# Patient Record
Sex: MALE | Race: WHITE | NOT HISPANIC OR LATINO | Employment: OTHER | URBAN - METROPOLITAN AREA
[De-identification: names, ages, dates, MRNs, and addresses within clinical notes are randomized per-mention and may not be internally consistent; named-entity substitution may affect disease eponyms.]

---

## 2019-05-03 ENCOUNTER — APPOINTMENT (OUTPATIENT)
Dept: GENERAL RADIOLOGY | Facility: HOSPITAL | Age: 74
End: 2019-05-03

## 2019-05-03 ENCOUNTER — HOSPITAL ENCOUNTER (EMERGENCY)
Facility: HOSPITAL | Age: 74
Discharge: HOME | End: 2019-05-03
Attending: EMERGENCY MEDICINE | Admitting: EMERGENCY MEDICINE

## 2019-05-03 VITALS
HEART RATE: 85 BPM | OXYGEN SATURATION: 98 % | DIASTOLIC BLOOD PRESSURE: 80 MMHG | RESPIRATION RATE: 20 BRPM | BODY MASS INDEX: 43.44 KG/M2 | SYSTOLIC BLOOD PRESSURE: 158 MMHG | TEMPERATURE: 98.5 F | HEIGHT: 74 IN

## 2019-05-03 DIAGNOSIS — B97.89 VIRAL RESPIRATORY ILLNESS: Primary | ICD-10-CM

## 2019-05-03 DIAGNOSIS — J98.8 VIRAL RESPIRATORY ILLNESS: Primary | ICD-10-CM

## 2019-05-03 LAB
ALBUMIN SERPL-MCNC: 3.4 G/DL (ref 3.5–5.2)
ALBUMIN/GLOB SERPL: 0.8 G/DL
ALP SERPL-CCNC: 129 U/L (ref 39–117)
ALT SERPL W P-5'-P-CCNC: 29 U/L (ref 1–41)
ANION GAP SERPL CALCULATED.3IONS-SCNC: 10.3 MMOL/L
AST SERPL-CCNC: 35 U/L (ref 1–40)
B PARAPERT DNA SPEC QL NAA+PROBE: NOT DETECTED
B PERT DNA SPEC QL NAA+PROBE: NOT DETECTED
BASOPHILS # BLD AUTO: 0.02 10*3/MM3 (ref 0–0.2)
BASOPHILS NFR BLD AUTO: 0.3 % (ref 0–1.5)
BILIRUB SERPL-MCNC: 0.5 MG/DL (ref 0.2–1.2)
BUN BLD-MCNC: 16 MG/DL (ref 8–23)
BUN/CREAT SERPL: 19 (ref 7–25)
C PNEUM DNA NPH QL NAA+NON-PROBE: NOT DETECTED
CALCIUM SPEC-SCNC: 8.7 MG/DL (ref 8.6–10.5)
CHLORIDE SERPL-SCNC: 97 MMOL/L (ref 98–107)
CO2 SERPL-SCNC: 27.7 MMOL/L (ref 22–29)
CREAT BLD-MCNC: 0.84 MG/DL (ref 0.76–1.27)
D-LACTATE SERPL-SCNC: 1.6 MMOL/L (ref 0.5–2)
DEPRECATED RDW RBC AUTO: 46 FL (ref 37–54)
EOSINOPHIL # BLD AUTO: 0.11 10*3/MM3 (ref 0–0.4)
EOSINOPHIL NFR BLD AUTO: 1.9 % (ref 0.3–6.2)
ERYTHROCYTE [DISTWIDTH] IN BLOOD BY AUTOMATED COUNT: 14.1 % (ref 12.3–15.4)
FLUAV H1 2009 PAND RNA NPH QL NAA+PROBE: NOT DETECTED
FLUAV H1 HA GENE NPH QL NAA+PROBE: NOT DETECTED
FLUAV H3 RNA NPH QL NAA+PROBE: NOT DETECTED
FLUAV SUBTYP SPEC NAA+PROBE: NOT DETECTED
FLUBV RNA ISLT QL NAA+PROBE: NOT DETECTED
GFR SERPL CREATININE-BSD FRML MDRD: 90 ML/MIN/1.73
GLOBULIN UR ELPH-MCNC: 4.3 GM/DL
GLUCOSE BLD-MCNC: 268 MG/DL (ref 65–99)
HADV DNA SPEC NAA+PROBE: NOT DETECTED
HCOV 229E RNA SPEC QL NAA+PROBE: NOT DETECTED
HCOV HKU1 RNA SPEC QL NAA+PROBE: NOT DETECTED
HCOV NL63 RNA SPEC QL NAA+PROBE: NOT DETECTED
HCOV OC43 RNA SPEC QL NAA+PROBE: NOT DETECTED
HCT VFR BLD AUTO: 46.3 % (ref 37.5–51)
HGB BLD-MCNC: 14.9 G/DL (ref 13–17.7)
HMPV RNA NPH QL NAA+NON-PROBE: NOT DETECTED
HPIV1 RNA SPEC QL NAA+PROBE: NOT DETECTED
HPIV2 RNA SPEC QL NAA+PROBE: NOT DETECTED
HPIV3 RNA NPH QL NAA+PROBE: DETECTED
HPIV4 P GENE NPH QL NAA+PROBE: NOT DETECTED
IMM GRANULOCYTES # BLD AUTO: 0.02 10*3/MM3 (ref 0–0.05)
IMM GRANULOCYTES NFR BLD AUTO: 0.3 % (ref 0–0.5)
LYMPHOCYTES # BLD AUTO: 1.21 10*3/MM3 (ref 0.7–3.1)
LYMPHOCYTES NFR BLD AUTO: 21.1 % (ref 19.6–45.3)
M PNEUMO IGG SER IA-ACNC: NOT DETECTED
MCH RBC QN AUTO: 28.7 PG (ref 26.6–33)
MCHC RBC AUTO-ENTMCNC: 32.2 G/DL (ref 31.5–35.7)
MCV RBC AUTO: 89 FL (ref 79–97)
MONOCYTES # BLD AUTO: 0.51 10*3/MM3 (ref 0.1–0.9)
MONOCYTES NFR BLD AUTO: 8.9 % (ref 5–12)
NEUTROPHILS # BLD AUTO: 3.87 10*3/MM3 (ref 1.7–7)
NEUTROPHILS NFR BLD AUTO: 67.5 % (ref 42.7–76)
NRBC BLD AUTO-RTO: 0 /100 WBC (ref 0–0.2)
NT-PROBNP SERPL-MCNC: 43.7 PG/ML (ref 5–900)
PLATELET # BLD AUTO: 131 10*3/MM3 (ref 140–450)
PMV BLD AUTO: 10.8 FL (ref 6–12)
POTASSIUM BLD-SCNC: 4.6 MMOL/L (ref 3.5–5.2)
PROT SERPL-MCNC: 7.7 G/DL (ref 6–8.5)
RBC # BLD AUTO: 5.2 10*6/MM3 (ref 4.14–5.8)
RHINOVIRUS RNA SPEC NAA+PROBE: DETECTED
RSV RNA NPH QL NAA+NON-PROBE: NOT DETECTED
SODIUM BLD-SCNC: 135 MMOL/L (ref 136–145)
TROPONIN T SERPL-MCNC: <0.01 NG/ML (ref 0–0.03)
WBC NRBC COR # BLD: 5.74 10*3/MM3 (ref 3.4–10.8)

## 2019-05-03 PROCEDURE — 99284 EMERGENCY DEPT VISIT MOD MDM: CPT

## 2019-05-03 PROCEDURE — 93005 ELECTROCARDIOGRAM TRACING: CPT | Performed by: NURSE PRACTITIONER

## 2019-05-03 PROCEDURE — 87798 DETECT AGENT NOS DNA AMP: CPT | Performed by: NURSE PRACTITIONER

## 2019-05-03 PROCEDURE — 87581 M.PNEUMON DNA AMP PROBE: CPT | Performed by: NURSE PRACTITIONER

## 2019-05-03 PROCEDURE — 85025 COMPLETE CBC W/AUTO DIFF WBC: CPT | Performed by: NURSE PRACTITIONER

## 2019-05-03 PROCEDURE — 80053 COMPREHEN METABOLIC PANEL: CPT | Performed by: NURSE PRACTITIONER

## 2019-05-03 PROCEDURE — 25010000002 METHYLPREDNISOLONE PER 125 MG: Performed by: NURSE PRACTITIONER

## 2019-05-03 PROCEDURE — 84484 ASSAY OF TROPONIN QUANT: CPT | Performed by: NURSE PRACTITIONER

## 2019-05-03 PROCEDURE — 87486 CHLMYD PNEUM DNA AMP PROBE: CPT | Performed by: NURSE PRACTITIONER

## 2019-05-03 PROCEDURE — 87633 RESP VIRUS 12-25 TARGETS: CPT | Performed by: NURSE PRACTITIONER

## 2019-05-03 PROCEDURE — 93010 ELECTROCARDIOGRAM REPORT: CPT | Performed by: INTERNAL MEDICINE

## 2019-05-03 PROCEDURE — 71046 X-RAY EXAM CHEST 2 VIEWS: CPT

## 2019-05-03 PROCEDURE — 94640 AIRWAY INHALATION TREATMENT: CPT

## 2019-05-03 PROCEDURE — 83605 ASSAY OF LACTIC ACID: CPT | Performed by: NURSE PRACTITIONER

## 2019-05-03 PROCEDURE — 94799 UNLISTED PULMONARY SVC/PX: CPT

## 2019-05-03 PROCEDURE — 87040 BLOOD CULTURE FOR BACTERIA: CPT | Performed by: NURSE PRACTITIONER

## 2019-05-03 PROCEDURE — 83880 ASSAY OF NATRIURETIC PEPTIDE: CPT | Performed by: NURSE PRACTITIONER

## 2019-05-03 PROCEDURE — 96374 THER/PROPH/DIAG INJ IV PUSH: CPT

## 2019-05-03 RX ORDER — METHYLPREDNISOLONE 4 MG/1
TABLET ORAL
Qty: 21 EACH | Refills: 0 | Status: SHIPPED | OUTPATIENT
Start: 2019-05-03

## 2019-05-03 RX ORDER — METHYLPREDNISOLONE SODIUM SUCCINATE 125 MG/2ML
125 INJECTION, POWDER, LYOPHILIZED, FOR SOLUTION INTRAMUSCULAR; INTRAVENOUS ONCE
Status: COMPLETED | OUTPATIENT
Start: 2019-05-03 | End: 2019-05-03

## 2019-05-03 RX ORDER — ALBUTEROL SULFATE 2.5 MG/3ML
2.5 SOLUTION RESPIRATORY (INHALATION)
Status: COMPLETED | OUTPATIENT
Start: 2019-05-03 | End: 2019-05-03

## 2019-05-03 RX ORDER — SODIUM CHLORIDE 0.9 % (FLUSH) 0.9 %
10 SYRINGE (ML) INJECTION AS NEEDED
Status: DISCONTINUED | OUTPATIENT
Start: 2019-05-03 | End: 2019-05-03 | Stop reason: HOSPADM

## 2019-05-03 RX ORDER — ALBUTEROL SULFATE 90 UG/1
2 AEROSOL, METERED RESPIRATORY (INHALATION) EVERY 4 HOURS PRN
Qty: 1 INHALER | Refills: 0 | Status: SHIPPED | OUTPATIENT
Start: 2019-05-03

## 2019-05-03 RX ADMIN — ALBUTEROL SULFATE 2.5 MG: 2.5 SOLUTION RESPIRATORY (INHALATION) at 14:55

## 2019-05-03 RX ADMIN — ALBUTEROL SULFATE 2.5 MG: 2.5 SOLUTION RESPIRATORY (INHALATION) at 14:23

## 2019-05-03 RX ADMIN — ALBUTEROL SULFATE 2.5 MG: 2.5 SOLUTION RESPIRATORY (INHALATION) at 14:18

## 2019-05-03 RX ADMIN — METHYLPREDNISOLONE SODIUM SUCCINATE 125 MG: 125 INJECTION, POWDER, FOR SOLUTION INTRAMUSCULAR; INTRAVENOUS at 14:06

## 2019-05-03 NOTE — DISCHARGE INSTRUCTIONS
Medications as ordered  Tylenol as needed for fever  Rest, increase fluids  Activity as tolerated  Follow up with pmd in 3-5 days if symptoms not improving  Return to er for fever, chills, chest pain, shortness of air, or any new or worsening symptoms
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
## 2019-05-08 LAB
BACTERIA SPEC AEROBE CULT: NORMAL
BACTERIA SPEC AEROBE CULT: NORMAL

## 2021-04-08 NOTE — ED PROVIDER NOTES
Jeffy, Discussed the importance of blood pressure control in the prevention of ocular complications. MD ATTESTATION NOTE    Pt presents with SOA starting one week ago. Pt confirms dizziness, congestion, cough, light-headedness, and CP secondary to cough.     Limited physical exam:  Patient is nontoxic appearing, awake, alert, morbidly obese.  Lungs/cardiovascular: faint scattered rhonchi, no rales.  Abdomen: soft, non-tender.    Pt notes improvement after breathing treatments. I agree with plan of discharge.     The [facility] and I have discussed this patient's history, physical exam, and treatment plan.  I have reviewed the documentation and personally had a face to face interaction with the patient. I affirm the documentation and agree with the treatment and plan.  The attached note describes my personal findings.    Documentation assistance provided by geovanna Tabares for Dr. Stoll.  Information recorded by the scribe was done at my direction and has been verified and validated by me.       Megan Tabares  05/03/19 [id number]       Felix Stoll MD  05/03/19 8612

## 2023-06-09 ENCOUNTER — APPOINTMENT (OUTPATIENT)
Dept: GENERAL RADIOLOGY | Facility: HOSPITAL | Age: 78
End: 2023-06-09
Payer: MEDICARE

## 2023-06-09 ENCOUNTER — HOSPITAL ENCOUNTER (INPATIENT)
Facility: HOSPITAL | Age: 78
LOS: 4 days | Discharge: HOME | End: 2023-06-14
Attending: EMERGENCY MEDICINE | Admitting: INTERNAL MEDICINE
Payer: MEDICARE

## 2023-06-09 DIAGNOSIS — N17.9 AKI (ACUTE KIDNEY INJURY): ICD-10-CM

## 2023-06-09 DIAGNOSIS — R77.8 ELEVATED TROPONIN: ICD-10-CM

## 2023-06-09 DIAGNOSIS — D64.9 ANEMIA, UNSPECIFIED TYPE: ICD-10-CM

## 2023-06-09 DIAGNOSIS — I50.9 ACUTE ON CHRONIC CONGESTIVE HEART FAILURE, UNSPECIFIED HEART FAILURE TYPE: Primary | ICD-10-CM

## 2023-06-09 DIAGNOSIS — R09.02 HYPOXIA: ICD-10-CM

## 2023-06-09 DIAGNOSIS — J44.1 ACUTE EXACERBATION OF CHRONIC OBSTRUCTIVE PULMONARY DISEASE (COPD): ICD-10-CM

## 2023-06-09 LAB
ALBUMIN SERPL-MCNC: 2.9 G/DL (ref 3.5–5.2)
ALBUMIN/GLOB SERPL: 0.7 G/DL
ALP SERPL-CCNC: 74 U/L (ref 39–117)
ALT SERPL W P-5'-P-CCNC: 22 U/L (ref 1–41)
ANION GAP SERPL CALCULATED.3IONS-SCNC: 7.8 MMOL/L (ref 5–15)
AST SERPL-CCNC: 33 U/L (ref 1–40)
B PARAPERT DNA SPEC QL NAA+PROBE: NOT DETECTED
B PERT DNA SPEC QL NAA+PROBE: NOT DETECTED
BASOPHILS # BLD AUTO: 0.01 10*3/MM3 (ref 0–0.2)
BASOPHILS NFR BLD AUTO: 0.3 % (ref 0–1.5)
BILIRUB SERPL-MCNC: 0.5 MG/DL (ref 0–1.2)
BUN SERPL-MCNC: 33 MG/DL (ref 8–23)
BUN/CREAT SERPL: 23.7 (ref 7–25)
C PNEUM DNA NPH QL NAA+NON-PROBE: NOT DETECTED
CALCIUM SPEC-SCNC: 8.4 MG/DL (ref 8.6–10.5)
CHLORIDE SERPL-SCNC: 104 MMOL/L (ref 98–107)
CO2 SERPL-SCNC: 26.2 MMOL/L (ref 22–29)
CREAT SERPL-MCNC: 1.39 MG/DL (ref 0.76–1.27)
D DIMER PPP FEU-MCNC: 1.94 MCGFEU/ML (ref 0–0.77)
DEPRECATED RDW RBC AUTO: 45 FL (ref 37–54)
EGFRCR SERPLBLD CKD-EPI 2021: 52.2 ML/MIN/1.73
EOSINOPHIL # BLD AUTO: 0.09 10*3/MM3 (ref 0–0.4)
EOSINOPHIL NFR BLD AUTO: 2.6 % (ref 0.3–6.2)
ERYTHROCYTE [DISTWIDTH] IN BLOOD BY AUTOMATED COUNT: 13.6 % (ref 12.3–15.4)
FLUAV SUBTYP SPEC NAA+PROBE: NOT DETECTED
FLUBV RNA ISLT QL NAA+PROBE: NOT DETECTED
GLOBULIN UR ELPH-MCNC: 4.2 GM/DL
GLUCOSE BLDC GLUCOMTR-MCNC: 68 MG/DL (ref 70–130)
GLUCOSE SERPL-MCNC: 96 MG/DL (ref 65–99)
HADV DNA SPEC NAA+PROBE: NOT DETECTED
HCOV 229E RNA SPEC QL NAA+PROBE: NOT DETECTED
HCOV HKU1 RNA SPEC QL NAA+PROBE: NOT DETECTED
HCOV NL63 RNA SPEC QL NAA+PROBE: NOT DETECTED
HCOV OC43 RNA SPEC QL NAA+PROBE: NOT DETECTED
HCT VFR BLD AUTO: 36.1 % (ref 37.5–51)
HGB BLD-MCNC: 12 G/DL (ref 13–17.7)
HMPV RNA NPH QL NAA+NON-PROBE: NOT DETECTED
HOLD SPECIMEN: NORMAL
HOLD SPECIMEN: NORMAL
HPIV1 RNA ISLT QL NAA+PROBE: NOT DETECTED
HPIV2 RNA SPEC QL NAA+PROBE: NOT DETECTED
HPIV3 RNA NPH QL NAA+PROBE: NOT DETECTED
HPIV4 P GENE NPH QL NAA+PROBE: NOT DETECTED
IMM GRANULOCYTES # BLD AUTO: 0 10*3/MM3 (ref 0–0.05)
IMM GRANULOCYTES NFR BLD AUTO: 0 % (ref 0–0.5)
LYMPHOCYTES # BLD AUTO: 0.74 10*3/MM3 (ref 0.7–3.1)
LYMPHOCYTES NFR BLD AUTO: 21.4 % (ref 19.6–45.3)
M PNEUMO IGG SER IA-ACNC: NOT DETECTED
MCH RBC QN AUTO: 30 PG (ref 26.6–33)
MCHC RBC AUTO-ENTMCNC: 33.2 G/DL (ref 31.5–35.7)
MCV RBC AUTO: 90.3 FL (ref 79–97)
MONOCYTES # BLD AUTO: 0.32 10*3/MM3 (ref 0.1–0.9)
MONOCYTES NFR BLD AUTO: 9.2 % (ref 5–12)
NEUTROPHILS NFR BLD AUTO: 2.3 10*3/MM3 (ref 1.7–7)
NEUTROPHILS NFR BLD AUTO: 66.5 % (ref 42.7–76)
NRBC BLD AUTO-RTO: 0 /100 WBC (ref 0–0.2)
NT-PROBNP SERPL-MCNC: 643 PG/ML (ref 0–1800)
PLATELET # BLD AUTO: 85 10*3/MM3 (ref 140–450)
PMV BLD AUTO: 11.3 FL (ref 6–12)
POTASSIUM SERPL-SCNC: 4.9 MMOL/L (ref 3.5–5.2)
PROT SERPL-MCNC: 7.1 G/DL (ref 6–8.5)
QT INTERVAL: 482 MS
RBC # BLD AUTO: 4 10*6/MM3 (ref 4.14–5.8)
RHINOVIRUS RNA SPEC NAA+PROBE: NOT DETECTED
RSV RNA NPH QL NAA+NON-PROBE: NOT DETECTED
SARS-COV-2 RNA NPH QL NAA+NON-PROBE: NOT DETECTED
SODIUM SERPL-SCNC: 138 MMOL/L (ref 136–145)
TROPONIN T SERPL HS-MCNC: 21 NG/L
TROPONIN T SERPL HS-MCNC: 21 NG/L
WBC NRBC COR # BLD: 3.46 10*3/MM3 (ref 3.4–10.8)
WHOLE BLOOD HOLD COAG: NORMAL
WHOLE BLOOD HOLD SPECIMEN: NORMAL

## 2023-06-09 PROCEDURE — 63710000001 PREDNISONE PER 1 MG: Performed by: EMERGENCY MEDICINE

## 2023-06-09 PROCEDURE — 94640 AIRWAY INHALATION TREATMENT: CPT

## 2023-06-09 PROCEDURE — 93010 ELECTROCARDIOGRAM REPORT: CPT | Performed by: STUDENT IN AN ORGANIZED HEALTH CARE EDUCATION/TRAINING PROGRAM

## 2023-06-09 PROCEDURE — 71046 X-RAY EXAM CHEST 2 VIEWS: CPT

## 2023-06-09 PROCEDURE — 84484 ASSAY OF TROPONIN QUANT: CPT | Performed by: EMERGENCY MEDICINE

## 2023-06-09 PROCEDURE — G0378 HOSPITAL OBSERVATION PER HR: HCPCS

## 2023-06-09 PROCEDURE — 93005 ELECTROCARDIOGRAM TRACING: CPT | Performed by: EMERGENCY MEDICINE

## 2023-06-09 PROCEDURE — 25010000002 MAGNESIUM SULFATE 2 GM/50ML SOLUTION: Performed by: EMERGENCY MEDICINE

## 2023-06-09 PROCEDURE — 80053 COMPREHEN METABOLIC PANEL: CPT | Performed by: EMERGENCY MEDICINE

## 2023-06-09 PROCEDURE — 25010000002 FUROSEMIDE PER 20 MG: Performed by: EMERGENCY MEDICINE

## 2023-06-09 PROCEDURE — 94799 UNLISTED PULMONARY SVC/PX: CPT

## 2023-06-09 PROCEDURE — 82948 REAGENT STRIP/BLOOD GLUCOSE: CPT

## 2023-06-09 PROCEDURE — 94761 N-INVAS EAR/PLS OXIMETRY MLT: CPT

## 2023-06-09 PROCEDURE — 85379 FIBRIN DEGRADATION QUANT: CPT | Performed by: EMERGENCY MEDICINE

## 2023-06-09 PROCEDURE — 84484 ASSAY OF TROPONIN QUANT: CPT | Performed by: INTERNAL MEDICINE

## 2023-06-09 PROCEDURE — 94664 DEMO&/EVAL PT USE INHALER: CPT

## 2023-06-09 PROCEDURE — 25010000002 FUROSEMIDE PER 20 MG: Performed by: INTERNAL MEDICINE

## 2023-06-09 PROCEDURE — 25010000002 METHYLPREDNISOLONE PER 40 MG: Performed by: INTERNAL MEDICINE

## 2023-06-09 PROCEDURE — 0202U NFCT DS 22 TRGT SARS-COV-2: CPT | Performed by: EMERGENCY MEDICINE

## 2023-06-09 PROCEDURE — 83880 ASSAY OF NATRIURETIC PEPTIDE: CPT | Performed by: EMERGENCY MEDICINE

## 2023-06-09 PROCEDURE — 85025 COMPLETE CBC W/AUTO DIFF WBC: CPT | Performed by: EMERGENCY MEDICINE

## 2023-06-09 PROCEDURE — 99285 EMERGENCY DEPT VISIT HI MDM: CPT

## 2023-06-09 RX ORDER — LEVOTHYROXINE SODIUM 0.03 MG/1
25 TABLET ORAL
Status: DISCONTINUED | OUTPATIENT
Start: 2023-06-10 | End: 2023-06-14 | Stop reason: HOSPADM

## 2023-06-09 RX ORDER — ACETAMINOPHEN 325 MG/1
650 TABLET ORAL EVERY 4 HOURS PRN
Status: DISCONTINUED | OUTPATIENT
Start: 2023-06-09 | End: 2023-06-14 | Stop reason: HOSPADM

## 2023-06-09 RX ORDER — FUROSEMIDE 10 MG/ML
80 INJECTION INTRAMUSCULAR; INTRAVENOUS ONCE
Status: COMPLETED | OUTPATIENT
Start: 2023-06-09 | End: 2023-06-09

## 2023-06-09 RX ORDER — ALBUTEROL SULFATE 2.5 MG/3ML
2.5 SOLUTION RESPIRATORY (INHALATION) ONCE
Status: COMPLETED | OUTPATIENT
Start: 2023-06-09 | End: 2023-06-09

## 2023-06-09 RX ORDER — AMOXICILLIN 250 MG
2 CAPSULE ORAL 2 TIMES DAILY
Status: DISCONTINUED | OUTPATIENT
Start: 2023-06-09 | End: 2023-06-14 | Stop reason: HOSPADM

## 2023-06-09 RX ORDER — GABAPENTIN 300 MG/1
300 CAPSULE ORAL 3 TIMES DAILY
Status: DISCONTINUED | OUTPATIENT
Start: 2023-06-09 | End: 2023-06-11

## 2023-06-09 RX ORDER — GABAPENTIN 300 MG/1
300 CAPSULE ORAL 3 TIMES DAILY
COMMUNITY
Start: 2023-03-13 | End: 2023-06-14 | Stop reason: HOSPADM

## 2023-06-09 RX ORDER — AMLODIPINE BESYLATE 10 MG/1
10 TABLET ORAL
Status: DISCONTINUED | OUTPATIENT
Start: 2023-06-10 | End: 2023-06-14 | Stop reason: HOSPADM

## 2023-06-09 RX ORDER — ATORVASTATIN CALCIUM 20 MG/1
40 TABLET, FILM COATED ORAL NIGHTLY
Status: DISCONTINUED | OUTPATIENT
Start: 2023-06-09 | End: 2023-06-14 | Stop reason: HOSPADM

## 2023-06-09 RX ORDER — HYDROCODONE BITARTRATE AND ACETAMINOPHEN 10; 325 MG/1; MG/1
1 TABLET ORAL EVERY 6 HOURS PRN
Status: DISCONTINUED | OUTPATIENT
Start: 2023-06-09 | End: 2023-06-14 | Stop reason: HOSPADM

## 2023-06-09 RX ORDER — LEVOTHYROXINE SODIUM 0.03 MG/1
1 TABLET ORAL DAILY
COMMUNITY
Start: 2023-04-11

## 2023-06-09 RX ORDER — POLYETHYLENE GLYCOL 3350 17 G/17G
17 POWDER, FOR SOLUTION ORAL DAILY PRN
Status: DISCONTINUED | OUTPATIENT
Start: 2023-06-09 | End: 2023-06-14 | Stop reason: HOSPADM

## 2023-06-09 RX ORDER — IPRATROPIUM BROMIDE AND ALBUTEROL SULFATE 2.5; .5 MG/3ML; MG/3ML
3 SOLUTION RESPIRATORY (INHALATION) ONCE
Status: COMPLETED | OUTPATIENT
Start: 2023-06-09 | End: 2023-06-09

## 2023-06-09 RX ORDER — BISACODYL 5 MG/1
5 TABLET, DELAYED RELEASE ORAL DAILY PRN
Status: DISCONTINUED | OUTPATIENT
Start: 2023-06-09 | End: 2023-06-14 | Stop reason: HOSPADM

## 2023-06-09 RX ORDER — PREDNISONE 20 MG/1
60 TABLET ORAL ONCE
Status: COMPLETED | OUTPATIENT
Start: 2023-06-09 | End: 2023-06-09

## 2023-06-09 RX ORDER — ONDANSETRON 2 MG/ML
4 INJECTION INTRAMUSCULAR; INTRAVENOUS EVERY 6 HOURS PRN
Status: DISCONTINUED | OUTPATIENT
Start: 2023-06-09 | End: 2023-06-14 | Stop reason: HOSPADM

## 2023-06-09 RX ORDER — ALBUTEROL SULFATE 2.5 MG/3ML
2.5 SOLUTION RESPIRATORY (INHALATION) EVERY 6 HOURS PRN
Status: DISCONTINUED | OUTPATIENT
Start: 2023-06-09 | End: 2023-06-14 | Stop reason: HOSPADM

## 2023-06-09 RX ORDER — MAGNESIUM SULFATE HEPTAHYDRATE 40 MG/ML
2 INJECTION, SOLUTION INTRAVENOUS ONCE
Status: COMPLETED | OUTPATIENT
Start: 2023-06-09 | End: 2023-06-09

## 2023-06-09 RX ORDER — FUROSEMIDE 10 MG/ML
40 INJECTION INTRAMUSCULAR; INTRAVENOUS EVERY 12 HOURS
Status: DISCONTINUED | OUTPATIENT
Start: 2023-06-09 | End: 2023-06-11

## 2023-06-09 RX ORDER — BISACODYL 10 MG
10 SUPPOSITORY, RECTAL RECTAL DAILY PRN
Status: DISCONTINUED | OUTPATIENT
Start: 2023-06-09 | End: 2023-06-14 | Stop reason: HOSPADM

## 2023-06-09 RX ORDER — METHYLPREDNISOLONE SODIUM SUCCINATE 40 MG/ML
40 INJECTION, POWDER, LYOPHILIZED, FOR SOLUTION INTRAMUSCULAR; INTRAVENOUS EVERY 8 HOURS
Status: DISCONTINUED | OUTPATIENT
Start: 2023-06-09 | End: 2023-06-13

## 2023-06-09 RX ORDER — ONDANSETRON 4 MG/1
4 TABLET, FILM COATED ORAL EVERY 6 HOURS PRN
Status: DISCONTINUED | OUTPATIENT
Start: 2023-06-09 | End: 2023-06-14 | Stop reason: HOSPADM

## 2023-06-09 RX ORDER — UREA 10 %
3 LOTION (ML) TOPICAL NIGHTLY PRN
Status: DISCONTINUED | OUTPATIENT
Start: 2023-06-09 | End: 2023-06-14 | Stop reason: HOSPADM

## 2023-06-09 RX ORDER — SODIUM CHLORIDE 0.9 % (FLUSH) 0.9 %
10 SYRINGE (ML) INJECTION AS NEEDED
Status: DISCONTINUED | OUTPATIENT
Start: 2023-06-09 | End: 2023-06-14 | Stop reason: HOSPADM

## 2023-06-09 RX ORDER — BUPROPION HYDROCHLORIDE 150 MG/1
150 TABLET, EXTENDED RELEASE ORAL DAILY
Status: DISCONTINUED | OUTPATIENT
Start: 2023-06-10 | End: 2023-06-14 | Stop reason: HOSPADM

## 2023-06-09 RX ORDER — IPRATROPIUM BROMIDE AND ALBUTEROL SULFATE 2.5; .5 MG/3ML; MG/3ML
3 SOLUTION RESPIRATORY (INHALATION)
Status: DISCONTINUED | OUTPATIENT
Start: 2023-06-09 | End: 2023-06-14 | Stop reason: HOSPADM

## 2023-06-09 RX ORDER — HYDROCODONE BITARTRATE AND ACETAMINOPHEN 10; 325 MG/1; MG/1
1 TABLET ORAL EVERY 6 HOURS PRN
COMMUNITY
Start: 2023-05-18

## 2023-06-09 RX ADMIN — METHYLPREDNISOLONE SODIUM SUCCINATE 40 MG: 40 INJECTION, POWDER, FOR SOLUTION INTRAMUSCULAR; INTRAVENOUS at 21:33

## 2023-06-09 RX ADMIN — ALBUTEROL SULFATE 2.5 MG: 2.5 SOLUTION RESPIRATORY (INHALATION) at 15:40

## 2023-06-09 RX ADMIN — PREDNISONE 60 MG: 20 TABLET ORAL at 15:22

## 2023-06-09 RX ADMIN — IPRATROPIUM BROMIDE AND ALBUTEROL SULFATE 3 ML: .5; 3 SOLUTION RESPIRATORY (INHALATION) at 15:42

## 2023-06-09 RX ADMIN — FUROSEMIDE 40 MG: 10 INJECTION, SOLUTION INTRAMUSCULAR; INTRAVENOUS at 21:33

## 2023-06-09 RX ADMIN — MAGNESIUM SULFATE HEPTAHYDRATE 2 G: 40 INJECTION, SOLUTION INTRAVENOUS at 15:22

## 2023-06-09 RX ADMIN — FUROSEMIDE 80 MG: 10 INJECTION, SOLUTION INTRAMUSCULAR; INTRAVENOUS at 15:23

## 2023-06-09 NOTE — ED NOTES
..Nursing report ED to floor  Brandon Alan  78 y.o.  male    HPI :   Chief Complaint   Patient presents with    Shortness of Breath       Admitting doctor:   Anna Nolasco MD    Admitting diagnosis:   The primary encounter diagnosis was Acute on chronic congestive heart failure, unspecified heart failure type. Diagnoses of Hypoxia, ELBERT (acute kidney injury), Acute exacerbation of chronic obstructive pulmonary disease (COPD), Elevated troponin, and Anemia, unspecified type were also pertinent to this visit.    Code status:   Current Code Status       Date Active Code Status Order ID Comments User Context       Prior            Allergies:   Penicillins    Isolation:   No active isolations    Intake and Output  No intake or output data in the 24 hours ending 06/09/23 1628    Weight:   There were no vitals filed for this visit.    Most recent vitals:   Vitals:    06/09/23 1331 06/09/23 1334 06/09/23 1348 06/09/23 1540   BP:   100/62    Pulse: 58      Resp: 16   22   Temp: 97 °F (36.1 °C)      SpO2:  92%         Active LDAs/IV Access:   Lines, Drains & Airways       Active LDAs       Name Placement date Placement time Site Days    Peripheral IV 06/09/23 1432 Right Antecubital 06/09/23  1432  Antecubital  less than 1                    Labs (abnormal labs have a star):   Labs Reviewed   COMPREHENSIVE METABOLIC PANEL - Abnormal; Notable for the following components:       Result Value    BUN 33 (*)     Creatinine 1.39 (*)     Calcium 8.4 (*)     Albumin 2.9 (*)     eGFR 52.2 (*)     All other components within normal limits    Narrative:     GFR Normal >60  Chronic Kidney Disease <60  Kidney Failure <15    The GFR formula is only valid for adults with stable renal function between ages 18 and 70.   SINGLE HSTROPONIN T - Abnormal; Notable for the following components:    HS Troponin T 21 (*)     All other components within normal limits    Narrative:     High Sensitive Troponin T Reference Range:  <10.0 ng/L-  "Negative Female for AMI  <15.0 ng/L- Negative Male for AMI  >=10 - Abnormal Female indicating possible myocardial injury.  >=15 - Abnormal Male indicating possible myocardial injury.   Clinicians would have to utilize clinical acumen, EKG, Troponin, and serial changes to determine if it is an Acute Myocardial Infarction or myocardial injury due to an underlying chronic condition.        CBC WITH AUTO DIFFERENTIAL - Abnormal; Notable for the following components:    RBC 4.00 (*)     Hemoglobin 12.0 (*)     Hematocrit 36.1 (*)     Platelets 85 (*)     All other components within normal limits   D-DIMER, QUANTITATIVE - Abnormal; Notable for the following components:    D-Dimer, Quantitative 1.94 (*)     All other components within normal limits    Narrative:     According to the assay 's published package insert, a normal (<0.50 MCGFEU/mL) D-dimer result in conjunction with a non-high clinical probability assessment, excludes deep vein thrombosis (DVT) and pulmonary embolism (PE) with high sensitivity.    D-dimer values increase with age and this can make VTE exclusion of an older population difficult. To address this, the [employer], based on best available evidence and recent guidelines, recommends that clinicians use age-adjusted D-dimer thresholds in patients greater than 50 years of age with: a) a low probability of PE who do not meet all Pulmonary Embolism Rule Out Criteria, or b) in those with intermediate probability of PE.   The formula for an age-adjusted D-dimer cut-off is \"age/100\".  For example, a 60 year old patient would have an age-adjusted cut-off of 0.60 MCGFEU/mL and an 80 year old 0.80 MCGFEU/mL.   BNP (IN-HOUSE) - Normal    Narrative:     Among patients with dyspnea, NT-proBNP is highly sensitive for the detection of acute congestive heart failure. In addition NT-proBNP of <300 pg/ml effectively rules out acute congestive heart failure with 99% negative predictive " value.    Results may be falsely decreased if patient taking Biotin.     RESPIRATORY PANEL PCR W/ COVID-19 (SARS-COV-2) SON/MARY JO/ANGELA/PAD/COR/MAD/JOAQUIN IN-HOUSE, NP SWAB IN [facility], 3-4 HR TAT   RAINBOW DRAW    Narrative:     The following orders were created for panel order [facility] Draw.  Procedure                               Abnormality         Status                     ---------                               -----------         ------                     Green Top (Gel)[[id number]]                                  Final result               Lavender Top[[id number]]                                     Final result               Gold Top - SST[[id number]]                                   Final result               Light Blue Top[[id number]]                                   Final result                 Please view results for these tests on the individual orders.   CBC AND DIFFERENTIAL    Narrative:     The following orders were created for panel order CBC & Differential.  Procedure                               Abnormality         Status                     ---------                               -----------         ------                     CBC Auto Differential[[id number]]        Abnormal            Final result                 Please view results for these tests on the individual orders.   GREEN TOP   LAVENDER TOP   GOLD TOP - SST   LIGHT BLUE TOP       EKG:   ECG 12 Lead ED Triage Standing Order; SOA   Final Result   HEART RATE= 54  bpm   RR Interval= 1111  ms   DC Interval=   ms   P Horizontal Axis=   deg   P Front Axis=   deg   QRSD Interval= 129  ms   QT Interval= 482  ms   QRS Axis= -38  deg   T Wave Axis= 34  deg   - ABNORMAL ECG -   Likely sinus bradycardia   Left axis deviation   Nonspecific intraventricular conduction delay   When compared with ECG of 03-May-2019 14:16:55,   Significant artifact, please repeat   Electronically Signed By: Serafin Swartz ([facility]) 09-Jun-2023 14:55:27   Date and Time of Study:  2023-06-09 14:28:35          Meds given in ED:   Medications   sodium chloride 0.9 % flush 10 mL (has no administration in time range)   ipratropium-albuterol (DUO-NEB) nebulizer solution 3 mL (3 mL Nebulization Given 6/9/23 1542)   albuterol (PROVENTIL) nebulizer solution 0.083% 2.5 mg/3mL (2.5 mg Nebulization Given 6/9/23 1540)   magnesium sulfate 2g/50 mL (PREMIX) infusion (2 g Intravenous New Bag 6/9/23 1522)   predniSONE (DELTASONE) tablet 60 mg (60 mg Oral Given 6/9/23 1522)   furosemide (LASIX) injection 80 mg (80 mg Intravenous Given 6/9/23 1523)       Imaging results:  XR Chest 2 View    Result Date: 6/9/2023  No focal pulmonary consolidation. Cardiomegaly with pulmonary vascular congestion with minimal pleural effusions.  This report was finalized on 6/9/2023 4:05 PM by Dr. Sudeep Boothe M.D.       Ambulatory status:   - x1 assist    Social issues:   Social History     Socioeconomic History    Marital status: Single   Tobacco Use    Smoking status: Never   Substance and Sexual Activity    Alcohol use: No    Drug use: No             Jen Griffin RN  06/09/23 16:28 EDT

## 2023-06-09 NOTE — ED PROVIDER NOTES
EMERGENCY DEPARTMENT ENCOUNTER  I wore full protective equipment throughout this patient encounter including a N95 mask, eye shield, gown and gloves. Hand hygiene was performed before donning protective equipment and after removal when leaving the room.    Room Number:  39/39  Date of encounter:  6/9/2023  PCP: Wong Cuellar MD  Patient Care Team:  Wong Cuellar MD as PCP - General (Family Medicine)     HPI:  Context: Brandon Alan is a 78 y.o. male who presents to the ED c/o chief complaint of shortness of breath.  Patient reports history of COPD, on 2 L oxygen, reports he is on diuretic, Lasix 40 mg twice daily.  Patient reports that he is chronically short of breath, has been feeling more short of breath since he last saw his pulmonologist in April.  Patient denies dyspnea at rest, reports dyspnea with exertion which is typical for him but lately he has been having orthopnea, paroxysmal nocturnal dyspnea.  Patient does report some swelling in his lower extremities, has been compliant with his diuretic.  Patient does not take his weight daily, unaware of any unexplained weight gain.  Patient denies any chest pain, does endorse cough, cough is nonproductive in nature.  No vomiting or diarrhea, no loss of sense of smell or taste, no fever shakes chills or night sweats.  Patient has been vaccinated against COVID-19.    MEDICAL HISTORY REVIEW  Reviewed in [facility]    PAST MEDICAL HISTORY  Active Ambulatory Problems     Diagnosis Date Noted    Weakness 03/27/2016    Bronchitis 03/29/2016    DM type 2 (diabetes mellitus, type 2) 03/29/2016    Hypertension 03/29/2016    Hyponatremia 03/29/2016    Obesity 03/29/2016     Resolved Ambulatory Problems     Diagnosis Date Noted    No Resolved Ambulatory Problems     Past Medical History:   Diagnosis Date    Depression     Diabetes mellitus        PAST SURGICAL HISTORY  No past surgical history on file.    FAMILY HISTORY  No family history on file.    SOCIAL  HISTORY  Social History     Socioeconomic History    Marital status: Single   Tobacco Use    Smoking status: Never   Substance and Sexual Activity    Alcohol use: No    Drug use: No       ALLERGIES  Penicillins    The patient's allergies have been reviewed    REVIEW OF SYSTEMS  All systems reviewed and negative except for those discussed in HPI.     PHYSICAL EXAM  I have reviewed the triage vital signs and nursing notes.  ED Triage Vitals   Temp Heart Rate Resp BP SpO2   06/09/23 1331 06/09/23 1331 06/09/23 1331 06/09/23 1348 06/09/23 1334   97 °F (36.1 °C) 58 16 100/62 92 %      Temp src Heart Rate Source Patient Position BP Location FiO2 (%)   -- -- -- -- --              General: No acute distress.  HENT: NCAT, PERRL, Nares patent.  Eyes: no scleral icterus.  Neck: trachea midline, no ROM limitations.  To assess for JVD secondary to habitus  CV: regular rhythm, regular rate.  Respiratory: normal effort, diminished with expiratory wheezing.  Abdomen: soft, nondistended, NTTP, no rebound tenderness, no guarding or rigidity.  Musculoskeletal: no deformity.  Neuro: alert, moves all extremities, follows commands.  Skin: warm, dry.  1+ pitting edema bilateral lower extremities    LAB RESULTS  Recent Results (from the past 24 hour(s))   D-dimer, Quantitative    Collection Time: 06/09/23  2:04 PM    Specimen: Blood   Result Value Ref Range    D-Dimer, Quantitative 1.94 (H) 0.00 - 0.77 MCGFEU/mL   ECG 12 Lead ED Triage Standing Order; SOA    Collection Time: 06/09/23  2:28 PM   Result Value Ref Range    QT Interval 482 ms   Comprehensive Metabolic Panel    Collection Time: 06/09/23  2:31 PM    Specimen: Blood   Result Value Ref Range    Glucose 96 65 - 99 mg/dL    BUN 33 (H) 8 - 23 mg/dL    Creatinine 1.39 (H) 0.76 - 1.27 mg/dL    Sodium 138 136 - 145 mmol/L    Potassium 4.9 3.5 - 5.2 mmol/L    Chloride 104 98 - 107 mmol/L    CO2 26.2 22.0 - 29.0 mmol/L    Calcium 8.4 (L) 8.6 - 10.5 mg/dL    Total Protein 7.1 6.0 - 8.5  g/dL    Albumin 2.9 (L) 3.5 - 5.2 g/dL    ALT (SGPT) 22 1 - 41 U/L    AST (SGOT) 33 1 - 40 U/L    Alkaline Phosphatase 74 39 - 117 U/L    Total Bilirubin 0.5 0.0 - 1.2 mg/dL    Globulin 4.2 gm/dL    A/G Ratio 0.7 g/dL    BUN/Creatinine Ratio 23.7 7.0 - 25.0    Anion Gap 7.8 5.0 - 15.0 mmol/L    eGFR 52.2 (L) >60.0 mL/min/1.73   BNP    Collection Time: 06/09/23  2:31 PM    Specimen: Blood   Result Value Ref Range    proBNP 643.0 0.0 - 1,800.0 pg/mL   Single High Sensitivity Troponin T    Collection Time: 06/09/23  2:31 PM    Specimen: Blood   Result Value Ref Range    HS Troponin T 21 (H) <15 ng/L   Green Top (Gel)    Collection Time: 06/09/23  2:31 PM   Result Value Ref Range    Extra Tube Hold for add-ons.    Lavender Top    Collection Time: 06/09/23  2:31 PM   Result Value Ref Range    Extra Tube hold for add-on    Gold Top - SST    Collection Time: 06/09/23  2:31 PM   Result Value Ref Range    Extra Tube Hold for add-ons.    Light Blue Top    Collection Time: 06/09/23  2:31 PM   Result Value Ref Range    Extra Tube Hold for add-ons.    CBC Auto Differential    Collection Time: 06/09/23  2:31 PM    Specimen: Blood   Result Value Ref Range    WBC 3.46 3.40 - 10.80 10*3/mm3    RBC 4.00 (L) 4.14 - 5.80 10*6/mm3    Hemoglobin 12.0 (L) 13.0 - 17.7 g/dL    Hematocrit 36.1 (L) 37.5 - 51.0 %    MCV 90.3 79.0 - 97.0 fL    MCH 30.0 26.6 - 33.0 pg    MCHC 33.2 31.5 - 35.7 g/dL    RDW 13.6 12.3 - 15.4 %    RDW-SD 45.0 37.0 - 54.0 fl    MPV 11.3 6.0 - 12.0 fL    Platelets 85 (L) 140 - 450 10*3/mm3    Neutrophil % 66.5 42.7 - 76.0 %    Lymphocyte % 21.4 19.6 - 45.3 %    Monocyte % 9.2 5.0 - 12.0 %    Eosinophil % 2.6 0.3 - 6.2 %    Basophil % 0.3 0.0 - 1.5 %    Immature Grans % 0.0 0.0 - 0.5 %    Neutrophils, Absolute 2.30 1.70 - 7.00 10*3/mm3    Lymphocytes, Absolute 0.74 0.70 - 3.10 10*3/mm3    Monocytes, Absolute 0.32 0.10 - 0.90 10*3/mm3    Eosinophils, Absolute 0.09 0.00 - 0.40 10*3/mm3    Basophils, Absolute 0.01 0.00 -  0.20 10*3/mm3    Immature Grans, Absolute 0.00 0.00 - 0.05 10*3/mm3    nRBC 0.0 0.0 - 0.2 /100 WBC       I ordered the above labs and reviewed the results.    RADIOLOGY  XR Chest 2 View    Result Date: 6/9/2023  XR CHEST 2 VW-  HISTORY: Male who is 78 years-old,  short of breath  TECHNIQUE: Frontal and lateral views of the chest  COMPARISON: 05/02/2019  FINDINGS:  The heart is enlarged. Pulmonary vasculature is congested. No focal pulmonary consolidation. Minimal pleural effusions are suggested. No pneumothorax. Otherwise stable.      No focal pulmonary consolidation. Cardiomegaly with pulmonary vascular congestion with minimal pleural effusions.  This report was finalized on 6/9/2023 4:05 PM by Dr. Sudeep Boothe M.D.       I ordered the above noted radiological studies. I reviewed the images and results. I agree with the radiologist interpretation.    PROCEDURES  Procedures    MEDICATIONS GIVEN IN ER  Medications   sodium chloride 0.9 % flush 10 mL (has no administration in time range)   ipratropium-albuterol (DUO-NEB) nebulizer solution 3 mL (3 mL Nebulization Given 6/9/23 1542)   albuterol (PROVENTIL) nebulizer solution 0.083% 2.5 mg/3mL (2.5 mg Nebulization Given 6/9/23 1540)   magnesium sulfate 2g/50 mL (PREMIX) infusion (2 g Intravenous New Bag 6/9/23 1522)   predniSONE (DELTASONE) tablet 60 mg (60 mg Oral Given 6/9/23 1522)   furosemide (LASIX) injection 80 mg (80 mg Intravenous Given 6/9/23 1523)       PROGRESS, DATA ANALYSIS, CONSULTS, AND MEDICAL DECISION MAKING  A complete history and physical exam have been performed.  All available laboratory and imaging results have been reviewed by myself prior to disposition.    MDM    After the initial H&P, I discussed pertinent information from history and physical exam with patient/family.  Discussed differential diagnosis.  Discussed plan for ED evaluation/workup/treatment.  All questions answered.  Patient/family is agreeable with plan.  ED Course as of  06/09/23 1623   Fri Jun 09, 2023   1400 My differential diagnosis for dyspnea includes but is not limited to:  Asthma, COPD, pneumonia, pulmonary embolus, acute respiratory distress syndrome, pneumothorax, pleural effusion, pulmonary fibrosis, congestive heart failure, myocardial infarction, DKA, uremia, acidosis, sepsis, anemia, drug related, hyperventilation, CNS disease     [JG]   1402 Medical history reviewed and significant for: Patient followed by [facility] pulmonology, last seen in their office for COPD on 14 April of this year.  Per notes, patient mainly compliant with oxygen although at that time did walk into the clinic without wearing oxygen, was satting 89%.  Patient last had CT imaging of his chest performed and May of this year, patient had small bilateral pleural effusions with associated compressive atelectasis, cardiomegaly with dilation of pulmonary vasculature, mediastinal lymphadenopathy, right pulmonary nodule, hepatic cirrhosis. [JG]   1440 EKG independently viewed and contemporaneously interpreted by ED physician. Time: 1428.  Rate 54.  Interpretation: Normal sinus rhythm, borderline left axis deviation, nonspecific intraventricular conduction delay, no acute ST changes. [JG]   1441 When compared with prior EKG on 5/3/2019, left axis deviation is unchanged but nonspecific intraventricular conduction delay is new when compared with prior. [JG]   1500 Patient currently requiring 3 L to maintain oxygen saturation of 92%.  Patient's baseline oxygen is 2 L. [JG]   1602 I viewed chest x-ray in PACS, patient has cardiomegaly with pulmonary edema per my read. [JG]   1606 Patient with CHF exacerbation, hypoxic, mild ELBERT.  Patient also diminished, history of COPD, treating for COPD exacerbation as well.  Patient will require admission for diuresis and breathing treatments.  Consulting hospitalist for admission. [JG]   1611 78-year-old male with history of COPD on 2 L oxygen all time, CHF presents with  shortness of breath.  Patient found to have acute CHF exacerbation, hypoxic, treated with Lasix.  Patient also diminished, wheezing, treating with steroids, breathing treatments, magnesium.  Patient noted to have mild ELBERT.  Troponin minimally elevated 21, EKG showed no acute findings.  Patient currently pending hospitalist callback for admission. [JG]   1622 Phone call with Dr. Nolasco [facility].  Discussed the patient, relevant history, exam, diagnostics, ED findings/progress, and concerns. They agree to admit the patient to telemetry observation. Care assumed by the admitting physician at this time. [JG]      ED Course User Index  [JG] Medardo Puente MD       AS OF 16:23 EDT VITALS:    BP - 100/62  HR - 58  TEMP - 97 °F (36.1 °C)  O2 SATS - 92%    DIAGNOSIS  Final diagnoses:   Acute on chronic congestive heart failure, unspecified heart failure type   Hypoxia   ELBERT (acute kidney injury)   Acute exacerbation of chronic obstructive pulmonary disease (COPD)   Elevated troponin   Anemia, unspecified type         DISPOSITION  ADMISSION    Discussed treatment plan and reason for admission with pt/family and admitting physician.  Pt/family voiced understanding of the plan for admission for further testing/treatment as needed.        Medardo Puente MD  06/09/23 [id number]

## 2023-06-09 NOTE — ED TRIAGE NOTES
Patient reports being more short of breath, he states that he has felt bad for around 2 months- they have put him on oxygen and gave him inhalers but they are not working.

## 2023-06-10 PROBLEM — N18.30 CKD (CHRONIC KIDNEY DISEASE) STAGE 3, GFR 30-59 ML/MIN: Status: ACTIVE | Noted: 2023-06-10

## 2023-06-10 PROBLEM — E87.5 HYPERKALEMIA: Status: ACTIVE | Noted: 2023-06-10

## 2023-06-10 PROBLEM — G47.33 OSA AND COPD OVERLAP SYNDROME: Status: ACTIVE | Noted: 2023-06-10

## 2023-06-10 PROBLEM — J44.9 OSA AND COPD OVERLAP SYNDROME: Status: ACTIVE | Noted: 2023-06-10

## 2023-06-10 LAB
ANION GAP SERPL CALCULATED.3IONS-SCNC: 8.9 MMOL/L (ref 5–15)
BUN SERPL-MCNC: 45 MG/DL (ref 8–23)
BUN/CREAT SERPL: 23.9 (ref 7–25)
CALCIUM SPEC-SCNC: 8 MG/DL (ref 8.6–10.5)
CHLORIDE SERPL-SCNC: 98 MMOL/L (ref 98–107)
CO2 SERPL-SCNC: 24.1 MMOL/L (ref 22–29)
CREAT SERPL-MCNC: 1.88 MG/DL (ref 0.76–1.27)
DEPRECATED RDW RBC AUTO: 45.3 FL (ref 37–54)
EGFRCR SERPLBLD CKD-EPI 2021: 36.3 ML/MIN/1.73
ERYTHROCYTE [DISTWIDTH] IN BLOOD BY AUTOMATED COUNT: 13.5 % (ref 12.3–15.4)
GEN 5 2HR TROPONIN T REFLEX: 20 NG/L
GLUCOSE BLDC GLUCOMTR-MCNC: 114 MG/DL (ref 70–130)
GLUCOSE BLDC GLUCOMTR-MCNC: 146 MG/DL (ref 70–130)
GLUCOSE BLDC GLUCOMTR-MCNC: 226 MG/DL (ref 70–130)
GLUCOSE BLDC GLUCOMTR-MCNC: 240 MG/DL (ref 70–130)
GLUCOSE SERPL-MCNC: 241 MG/DL (ref 65–99)
HCT VFR BLD AUTO: 37 % (ref 37.5–51)
HGB BLD-MCNC: 12 G/DL (ref 13–17.7)
MCH RBC QN AUTO: 29.9 PG (ref 26.6–33)
MCHC RBC AUTO-ENTMCNC: 32.4 G/DL (ref 31.5–35.7)
MCV RBC AUTO: 92 FL (ref 79–97)
PLATELET # BLD AUTO: 98 10*3/MM3 (ref 140–450)
PMV BLD AUTO: 11.4 FL (ref 6–12)
POTASSIUM SERPL-SCNC: 6.2 MMOL/L (ref 3.5–5.2)
POTASSIUM SERPL-SCNC: 6.4 MMOL/L (ref 3.5–5.2)
QT INTERVAL: 453 MS
RBC # BLD AUTO: 4.02 10*6/MM3 (ref 4.14–5.8)
SODIUM SERPL-SCNC: 131 MMOL/L (ref 136–145)
TROPONIN T DELTA: -1 NG/L
TSH SERPL DL<=0.05 MIU/L-ACNC: 1.4 UIU/ML (ref 0.27–4.2)
WBC NRBC COR # BLD: 2.89 10*3/MM3 (ref 3.4–10.8)

## 2023-06-10 PROCEDURE — 25010000002 METHYLPREDNISOLONE PER 40 MG: Performed by: INTERNAL MEDICINE

## 2023-06-10 PROCEDURE — 63710000001 INSULIN LISPRO (HUMAN) PER 5 UNITS: Performed by: NURSE PRACTITIONER

## 2023-06-10 PROCEDURE — 85027 COMPLETE CBC AUTOMATED: CPT | Performed by: INTERNAL MEDICINE

## 2023-06-10 PROCEDURE — 94799 UNLISTED PULMONARY SVC/PX: CPT

## 2023-06-10 PROCEDURE — 94761 N-INVAS EAR/PLS OXIMETRY MLT: CPT

## 2023-06-10 PROCEDURE — 82948 REAGENT STRIP/BLOOD GLUCOSE: CPT

## 2023-06-10 PROCEDURE — 84484 ASSAY OF TROPONIN QUANT: CPT | Performed by: INTERNAL MEDICINE

## 2023-06-10 PROCEDURE — 93010 ELECTROCARDIOGRAM REPORT: CPT | Performed by: INTERNAL MEDICINE

## 2023-06-10 PROCEDURE — 25010000002 FUROSEMIDE PER 20 MG: Performed by: INTERNAL MEDICINE

## 2023-06-10 PROCEDURE — 84443 ASSAY THYROID STIM HORMONE: CPT | Performed by: NURSE PRACTITIONER

## 2023-06-10 PROCEDURE — 94664 DEMO&/EVAL PT USE INHALER: CPT

## 2023-06-10 PROCEDURE — 80048 BASIC METABOLIC PNL TOTAL CA: CPT | Performed by: INTERNAL MEDICINE

## 2023-06-10 PROCEDURE — 93005 ELECTROCARDIOGRAM TRACING: CPT | Performed by: NURSE PRACTITIONER

## 2023-06-10 PROCEDURE — 84132 ASSAY OF SERUM POTASSIUM: CPT | Performed by: NURSE PRACTITIONER

## 2023-06-10 RX ORDER — IBUPROFEN 600 MG/1
1 TABLET ORAL
Status: DISCONTINUED | OUTPATIENT
Start: 2023-06-10 | End: 2023-06-14 | Stop reason: HOSPADM

## 2023-06-10 RX ORDER — DEXTROSE MONOHYDRATE 25 G/50ML
25 INJECTION, SOLUTION INTRAVENOUS
Status: DISCONTINUED | OUTPATIENT
Start: 2023-06-10 | End: 2023-06-14 | Stop reason: HOSPADM

## 2023-06-10 RX ORDER — NICOTINE POLACRILEX 4 MG
15 LOZENGE BUCCAL
Status: DISCONTINUED | OUTPATIENT
Start: 2023-06-10 | End: 2023-06-14 | Stop reason: HOSPADM

## 2023-06-10 RX ORDER — INSULIN LISPRO 100 [IU]/ML
2-7 INJECTION, SOLUTION INTRAVENOUS; SUBCUTANEOUS
Status: DISCONTINUED | OUTPATIENT
Start: 2023-06-10 | End: 2023-06-13

## 2023-06-10 RX ORDER — BUDESONIDE AND FORMOTEROL FUMARATE DIHYDRATE 160; 4.5 UG/1; UG/1
2 AEROSOL RESPIRATORY (INHALATION)
Status: DISCONTINUED | OUTPATIENT
Start: 2023-06-10 | End: 2023-06-14 | Stop reason: HOSPADM

## 2023-06-10 RX ORDER — NITROGLYCERIN 0.4 MG/1
0.4 TABLET SUBLINGUAL
Status: DISCONTINUED | OUTPATIENT
Start: 2023-06-10 | End: 2023-06-14 | Stop reason: HOSPADM

## 2023-06-10 RX ADMIN — HYDROCODONE BITARTRATE AND ACETAMINOPHEN 1 TABLET: 10; 325 TABLET ORAL at 00:53

## 2023-06-10 RX ADMIN — FUROSEMIDE 40 MG: 10 INJECTION, SOLUTION INTRAMUSCULAR; INTRAVENOUS at 10:00

## 2023-06-10 RX ADMIN — ACETAMINOPHEN 650 MG: 325 TABLET ORAL at 20:39

## 2023-06-10 RX ADMIN — GABAPENTIN 300 MG: 300 CAPSULE ORAL at 18:02

## 2023-06-10 RX ADMIN — IPRATROPIUM BROMIDE AND ALBUTEROL SULFATE 3 ML: .5; 3 SOLUTION RESPIRATORY (INHALATION) at 20:58

## 2023-06-10 RX ADMIN — HYDROCODONE BITARTRATE AND ACETAMINOPHEN 1 TABLET: 10; 325 TABLET ORAL at 20:18

## 2023-06-10 RX ADMIN — INSULIN LISPRO 3 UNITS: 100 INJECTION, SOLUTION INTRAVENOUS; SUBCUTANEOUS at 11:53

## 2023-06-10 RX ADMIN — IPRATROPIUM BROMIDE AND ALBUTEROL SULFATE 3 ML: .5; 3 SOLUTION RESPIRATORY (INHALATION) at 07:17

## 2023-06-10 RX ADMIN — FUROSEMIDE 40 MG: 10 INJECTION, SOLUTION INTRAMUSCULAR; INTRAVENOUS at 22:10

## 2023-06-10 RX ADMIN — METHYLPREDNISOLONE SODIUM SUCCINATE 40 MG: 40 INJECTION, POWDER, FOR SOLUTION INTRAMUSCULAR; INTRAVENOUS at 13:00

## 2023-06-10 RX ADMIN — SODIUM ZIRCONIUM CYCLOSILICATE 10 G: 10 POWDER, FOR SUSPENSION ORAL at 13:00

## 2023-06-10 RX ADMIN — GABAPENTIN 300 MG: 300 CAPSULE ORAL at 20:15

## 2023-06-10 RX ADMIN — DOCUSATE SODIUM 50MG AND SENNOSIDES 8.6MG 2 TABLET: 8.6; 5 TABLET, FILM COATED ORAL at 10:00

## 2023-06-10 RX ADMIN — LEVOTHYROXINE SODIUM 25 MCG: 0.03 TABLET ORAL at 06:08

## 2023-06-10 RX ADMIN — IPRATROPIUM BROMIDE AND ALBUTEROL SULFATE 3 ML: .5; 3 SOLUTION RESPIRATORY (INHALATION) at 00:57

## 2023-06-10 RX ADMIN — METHYLPREDNISOLONE SODIUM SUCCINATE 40 MG: 40 INJECTION, POWDER, FOR SOLUTION INTRAMUSCULAR; INTRAVENOUS at 22:10

## 2023-06-10 RX ADMIN — GABAPENTIN 300 MG: 300 CAPSULE ORAL at 10:00

## 2023-06-10 RX ADMIN — BUPROPION HYDROCHLORIDE 150 MG: 150 TABLET, EXTENDED RELEASE ORAL at 10:00

## 2023-06-10 RX ADMIN — AMLODIPINE BESYLATE 10 MG: 10 TABLET ORAL at 10:00

## 2023-06-10 RX ADMIN — METHYLPREDNISOLONE SODIUM SUCCINATE 40 MG: 40 INJECTION, POWDER, FOR SOLUTION INTRAMUSCULAR; INTRAVENOUS at 06:08

## 2023-06-10 RX ADMIN — ATORVASTATIN CALCIUM 40 MG: 20 TABLET, FILM COATED ORAL at 20:15

## 2023-06-10 RX ADMIN — BUDESONIDE AND FORMOTEROL FUMARATE DIHYDRATE 2 PUFF: 160; 4.5 AEROSOL RESPIRATORY (INHALATION) at 21:04

## 2023-06-10 NOTE — PROGRESS NOTES
Name: Brandon Alan ADMIT: 2023   : 1945  PCP: Wong Cuellar MD    MRN: [id number] LOS: 0 days   AGE/SEX: 78 y.o. male  ROOM: [facility]     Subjective   Subjective   He reports increased shortness of breath over the last few weeks.  He has had ongoing issues with off-and-on shortness of breath at home since last fall.  Has been to multiple providers including pulmonologist for evaluation.  He states he was initiated on some inhalers but is unsure of the names of them.  One of the inhalers he was unable to tolerate due to increased urinary incontinence and urgency with it.  He states over the last few weeks he has had increased swelling to lower extremities and then the last few days noted significant orthopnea.  He has a history of obstructive sleep apnea but is currently not on PAP therapy due to financial issues.  He was started on nasal cannula O2 at night at 2 L to assist with his nocturnal hypoxemia about 6 weeks ago.  He states he drinks large amount of water at home, Over several liters a day.  Does not have a scale at home.    Review of systems:  General: Denies fever or chills.  Appetite good  Cardiovascular: Denies chest pain.  Positive orthopnea  Respiratory: Denies cough, + HAILE  GI: Denies nausea or vomiting or abdominal pain     Objective   Objective   Vital Signs  Temp:  [96.9 °F (36.1 °C)-97.6 °F (36.4 °C)] 96.9 °F (36.1 °C)  Heart Rate:  [51-77] 64  Resp:  [16-22] 20  BP: ()/(44-83) 93/44  SpO2:  [90 %-97 %] 93 %  on  Flow (L/min):  [2-3] 3;   Device (Oxygen Therapy): nasal cannula  Body mass index is 48.8 kg/m².    Physical Exam  Vitals and nursing note reviewed.   Constitutional:       General: He is not in acute distress.     Appearance: He is obese. He is ill-appearing (Chronically ill-appearing).   Eyes:      General: No scleral icterus.     Conjunctiva/sclera: Conjunctivae normal.   Cardiovascular:      Rate and Rhythm: Normal rate and regular rhythm.      Pulses:  Normal pulses.      Heart sounds: Normal heart sounds. No murmur heard.  Pulmonary:      Effort: No respiratory distress.      Breath sounds: Normal breath sounds.      Comments: Mild conversational dyspnea noted  Abdominal:      General: Bowel sounds are normal. There is distension.      Tenderness: There is no abdominal tenderness. There is no guarding.      Comments: Obese   Musculoskeletal:      Right lower leg: Edema present.      Left lower leg: Edema present.      Comments: 2+ edema up just past his knees   Skin:     General: Skin is warm and dry.      Capillary Refill: Capillary refill takes less than 2 seconds.   Neurological:      General: No focal deficit present.      Mental Status: He is alert and oriented to person, place, and time. Mental status is at baseline.   Psychiatric:         Mood and Affect: Mood normal.         Behavior: Behavior normal.         Thought Content: Thought content normal.         Judgment: Judgment normal.         Results Review  I reviewed the patient's new clinical results.  Results from last 7 days   Lab Units 06/10/23  0439 06/09/23  1431   WBC 10*3/mm3 2.89* 3.46   HEMOGLOBIN g/dL 12.0* 12.0*   PLATELETS 10*3/mm3 98* 85*     Results from last 7 days   Lab Units 06/10/23  0631 06/10/23  0439 06/09/23  1431   SODIUM mmol/L  --  131* 138   POTASSIUM mmol/L 6.2* 6.4* 4.9   CHLORIDE mmol/L  --  98 104   CO2 mmol/L  --  24.1 26.2   BUN mg/dL  --  45* 33*   CREATININE mg/dL  --  1.88* 1.39*   GLUCOSE mg/dL  --  241* 96     Lab Results   Component Value Date    ANIONGAP 8.9 06/10/2023     Estimated Creatinine Clearance: 54.9 mL/min (A) (by C-G formula based on SCr of 1.88 mg/dL (H)).    Results from last 7 days   Lab Units 06/09/23  1431   ALBUMIN g/dL 2.9*   BILIRUBIN mg/dL 0.5   ALK PHOS U/L 74   AST (SGOT) U/L 33   ALT (SGPT) U/L 22     Results from last 7 days   Lab Units 06/10/23  0439 06/09/23  1431   CALCIUM mg/dL 8.0* 8.4*   ALBUMIN g/dL  --  2.9*       Glucose   Date/Time  Value Ref Range Status   06/09/2023 1728 68 (L) 70 - 130 mg/dL Final     Comment:     Meter: [id number] : [id number] Fiorella King KELLY       XR Chest 2 View    Result Date: 6/9/2023  No focal pulmonary consolidation. Cardiomegaly with pulmonary vascular congestion with minimal pleural effusions.  This report was finalized on 6/9/2023 4:05 PM by Dr. Sudeep Boothe M.D.         Current Facility-Administered Medications:     acetaminophen (TYLENOL) tablet 650 mg, 650 mg, Oral, Q4H PRN, Anna Nolasco MD    albuterol (PROVENTIL) nebulizer solution 0.083% 2.5 mg/3mL, 2.5 mg, Nebulization, Q6H PRN, Anna Nolasco MD    amLODIPine (NORVASC) tablet 10 mg, 10 mg, Oral, Q24H, Anna Nolasco MD, 10 mg at 06/10/23 1000    atorvastatin (LIPITOR) tablet 40 mg, 40 mg, Oral, Nightly, Anna Nolasco MD    sennosides-docusate (PERICOLACE) 8.6-50 MG per tablet 2 tablet, 2 tablet, Oral, BID, 2 tablet at 06/10/23 1000 **AND** polyethylene glycol (MIRALAX) packet 17 g, 17 g, Oral, Daily PRN **AND** bisacodyl (DULCOLAX) EC tablet 5 mg, 5 mg, Oral, Daily PRN **AND** bisacodyl (DULCOLAX) suppository 10 mg, 10 mg, Rectal, Daily PRN, Anna Nolasco MD    buPROPion SR (WELLBUTRIN SR) 12 hr tablet 150 mg, 150 mg, Oral, Daily, Anna Nolasco MD, 150 mg at 06/10/23 1000    dextrose (D50W) (25 g/50 mL) IV injection 25 g, 25 g, Intravenous, Q15 Min PRN, Soo Jaramillo APRAURORA    dextrose (GLUTOSE) oral gel 15 g, 15 g, Oral, Q15 Min PRN, Soo Jaramillo APRAURORA    furosemide (LASIX) injection 40 mg, 40 mg, Intravenous, Q12H, Anna Nolasco MD, 40 mg at 06/10/23 1000    gabapentin (NEURONTIN) capsule 300 mg, 300 mg, Oral, TID, Anna Nolasco MD, 300 mg at 06/10/23 1000    glucagon (GLUCAGEN) injection 1 mg, 1 mg, Intramuscular, Q15 Min PRN, Soo Jaramillo APRN    HYDROcodone-acetaminophen (NORCO)  MG per tablet 1 tablet, 1 tablet, Oral, Q6H PRN, Anna Nolasco,  MD, 1 tablet at 06/10/23 0053    insulin lispro (HUMALOG/ADMELOG) injection 2-7 Units, 2-7 Units, Subcutaneous, 4x Daily AC & at Bedtime, Soo Jaramillo APRN    ipratropium-albuterol (DUO-NEB) nebulizer solution 3 mL, 3 mL, Nebulization, Q6H While Awake - RT, Anna Nolasco MD, 3 mL at 06/10/23 0717    levothyroxine (SYNTHROID, LEVOTHROID) tablet 25 mcg, 25 mcg, Oral, Q AM, Anna Nolasco MD, 25 mcg at 06/10/23 0608    melatonin tablet 3 mg, 3 mg, Oral, Nightly PRN, Anna Nolasco MD    methylPREDNISolone sodium succinate (SOLU-Medrol) injection 40 mg, 40 mg, Intravenous, Q8H, Anna Nolasco MD, 40 mg at 06/10/23 0608    nitroglycerin (NITROSTAT) SL tablet 0.4 mg, 0.4 mg, Sublingual, Q5 Min PRN, Soo Jaramillo APRN    ondansetron (ZOFRAN) tablet 4 mg, 4 mg, Oral, Q6H PRN **OR** ondansetron (ZOFRAN) injection 4 mg, 4 mg, Intravenous, Q6H PRN, Anna Nolasco MD    sodium chloride 0.9 % flush 10 mL, 10 mL, Intravenous, PRN, Medardo Puente MD    sodium zirconium cyclosilicate (LOKELMA) pack 10 g, 10 g, Oral, Once, Soo Jaramillo APRN       Diet  Diet: Cardiac Diets; Healthy Heart (2-3 Na+); Texture: Regular Texture (IDDSI 7); Fluid Consistency: Thin (IDDSI 0)    Estimated Creatinine Clearance: 54.9 mL/min (A) (by C-G formula based on SCr of 1.88 mg/dL (H)).      Radiology Reports  [facility]         Results -  Exam Date Time Procedure Performing Provider Status   5/2/23 1:02 PM CT Chest WO RAY, JEY CT Tech; Auth (Verified)   Notes:   (CT Chest WO) Reason For Exam: Other disorder of lung;Other disorder of lung   REPORT  EXAM: CT Chest WO    NUMBER OF IMAGES: 688    Examination: CT of the chest without intravenous contrast    Clinical statement: 77 years old Male with chronic shortness of breath 4 2-3 years    Technique: CT of the chest was performed from the apices of the lungs through the upper abdomen using contiguous 5 mm transaxial sections, following standard  protocol. Reformations were constructed in the coronal and sagittal planes (5 mm). Images were reviewed in soft tissue, bone and lung windows.    Images were reconstructed, using maximum intensity projection (MIP) and volume rendered (3D) datasets.    Low-dose CT acquisition technique included one of following options:  1. Automated exposure control  2. Adjustment of MA and or KV according to patient's size or  3. Use of iterative reconstruction.    Comparison: No prior studies available for comparison.    Findings:  Lung parenchyma and pleura: The tracheobronchial tree is patent. There is a 2 mm soft tissue nodule in the right lung, along the fissure margin (series 2, image 40). There are small bilateral pleural effusions with associated compressive atelectasis. There are multiple radiopaque foreign bodies of metallic density embedded in the left lung, consistent with a history of penetrating trauma. There is no pneumothorax. There is mild pleural-parenchymal thickening/scarring throughout the lungs bilaterally, right more so than left.    Thyroid: Grossly unremarkable.    Mediastinum: There are scattered prominent/enlarged mediastinal lymph nodes, with index lymph node located in the right paratracheal region, measuring 2.1 cm x 1.1 cm in size (series 2, image 21).    Chayo: There are subcentimeter calcified right hilar lymph nodes, consistent with a history of granulomatous disease. However, the chayo are suboptimally evaluated due to lack of intravenous contrast.    Heart and pericardium: The heart is enlarged. There is no pericardial effusion. There are mild coronary artery calcifications. There is dilatation of the right pulmonary artery, measuring 3.3 cm in width (series 2, image 28). There is dilatation of the left pulmonary artery, measuring 3.1 cm in width (series 2, image 26). There is dilatation of the main pulmonary artery, measuring 3.5 cm in maximum diameter (series 2, image 27).    Chest wall: There  are multiple radiopaque foreign bodies metallic density embedded in the left chest wall, consistent with a history of penetrating trauma.    Axilla: No enlarged lymph nodes.    Visualized upper abdomen: The liver has a cirrhotic morphology. The remaining visualized upper abdominal organs are grossly unremarkable.    Bones: There are degenerative changes of the spine. There are chronic fracture deformities involving multiple left ribs.       Estimated Creatinine Clearance: 54.9 mL/min (A) (by C-G formula based on SCr of 1.88 mg/dL (H)).    Assessment/Plan     Active Hospital Problems    Diagnosis  POA    **Acute on chronic congestive heart failure, unspecified heart failure type [I50.9]  Yes    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     78 y.o. male     Acute on chronic HF  - proBNP 643  - xray chest showing Cardiomegaly with pulmonary vascular congestion and pleural effusions.  [facility] CT imaging done back in May did show pleural effusion with compression atelectasis at that time  - continue diuresing with Lasix 40 mg BID.  Will defer further diuretic adjustments to nephrology given his current renal function.   -Unable to access previous echocardiograms performed at [facility].  Noted most recent 1 / 2023.  From what I gathered per his take on results he was told that he had enlarged blood vessels noted on echo.  - Check transthoracic Echo given this latest decompensation feel its warranted.  -Check TSH    Hyperkalemia/ CKD3  - K+ 4.9 on admit up to 6.4 with recheck 6.2 this am.   -Give Lokelma one time dose.      - nephrology consult  - lasix 40 mg BID IV.   -He is on p.o. potassium as an outpatient he states that he does not always take it as prescribed.   Does consume multiple high potassium foods at home such as avocados,  bananas, apricots  - EKG showing tall peaked T wave likely from elevated K+.   HS Troponin T elevated at 20 with delta - 1 in setting of CKD.  No angina.   -Check postvoid residual.  He states he is not putting out much urine since his last Lasix dose.    DM type 2 with peripheral neuropathy  - hold metformin   - Check A1c  -Noted hypoglycemia last night 68 with repeat glucose this am 241  - FSBS ordered.  SSI low dose coverage.    - hypoglycemia treatment per hospital protocol.   -He is on high-dose gabapentin 900 mg 3 times daily.  He does see a pain management doctor Dr Glenroy Nichols who manages this and his Norco.    HTN  -Is on amlodipine and atorvastatin combo as outpatient  -His amlodipine is likely contributing some to his peripheral edema but he does report he does wear support hose as an outpatient.  Continue for now as this is kidney friendly.     Chronic obstructive pulmonary disease / dyspnea on exertion/ right lung pulmonary nodule/ pulmonary HTN:  -Based on his reports it appears that he has undergone significant pulmonary work-up to include pulmonary function testing as well as CT imaging.  Based on care everywhere records it appears that he is followed by [facility] pulmonologist Dr. Jeronimo.   -Aced on this office visit on 4/14/2023 he did not tolerate a in OR oh which caused issues with blurred vision, dry mouth.  And issues with renal incontinence.  He was then started on ProAir Respiclick and was changed to Symbicort 160/4.5  [facility] did show small bilateral pleural effusions with associated compression atelectasis, cardiomegaly with dilation of the pulmonary vasculature consistent with pulmonary artery hypertension, mediastinal lymphadenopathy, right pulmonary nodule ([facility] radiology recommended f/u with Fleishner criteria) and hepatic cirrhosis.    Hypothyroidism:   -Check TSH and reflex  free T4  -Continue current home dose of levothyroxine    SCD's for DVT prophylaxis    Discussed with patient and nursing staff    Discharge: To be determined based on clinical course.  We will need further diuresis before we initiate any PT or OT.    MARGUERITE Barba  [facility]

## 2023-06-10 NOTE — NURSING NOTE
Called in potassium. Stat redraw ordered waiting for result. Up to 3L nc. Iv lasix 40 q12h ordered. Solumedrol ordered.

## 2023-06-10 NOTE — H&P
HISTORY AND PHYSICAL   [facility]        Date of Admission: 2023  Patient Identification:  Name: Brandon Alan  Age: 78 y.o.  Sex: male  :  1945  MRN: [id number]                     Primary Care Physician: Wong Cuellar MD    Chief Complaint:  78 year old gentleman who presented to the emergency room with shortness of breath; he has some chronic dyspnea but has felt worse for the last few weeks; he denies chest pain; he has a history of copd and is on home oxygen; he has noted leg swelling and feels worse with lying down; shortness of breath is worse with exertion; no fever or chills     History of Present Illness:   As above    Past Medical History:  Past Medical History:   Diagnosis Date   • Depression    • Diabetes mellitus    • Hypertension      Past Surgical History:  History reviewed. No pertinent surgical history.   Home Meds:  Medications Prior to Admission   Medication Sig Dispense Refill Last Dose   • albuterol sulfate  (90 Base) MCG/ACT inhaler Inhale 2 puffs Every 4 (Four) Hours As Needed for Wheezing or Shortness of Air. 1 inhaler 0 2023   • amLODIPine-atorvastatin (CADUET) 10-40 MG per tablet Take 1 tablet by mouth Daily.   2023   • buPROPion SR (WELLBUTRIN SR) 150 MG 12 hr tablet Take 1 tablet by mouth Daily.   2023   • gabapentin (NEURONTIN) 300 MG capsule Take 1 capsule by mouth 3 (Three) Times a Day.   2023 at 0600   • HYDROcodone-acetaminophen (NORCO)  MG per tablet Take 1 tablet by mouth Every 6 (Six) Hours As Needed for Moderate Pain.   2023   • levothyroxine (SYNTHROID, LEVOTHROID) 25 MCG tablet Take 1 tablet by mouth Daily.   2023   • meloxicam (MOBIC) 15 MG tablet Take 1 tablet by mouth Daily.   2023   • metFORMIN (GLUCOPHAGE) 850 MG tablet Take 1 tablet by mouth 2 (Two) Times a Day With Meals.   2023 at 0700       Allergies:  Allergies   Allergen Reactions   • Penicillins      Immunizations:    There is no  "immunization history on file for this patient.  Social History:   Social History     Social History Narrative   • Not on file     Social History     Socioeconomic History   • Marital status: Single   Tobacco Use   • Smoking status: Never   Vaping Use   • Vaping Use: Never used   Substance and Sexual Activity   • Alcohol use: No   • Drug use: No   • Sexual activity: Defer       Family History:  History reviewed. No pertinent family history.     Review of Systems  See history of present illness and past medical history.  Patient denies headache, dizziness, syncope, falls, trauma, change in vision, change in hearing, change in taste, changes in weight, changes in appetite, focal weakness, numbness, or paresthesia.  Patient denies chest pain, palpitations sinus pressure, rhinorrhea, epistaxis, hemoptysis, nausea, vomiting,hematemesis, diarrhea, constipation or hematochezia.  Denies cold or heat intolerance, polydipsia, polyuria, polyphagia. Denies hematuria, pyuria, dysuria, hesitancy, frequency or urgency. Denies consumption of raw and under cooked meats foods or change in water source.  Denies fever, chills, sweats, night sweats.  Denies missing any routine medications. Remainder of ROS is negative.    Objective:  T Max 24 hrs: Temp (24hrs), Av.3 °F (36.3 °C), Min:97 °F (36.1 °C), Max:97.6 °F (36.4 °C)    Vitals Ranges:   Temp:  [97 °F (36.1 °C)-97.6 °F (36.4 °C)] 97.6 °F (36.4 °C)  Heart Rate:  [51-58] 52  Resp:  [16-22] 20  BP: ()/(53-64) 96/53      Exam:  BP 96/53 (BP Location: Left arm, Patient Position: Sitting)   Pulse 52   Temp 97.6 °F (36.4 °C) (Oral)   Resp 20   Ht 188 cm (74\")   SpO2 90%   BMI 43.44 kg/m²     General Appearance:    Alert, cooperative, no distress, appears stated age   Head:    Normocephalic, without obvious abnormality, atraumatic   Eyes:    PERRL, conjunctivae/corneas clear, EOM's intact, both eyes   Ears:    Normal external ear canals, both ears   Nose:   Nares normal, " septum midline, mucosa normal, no drainage    or sinus tenderness   Throat:   Lips, mucosa, and tongue normal   Neck:   Supple, symmetrical, trachea midline, no adenopathy;     thyroid:  no enlargement/tenderness/nodules; no carotid    bruit or JVD   Back:     Symmetric, no curvature, ROM normal, no CVA tenderness   Lungs:     Decreased breath sounds bilaterally, respirations unlabored   Chest Wall:    No tenderness or deformity    Heart:    Regular rate and rhythm, S1 and S2 normal, no murmur, rub   or gallop   Abdomen:     Soft, nontender, bowel sounds active all four quadrants,     no masses, no hepatomegaly, no splenomegaly   Extremities:   Extremities normal, atraumatic, no cyanosis or edema   Pulses:   2+ and symmetric all extremities   Skin:   Skin color, texture, turgor normal, no rashes or lesions               .    Data Review:  Labs in chart were reviewed.  WBC   Date Value Ref Range Status   06/09/2023 3.46 3.40 - 10.80 10*3/mm3 Final     Hemoglobin   Date Value Ref Range Status   06/09/2023 12.0 (L) 13.0 - 17.7 g/dL Final     Hematocrit   Date Value Ref Range Status   06/09/2023 36.1 (L) 37.5 - 51.0 % Final     Platelets   Date Value Ref Range Status   06/09/2023 85 (L) 140 - 450 10*3/mm3 Final     Sodium   Date Value Ref Range Status   06/09/2023 138 136 - 145 mmol/L Final     Potassium   Date Value Ref Range Status   06/09/2023 4.9 3.5 - 5.2 mmol/L Final     Chloride   Date Value Ref Range Status   06/09/2023 104 98 - 107 mmol/L Final     CO2   Date Value Ref Range Status   06/09/2023 26.2 22.0 - 29.0 mmol/L Final     BUN   Date Value Ref Range Status   06/09/2023 33 (H) 8 - 23 mg/dL Final     Creatinine   Date Value Ref Range Status   06/09/2023 1.39 (H) 0.76 - 1.27 mg/dL Final     Glucose   Date Value Ref Range Status   06/09/2023 96 65 - 99 mg/dL Final     Calcium   Date Value Ref Range Status   06/09/2023 8.4 (L) 8.6 - 10.5 mg/dL Final                Imaging Results (All)     Procedure Component  Value Units Date/Time    XR Chest 2 View [[id number]] Collected: 06/09/23 1602     Updated: 06/09/23 1608    Narrative:      XR CHEST 2 VW-     HISTORY: Male who is 78 years-old,  short of breath     TECHNIQUE: Frontal and lateral views of the chest     COMPARISON: 05/02/2019     FINDINGS:     The heart is enlarged. Pulmonary vasculature is congested. No focal  pulmonary consolidation. Minimal pleural effusions are suggested. No  pneumothorax. Otherwise stable.       Impression:      No focal pulmonary consolidation. Cardiomegaly with  pulmonary vascular congestion with minimal pleural effusions.     This report was finalized on 6/9/2023 4:05 PM by Dr. Sudeep Boothe M.D.               Assessment:  Active Hospital Problems    Diagnosis  POA   • **Acute on chronic congestive heart failure, unspecified heart failure type [I50.9]  Yes      Resolved Hospital Problems   No resolved problems to display.   chronic hypoxic respiratory failure  Obesity  Diabetes  Hypertension  ckd3    Plan:  Continue diuresis  Trend troponin  accu checks, sliding scale insulin  Monitor bp  Monitor on telemetry  dw patient and ed provider    Anna Nolasco MD  6/9/2023  20:45 EDT

## 2023-06-11 ENCOUNTER — APPOINTMENT (OUTPATIENT)
Dept: ULTRASOUND IMAGING | Facility: HOSPITAL | Age: 78
End: 2023-06-11
Payer: MEDICARE

## 2023-06-11 ENCOUNTER — APPOINTMENT (OUTPATIENT)
Dept: CARDIOLOGY | Facility: HOSPITAL | Age: 78
End: 2023-06-11
Payer: MEDICARE

## 2023-06-11 LAB
ALBUMIN SERPL-MCNC: 3.5 G/DL (ref 3.5–5.2)
ALBUMIN/GLOB SERPL: 0.9 G/DL
ALP SERPL-CCNC: 101 U/L (ref 39–117)
ALT SERPL W P-5'-P-CCNC: 19 U/L (ref 1–41)
ANION GAP SERPL CALCULATED.3IONS-SCNC: 7 MMOL/L (ref 5–15)
ANION GAP SERPL CALCULATED.3IONS-SCNC: 9.7 MMOL/L (ref 5–15)
AORTIC DIMENSIONLESS INDEX: 0.7 (DI)
AST SERPL-CCNC: 24 U/L (ref 1–40)
BH CV ECHO MEAS - AO MAX PG: 5.6 MMHG
BH CV ECHO MEAS - AO MEAN PG: 3 MMHG
BH CV ECHO MEAS - AO ROOT DIAM: 3.5 CM
BH CV ECHO MEAS - AO V2 MAX: 118.1 CM/SEC
BH CV ECHO MEAS - AO V2 VTI: 28 CM
BH CV ECHO MEAS - AVA(I,D): 2.8 CM2
BH CV ECHO MEAS - EDV(CUBED): 57.3 ML
BH CV ECHO MEAS - EDV(MOD-SP2): 136 ML
BH CV ECHO MEAS - EDV(MOD-SP4): 129 ML
BH CV ECHO MEAS - EF(MOD-BP): 51.7 %
BH CV ECHO MEAS - EF(MOD-SP2): 50.7 %
BH CV ECHO MEAS - EF(MOD-SP4): 51.2 %
BH CV ECHO MEAS - ESV(CUBED): 24.8 ML
BH CV ECHO MEAS - ESV(MOD-SP2): 67 ML
BH CV ECHO MEAS - ESV(MOD-SP4): 63 ML
BH CV ECHO MEAS - FS: 24.4 %
BH CV ECHO MEAS - IVS/LVPW: 1.06 CM
BH CV ECHO MEAS - IVSD: 1.24 CM
BH CV ECHO MEAS - LAT PEAK E' VEL: 10.1 CM/SEC
BH CV ECHO MEAS - LV MASS(C)D: 157.8 GRAMS
BH CV ECHO MEAS - LV MAX PG: 4.2 MMHG
BH CV ECHO MEAS - LV MEAN PG: 2.01 MMHG
BH CV ECHO MEAS - LV V1 MAX: 102.8 CM/SEC
BH CV ECHO MEAS - LV V1 VTI: 19.3 CM
BH CV ECHO MEAS - LVIDD: 3.9 CM
BH CV ECHO MEAS - LVIDS: 2.9 CM
BH CV ECHO MEAS - LVOT AREA: 4 CM2
BH CV ECHO MEAS - LVOT DIAM: 2.26 CM
BH CV ECHO MEAS - LVPWD: 1.17 CM
BH CV ECHO MEAS - MED PEAK E' VEL: 9.7 CM/SEC
BH CV ECHO MEAS - MV A DUR: 0.17 SEC
BH CV ECHO MEAS - MV A MAX VEL: 84.7 CM/SEC
BH CV ECHO MEAS - MV DEC SLOPE: 536.5 CM/SEC2
BH CV ECHO MEAS - MV DEC TIME: 0.17 MSEC
BH CV ECHO MEAS - MV E MAX VEL: 65.7 CM/SEC
BH CV ECHO MEAS - MV E/A: 0.78
BH CV ECHO MEAS - MV MAX PG: 10.4 MMHG
BH CV ECHO MEAS - MV MEAN PG: 4.6 MMHG
BH CV ECHO MEAS - MV P1/2T: 88.9 MSEC
BH CV ECHO MEAS - MV V2 VTI: 46.5 CM
BH CV ECHO MEAS - MVA(P1/2T): 2.47 CM2
BH CV ECHO MEAS - MVA(VTI): 1.67 CM2
BH CV ECHO MEAS - PA ACC TIME: 0.08 SEC
BH CV ECHO MEAS - PA V2 MAX: 108.6 CM/SEC
BH CV ECHO MEAS - PULM A REVS DUR: 0.15 SEC
BH CV ECHO MEAS - PULM A REVS VEL: 23.7 CM/SEC
BH CV ECHO MEAS - PULM DIAS VEL: 19.3 CM/SEC
BH CV ECHO MEAS - PULM S/D: 1.3
BH CV ECHO MEAS - PULM SYS VEL: 25 CM/SEC
BH CV ECHO MEAS - QP/QS: 1.29
BH CV ECHO MEAS - RV MAX PG: 2.05 MMHG
BH CV ECHO MEAS - RV V1 MAX: 71.6 CM/SEC
BH CV ECHO MEAS - RV V1 VTI: 17.4 CM
BH CV ECHO MEAS - RVOT DIAM: 2.7 CM
BH CV ECHO MEAS - SV(LVOT): 77.7 ML
BH CV ECHO MEAS - SV(MOD-SP2): 69 ML
BH CV ECHO MEAS - SV(MOD-SP4): 66 ML
BH CV ECHO MEAS - SV(RVOT): 100.4 ML
BH CV ECHO MEAS - TAPSE (>1.6): 2.7 CM
BH CV ECHO MEASUREMENTS AVERAGE E/E' RATIO: 6.64
BH CV XLRA - RV BASE: 3.5 CM
BH CV XLRA - RV LENGTH: 9.1 CM
BH CV XLRA - RV MID: 2.7 CM
BH CV XLRA - TDI S': 15.9 CM/SEC
BILIRUB SERPL-MCNC: 0.3 MG/DL (ref 0–1.2)
BILIRUB UR QL STRIP: NEGATIVE
BUN SERPL-MCNC: 55 MG/DL (ref 8–23)
BUN SERPL-MCNC: 60 MG/DL (ref 8–23)
BUN/CREAT SERPL: 29.3 (ref 7–25)
BUN/CREAT SERPL: 30.2 (ref 7–25)
CALCIUM SPEC-SCNC: 7.8 MG/DL (ref 8.6–10.5)
CALCIUM SPEC-SCNC: 8.8 MG/DL (ref 8.6–10.5)
CHLORIDE SERPL-SCNC: 101 MMOL/L (ref 98–107)
CHLORIDE SERPL-SCNC: 102 MMOL/L (ref 98–107)
CHLORIDE UR-SCNC: 33 MMOL/L
CLARITY UR: CLEAR
CO2 SERPL-SCNC: 26 MMOL/L (ref 22–29)
CO2 SERPL-SCNC: 26.3 MMOL/L (ref 22–29)
COLOR UR: YELLOW
CREAT SERPL-MCNC: 1.82 MG/DL (ref 0.76–1.27)
CREAT SERPL-MCNC: 2.05 MG/DL (ref 0.76–1.27)
CREAT UR-MCNC: 97.7 MG/DL
EGFRCR SERPLBLD CKD-EPI 2021: 32.8 ML/MIN/1.73
EGFRCR SERPLBLD CKD-EPI 2021: 37.8 ML/MIN/1.73
GLOBULIN UR ELPH-MCNC: 3.9 GM/DL
GLUCOSE BLDC GLUCOMTR-MCNC: 166 MG/DL (ref 70–130)
GLUCOSE BLDC GLUCOMTR-MCNC: 184 MG/DL (ref 70–130)
GLUCOSE BLDC GLUCOMTR-MCNC: 196 MG/DL (ref 70–130)
GLUCOSE BLDC GLUCOMTR-MCNC: 256 MG/DL (ref 70–130)
GLUCOSE SERPL-MCNC: 270 MG/DL (ref 65–99)
GLUCOSE SERPL-MCNC: 277 MG/DL (ref 65–99)
GLUCOSE UR STRIP-MCNC: NEGATIVE MG/DL
HBA1C MFR BLD: 5.5 % (ref 4.8–5.6)
HGB UR QL STRIP.AUTO: NEGATIVE
KETONES UR QL STRIP: NEGATIVE
LEFT ATRIUM VOLUME INDEX: 31.4 ML/M2
LEUKOCYTE ESTERASE UR QL STRIP.AUTO: NEGATIVE
NITRITE UR QL STRIP: NEGATIVE
PH UR STRIP.AUTO: 5.5 [PH] (ref 5–8)
POTASSIUM SERPL-SCNC: 5.7 MMOL/L (ref 3.5–5.2)
POTASSIUM SERPL-SCNC: 6.2 MMOL/L (ref 3.5–5.2)
PROT ?TM UR-MCNC: 7.8 MG/DL
PROT SERPL-MCNC: 7.4 G/DL (ref 6–8.5)
PROT UR QL STRIP: NEGATIVE
PROT/CREAT UR: 79.8 MG/G CREA (ref 0–200)
SINUS: 3.2 CM
SODIUM SERPL-SCNC: 134 MMOL/L (ref 136–145)
SODIUM SERPL-SCNC: 138 MMOL/L (ref 136–145)
SODIUM UR-SCNC: 35 MMOL/L
SP GR UR STRIP: 1.02 (ref 1–1.03)
UROBILINOGEN UR QL STRIP: NORMAL

## 2023-06-11 PROCEDURE — 83036 HEMOGLOBIN GLYCOSYLATED A1C: CPT | Performed by: NURSE PRACTITIONER

## 2023-06-11 PROCEDURE — 94664 DEMO&/EVAL PT USE INHALER: CPT

## 2023-06-11 PROCEDURE — 84156 ASSAY OF PROTEIN URINE: CPT | Performed by: INTERNAL MEDICINE

## 2023-06-11 PROCEDURE — 63710000001 INSULIN LISPRO (HUMAN) PER 5 UNITS: Performed by: NURSE PRACTITIONER

## 2023-06-11 PROCEDURE — 94799 UNLISTED PULMONARY SVC/PX: CPT

## 2023-06-11 PROCEDURE — 80053 COMPREHEN METABOLIC PANEL: CPT | Performed by: INTERNAL MEDICINE

## 2023-06-11 PROCEDURE — 94761 N-INVAS EAR/PLS OXIMETRY MLT: CPT

## 2023-06-11 PROCEDURE — 63710000001 INSULIN LISPRO (HUMAN) PER 5 UNITS: Performed by: INTERNAL MEDICINE

## 2023-06-11 PROCEDURE — 84300 ASSAY OF URINE SODIUM: CPT | Performed by: INTERNAL MEDICINE

## 2023-06-11 PROCEDURE — 82948 REAGENT STRIP/BLOOD GLUCOSE: CPT

## 2023-06-11 PROCEDURE — 25010000002 METHYLPREDNISOLONE PER 40 MG: Performed by: INTERNAL MEDICINE

## 2023-06-11 PROCEDURE — 82570 ASSAY OF URINE CREATININE: CPT | Performed by: INTERNAL MEDICINE

## 2023-06-11 PROCEDURE — 82436 ASSAY OF URINE CHLORIDE: CPT | Performed by: INTERNAL MEDICINE

## 2023-06-11 PROCEDURE — 76775 US EXAM ABDO BACK WALL LIM: CPT

## 2023-06-11 PROCEDURE — 81003 URINALYSIS AUTO W/O SCOPE: CPT | Performed by: INTERNAL MEDICINE

## 2023-06-11 PROCEDURE — 93306 TTE W/DOPPLER COMPLETE: CPT

## 2023-06-11 RX ORDER — LACTULOSE 10 G/15ML
10 SOLUTION ORAL 3 TIMES DAILY
Status: DISCONTINUED | OUTPATIENT
Start: 2023-06-11 | End: 2023-06-14 | Stop reason: HOSPADM

## 2023-06-11 RX ORDER — BUMETANIDE 0.25 MG/ML
4 INJECTION INTRAMUSCULAR; INTRAVENOUS EVERY 12 HOURS
Status: DISCONTINUED | OUTPATIENT
Start: 2023-06-11 | End: 2023-06-12

## 2023-06-11 RX ORDER — INSULIN LISPRO 100 [IU]/ML
10 INJECTION, SOLUTION INTRAVENOUS; SUBCUTANEOUS ONCE
Status: COMPLETED | OUTPATIENT
Start: 2023-06-11 | End: 2023-06-11

## 2023-06-11 RX ADMIN — DOCUSATE SODIUM 50MG AND SENNOSIDES 8.6MG 2 TABLET: 8.6; 5 TABLET, FILM COATED ORAL at 08:39

## 2023-06-11 RX ADMIN — BUDESONIDE AND FORMOTEROL FUMARATE DIHYDRATE 2 PUFF: 160; 4.5 AEROSOL RESPIRATORY (INHALATION) at 07:55

## 2023-06-11 RX ADMIN — SODIUM ZIRCONIUM CYCLOSILICATE 10 G: 10 POWDER, FOR SUSPENSION ORAL at 08:39

## 2023-06-11 RX ADMIN — BUDESONIDE AND FORMOTEROL FUMARATE DIHYDRATE 2 PUFF: 160; 4.5 AEROSOL RESPIRATORY (INHALATION) at 20:54

## 2023-06-11 RX ADMIN — METHYLPREDNISOLONE SODIUM SUCCINATE 40 MG: 40 INJECTION, POWDER, FOR SOLUTION INTRAMUSCULAR; INTRAVENOUS at 05:56

## 2023-06-11 RX ADMIN — ATORVASTATIN CALCIUM 40 MG: 20 TABLET, FILM COATED ORAL at 21:38

## 2023-06-11 RX ADMIN — METHYLPREDNISOLONE SODIUM SUCCINATE 40 MG: 40 INJECTION, POWDER, FOR SOLUTION INTRAMUSCULAR; INTRAVENOUS at 21:38

## 2023-06-11 RX ADMIN — BUPROPION HYDROCHLORIDE 150 MG: 150 TABLET, EXTENDED RELEASE ORAL at 08:38

## 2023-06-11 RX ADMIN — IPRATROPIUM BROMIDE AND ALBUTEROL SULFATE 3 ML: .5; 3 SOLUTION RESPIRATORY (INHALATION) at 07:53

## 2023-06-11 RX ADMIN — AMLODIPINE BESYLATE 10 MG: 10 TABLET ORAL at 08:38

## 2023-06-11 RX ADMIN — LACTULOSE 10 G: 10 SOLUTION ORAL at 21:38

## 2023-06-11 RX ADMIN — INSULIN LISPRO 2 UNITS: 100 INJECTION, SOLUTION INTRAVENOUS; SUBCUTANEOUS at 18:10

## 2023-06-11 RX ADMIN — IPRATROPIUM BROMIDE AND ALBUTEROL SULFATE 3 ML: .5; 3 SOLUTION RESPIRATORY (INHALATION) at 20:48

## 2023-06-11 RX ADMIN — INSULIN LISPRO 2 UNITS: 100 INJECTION, SOLUTION INTRAVENOUS; SUBCUTANEOUS at 08:39

## 2023-06-11 RX ADMIN — HYDROCODONE BITARTRATE AND ACETAMINOPHEN 1 TABLET: 10; 325 TABLET ORAL at 05:56

## 2023-06-11 RX ADMIN — INSULIN LISPRO 2 UNITS: 100 INJECTION, SOLUTION INTRAVENOUS; SUBCUTANEOUS at 21:37

## 2023-06-11 RX ADMIN — INSULIN LISPRO 4 UNITS: 100 INJECTION, SOLUTION INTRAVENOUS; SUBCUTANEOUS at 13:11

## 2023-06-11 RX ADMIN — GABAPENTIN 300 MG: 300 CAPSULE ORAL at 08:38

## 2023-06-11 RX ADMIN — METHYLPREDNISOLONE SODIUM SUCCINATE 40 MG: 40 INJECTION, POWDER, FOR SOLUTION INTRAMUSCULAR; INTRAVENOUS at 13:10

## 2023-06-11 RX ADMIN — HYDROCODONE BITARTRATE AND ACETAMINOPHEN 1 TABLET: 10; 325 TABLET ORAL at 21:38

## 2023-06-11 RX ADMIN — BUMETANIDE 4 MG: 0.25 INJECTION INTRAMUSCULAR; INTRAVENOUS at 15:51

## 2023-06-11 RX ADMIN — LEVOTHYROXINE SODIUM 25 MCG: 0.03 TABLET ORAL at 05:56

## 2023-06-11 RX ADMIN — DOCUSATE SODIUM 50MG AND SENNOSIDES 8.6MG 2 TABLET: 8.6; 5 TABLET, FILM COATED ORAL at 21:38

## 2023-06-11 RX ADMIN — DEXTROSE MONOHYDRATE 25 G: 25 INJECTION, SOLUTION INTRAVENOUS at 10:54

## 2023-06-11 RX ADMIN — INSULIN LISPRO 10 UNITS: 100 INJECTION, SOLUTION INTRAVENOUS; SUBCUTANEOUS at 10:55

## 2023-06-11 NOTE — PLAN OF CARE
Goal Outcome Evaluation:      Patient A/O x4. Pleasant and cooperative. Up with assist x1, and walker.Family at bedside attentive to patient. Requiring 3L NC to maintain O2 sats> 90%. Post void residual measured x2 with 0 ml noted. VSS. Safety maintained.   Daily Care Plan Summary: Heart Failure    Diuretic in use (IV or PO):   IV Bumex        Daily weight (up or down):    JOCELYN      Output > Intake (yes/no): Yes      O2 Requirements (current, any change?): 3L NC      Symptoms noted with Activity (Respiratory Tolerance, functional state): SOA reported    Anticipated Discharge Plans:  Home

## 2023-06-11 NOTE — DISCHARGE PLACEMENT REQUEST
"Heidy Cee (78 y.o. Male)       Date of Birth   1945    Social Security Number       Address   [address] [address]    Home Phone   [phone]    MRN   [id number]       Yazdanism   None    Marital Status   Single                            Admission Date   6/9/23    Admission Type   Emergency    Admitting Provider   Anna Nolasco MD    Attending Provider   Antony Pastor MD    Department, Room/Bed   [address], [address]       Discharge Date       Discharge Disposition       Discharge Destination                                 Attending Provider: Antony Pastor MD    Allergies: Penicillins    Isolation: None   Infection: COVID (rule out) (06/09/23)   Code Status: CPR    Ht: 188 cm (74.02\")   Wt: 174 kg (383 lb 9.6 oz)    Admission Cmt: None   Principal Problem: Acute on chronic congestive heart failure, unspecified heart failure type [I50.9]                   Active Insurance as of 6/9/2023       Primary Coverage       Payor Plan Insurance Group Employer/Plan Group    [facility] MEDICARE REPLACEMENT [facility] DUAL COMPLETE MEDICARE REPLACEMENT [id number]       Payor Plan Address Payor Plan Phone Number Payor Plan Fax Number Effective Dates    [address] [phone]  1/1/2023 - None Entered    [facility] [address]         Subscriber Name Subscriber Birth Date Member ID       HEIDY CEE 1945 [id number]                     Emergency Contacts        (Rel.) Home Phone Work Phone Mobile Phone    Emily Barrett (Relative) -- -- [phone]    CeeVeronica ball (Daughter) -- -- [phone]                "

## 2023-06-11 NOTE — PROGRESS NOTES
Name: Brandon Alan ADMIT: 2023   : 1945  PCP: Wong Cuellar MD    MRN: [id number] LOS: 1 days   AGE/SEX: 78 y.o. male  ROOM: [facility]/     Subjective   Subjective     He reports breathing is about the same as yesterday.  Still feels orthopneic.  States HAILE about the same.   He has a history of obstructive sleep apnea but is currently not on PAP therapy due to financial issues.  He was started on nasal cannula O2 at night at 2 L to assist with his nocturnal hypoxemia about 6 weeks ago.  He states he drinks large amount of water at home, Over several liters a day.  Does not have a scale at home. Reports constipation.  Last BM he thinks Thursday.      Review of systems:  General: Denies fever or chills.  Appetite good  Cardiovascular: Denies chest pain.  Positive orthopnea  Respiratory: Denies cough, + HAILE  GI: Denies nausea or vomiting or abdominal pain     Objective   Objective   Vital Signs  Temp:  [97.4 °F (36.3 °C)-98.6 °F (37 °C)] 98.4 °F (36.9 °C)  Heart Rate:  [72-82] 82  Resp:  [18-20] 18  BP: ()/(57-70) 117/70  SpO2:  [90 %-95 %] 95 %  on  Flow (L/min):  [2-3] 3;   Device (Oxygen Therapy): nasal cannula  Body mass index is 49.23 kg/m².    Physical Exam  Vitals and nursing note reviewed.   Constitutional:       General: He is not in acute distress.     Appearance: He is obese. He is ill-appearing (Chronically ill-appearing).   Eyes:      General: No scleral icterus.     Conjunctiva/sclera: Conjunctivae normal.   Cardiovascular:      Rate and Rhythm: Normal rate and regular rhythm.      Pulses: Normal pulses.      Heart sounds: Normal heart sounds. No murmur heard.  Pulmonary:      Effort: No respiratory distress.      Breath sounds: Normal breath sounds. No wheezing.      Comments: Less conversational  dyspnea.   Abdominal:      General: Bowel sounds are normal. There is distension.      Tenderness: There is no abdominal tenderness. There is no guarding.      Comments: Obese    Musculoskeletal:      Right lower leg: Edema present.      Left lower leg: Edema present.      Comments: 2+ edema up just past his knees   Skin:     General: Skin is warm and dry.      Capillary Refill: Capillary refill takes less than 2 seconds.   Neurological:      General: No focal deficit present.      Mental Status: He is alert and oriented to person, place, and time. Mental status is at baseline.   Psychiatric:         Mood and Affect: Mood normal.         Behavior: Behavior normal.         Thought Content: Thought content normal.         Judgment: Judgment normal.       Intake/Output Summary (Last 24 hours) at 6/11/2023 1517  Last data filed at 6/11/2023 1030  Gross per 24 hour   Intake 570 ml   Output 1750 ml   Net -1180 ml     Echo obtained.  Poor quality windows.  Unable to determine EF.  Pt declined definity    nterpretation Summary echo 6/1123         The study is technically suboptimal for diagnosis.    Left ventricle not well visualized. Segmental wall motion cannot be commented on as a left ventricle is not well visualized. Ejection fraction cannot be determined. Patient did not want Definity   .         Results Review  I reviewed the patient's new clinical results.  Results from last 7 days   Lab Units 06/10/23  0439 06/09/23  1431   WBC 10*3/mm3 2.89* 3.46   HEMOGLOBIN g/dL 12.0* 12.0*   PLATELETS 10*3/mm3 98* 85*       Results from last 7 days   Lab Units 06/11/23  1306 06/11/23  0432 06/10/23  0631 06/10/23  0439 06/09/23  1431   SODIUM mmol/L 138 134*  --  131* 138   POTASSIUM mmol/L 5.7* 6.2* 6.2* 6.4* 4.9   CHLORIDE mmol/L 102 101  --  98 104   CO2 mmol/L 26.3 26.0  --  24.1 26.2   BUN mg/dL 55* 60*  --  45* 33*   CREATININE mg/dL 1.82* 2.05*  --  1.88* 1.39*   GLUCOSE mg/dL 277* 270*  --  241* 96       Lab Results   Component Value Date    ANIONGAP 9.7 06/11/2023     Estimated Creatinine Clearance: 57.2 mL/min (A) (by C-G formula based on SCr of 1.82 mg/dL (H)).    Results from last 7 days    Lab Units 06/11/23  0432 06/09/23  1431   ALBUMIN g/dL 3.5 2.9*   BILIRUBIN mg/dL 0.3 0.5   ALK PHOS U/L 101 74   AST (SGOT) U/L 24 33   ALT (SGPT) U/L 19 22       Results from last 7 days   Lab Units 06/11/23  1306 06/11/23  0432 06/10/23  0439 06/09/23  1431   CALCIUM mg/dL 8.8 7.8* 8.0* 8.4*   ALBUMIN g/dL  --  3.5  --  2.9*         Hemoglobin A1C   Date/Time Value Ref Range Status   06/11/2023 0432 5.50 4.80 - 5.60 % Final     Glucose   Date/Time Value Ref Range Status   06/11/2023 1246 256 (H) 70 - 130 mg/dL Final     Comment:     Meter: [id number] : [id number] Kaushik Staley NA   06/11/2023 0731 196 (H) 70 - 130 mg/dL Final     Comment:     Meter: [id number] : [id number] Kaushik Rebeka NA   06/10/2023 2029 146 (H) 70 - 130 mg/dL Final     Comment:     Meter: [id number] : [id number] SandyvilleShruthi Garcia NA   06/10/2023 1716 114 70 - 130 mg/dL Final     Comment:     Meter: [id number] : [id number] Kaushik Rebeka NA   06/10/2023 1243 226 (H) 70 - 130 mg/dL Final     Comment:     Meter: [id number] : [id number] Adriana Fernandeze NA   06/10/2023 1136 240 (H) 70 - 130 mg/dL Final     Comment:     Meter: [id number] : [id number] Kaushik Rebeka NA   06/09/2023 1728 68 (L) 70 - 130 mg/dL Final     Comment:     Meter: [id number] : [id number] Fiorella MENDOZA       No radiology results for the last day      Current Facility-Administered Medications:     acetaminophen (TYLENOL) tablet 650 mg, 650 mg, Oral, Q4H PRN, Stingl, Anna Kaminski MD, 650 mg at 06/10/23 2039    albuterol (PROVENTIL) nebulizer solution 0.083% 2.5 mg/3mL, 2.5 mg, Nebulization, Q6H PRN, Anna Nolasco MD    amLODIPine (NORVASC) tablet 10 mg, 10 mg, Oral, Q24H, Anna Nolasco MD, 10 mg at 06/11/23 0838    atorvastatin (LIPITOR) tablet 40 mg, 40 mg, Oral, Nightly, Anna Nolasco MD, 40 mg at 06/10/23 2015    sennosides-docusate (PERICOLACE) 8.6-50 MG per tablet 2 tablet, 2 tablet, Oral, BID, 2  tablet at 06/11/23 0839 **AND** polyethylene glycol (MIRALAX) packet 17 g, 17 g, Oral, Daily PRN **AND** bisacodyl (DULCOLAX) EC tablet 5 mg, 5 mg, Oral, Daily PRN **AND** bisacodyl (DULCOLAX) suppository 10 mg, 10 mg, Rectal, Daily PRN, Anna Nolasco MD    budesonide-formoterol (SYMBICORT) 160-4.5 MCG/ACT inhaler 2 puff, 2 puff, Inhalation, BID - RT, Soo Jaramillo APRN, 2 puff at 06/11/23 0755    bumetanide (BUMEX) injection 4 mg, 4 mg, Intravenous, Q12H, Nathaly Ayala MD    buPROPion SR (WELLBUTRIN SR) 12 hr tablet 150 mg, 150 mg, Oral, Daily, Anna Nolasco MD, 150 mg at 06/11/23 0838    dextrose (D50W) (25 g/50 mL) IV injection 25 g, 25 g, Intravenous, Q15 Min PRN, Soo Jaramillo APRN, 25 g at 06/11/23 1054    dextrose (GLUTOSE) oral gel 15 g, 15 g, Oral, Q15 Min PRN, Soo Jaramillo APRN    glucagon (GLUCAGEN) injection 1 mg, 1 mg, Intramuscular, Q15 Min PRN, Soo Jaramillo APRN    HYDROcodone-acetaminophen (NORCO)  MG per tablet 1 tablet, 1 tablet, Oral, Q6H PRN, Anna Nolasco MD, 1 tablet at 06/11/23 0556    insulin lispro (HUMALOG/ADMELOG) injection 2-7 Units, 2-7 Units, Subcutaneous, 4x Daily AC & at Bedtime, Soo Jaramillo APRN, 4 Units at 06/11/23 1311    ipratropium-albuterol (DUO-NEB) nebulizer solution 3 mL, 3 mL, Nebulization, Q6H While Awake - RT, Anna Nolasco MD, 3 mL at 06/11/23 0753    levothyroxine (SYNTHROID, LEVOTHROID) tablet 25 mcg, 25 mcg, Oral, Q AM, Anna Nolasco MD, 25 mcg at 06/11/23 0556    melatonin tablet 3 mg, 3 mg, Oral, Nightly PRN, Anna Nolasco MD    methylPREDNISolone sodium succinate (SOLU-Medrol) injection 40 mg, 40 mg, Intravenous, Q8H, Anna Nolasco MD, 40 mg at 06/11/23 1310    nitroglycerin (NITROSTAT) SL tablet 0.4 mg, 0.4 mg, Sublingual, Q5 Min PRN, Soo Jaramillo APRN    ondansetron (ZOFRAN) tablet 4 mg, 4 mg, Oral, Q6H PRN **OR** ondansetron (ZOFRAN) injection 4 mg, 4 mg,  Intravenous, Q6H PRN, Anna Nolasco MD    sodium chloride 0.9 % flush 10 mL, 10 mL, Intravenous, PRN, Medardo Puente MD       Diet  Diet: Cardiac Diets, Renal Diets; Healthy Heart (2-3 Na+); Low Potassium; Texture: Regular Texture (IDDSI 7); Fluid Consistency: Thin (IDDSI 0)    Estimated Creatinine Clearance: 57.2 mL/min (A) (by C-G formula based on SCr of 1.82 mg/dL (H)).      Radiology Reports  [facility]         Results -  Exam Date Time Procedure Performing Provider Status   5/2/23 1:02 PM CT Chest WO RAY, JEY CT Tech; Auth (Verified)   Notes:   (CT Chest WO) Reason For Exam: Other disorder of lung;Other disorder of lung   REPORT  EXAM: CT Chest WO    NUMBER OF IMAGES: [id number]    Examination: CT of the chest without intravenous contrast    Clinical statement: 77 years old Male with chronic shortness of breath 4 2-3 years    Technique: CT of the chest was performed from the apices of the lungs through the upper abdomen using contiguous 5 mm transaxial sections, following standard protocol. Reformations were constructed in the coronal and sagittal planes (5 mm). Images were reviewed in soft tissue, bone and lung windows.    Images were reconstructed, using maximum intensity projection (MIP) and volume rendered (3D) datasets.    Low-dose CT acquisition technique included one of following options:  1. Automated exposure control  2. Adjustment of MA and or KV according to patient's size or  3. Use of iterative reconstruction.    Comparison: No prior studies available for comparison.    Findings:  Lung parenchyma and pleura: The tracheobronchial tree is patent. There is a 2 mm soft tissue nodule in the right lung, along the fissure margin (series 2, image 40). There are small bilateral pleural effusions with associated compressive atelectasis. There are multiple radiopaque foreign bodies of metallic density embedded in the left lung, consistent with a history of penetrating trauma. There is no pneumothorax.  There is mild pleural-parenchymal thickening/scarring throughout the lungs bilaterally, right more so than left.    Thyroid: Grossly unremarkable.    Mediastinum: There are scattered prominent/enlarged mediastinal lymph nodes, with index lymph node located in the right paratracheal region, measuring 2.1 cm x 1.1 cm in size (series 2, image 21).    Chayo: There are subcentimeter calcified right hilar lymph nodes, consistent with a history of granulomatous disease. However, the chayo are suboptimally evaluated due to lack of intravenous contrast.    Heart and pericardium: The heart is enlarged. There is no pericardial effusion. There are mild coronary artery calcifications. There is dilatation of the right pulmonary artery, measuring 3.3 cm in width (series 2, image 28). There is dilatation of the left pulmonary artery, measuring 3.1 cm in width (series 2, image 26). There is dilatation of the main pulmonary artery, measuring 3.5 cm in maximum diameter (series 2, image 27).    Chest wall: There are multiple radiopaque foreign bodies metallic density embedded in the left chest wall, consistent with a history of penetrating trauma.    Axilla: No enlarged lymph nodes.    Visualized upper abdomen: The liver has a cirrhotic morphology. The remaining visualized upper abdominal organs are grossly unremarkable.    Bones: There are degenerative changes of the spine. There are chronic fracture deformities involving multiple left ribs.       Estimated Creatinine Clearance: 57.2 mL/min (A) (by C-G formula based on SCr of 1.82 mg/dL (H)).    Assessment/Plan     Active Hospital Problems    Diagnosis  POA    **Acute on chronic congestive heart failure, unspecified heart failure type [I50.9]  Yes    YOAN and COPD overlap syndrome [G47.33, J44.9]  Unknown    CKD (chronic kidney disease) stage 3, GFR 30-59 ml/min [N18.30]  Unknown    Hyperkalemia [E87.5]  Unknown    Obesity [E66.9]  Yes    DM type 2 (diabetes mellitus, type 2) [E11.9]   Yes      Resolved Hospital Problems   No resolved problems to display.     78 y.o. male     Acute on chronic HF  - proBNP 643  - xray chest showing Cardiomegaly with pulmonary vascular congestion and pleural effusions.  [facility] CT imaging done back in May did show pleural effusion with compression atelectasis at that time  - appreciate nephrology seeing.  They have changed lasix to bumex.    -Unable to access [facility] echocardiograms [facility].  Noted most recent 1 / 2023.  From what I gathered per his take on results he was told that he had enlarged blood vessels noted on echo which I suspect indicated pulmonary htn.   - Check transthoracic Echo ordered but he did have good images d/t his body habitus.  Echo tech recommended by protocol to use definity and pt refussed.  Discussed importance of obtaining cardiac imaging.  He said he would consider reattempt.   - TSH WNL.   - will ask cardiology to see.   - discussed importance of daily weights    Hyperkalemia/ ELBERT on CKD3  - K+ 4.9 on admit up to 6.4 with recheck 6.2 this am. Was given lokelma with repeat lab 5.7.   - nephrology consult placed 6/10.  - reconsulted 6/11  - nephrology has changed to bumex and ordered UA and renal US.  He was taking up to 800 mg ibuprofen as outpt several times a week.  He has been counseled not to use NSAIDs in the future and to use tylenol for pain.    - post void residual 10 ml.    -He is on p.o. potassium as an outpatient he states that he does not always take it as prescribed.  Does consume multiple high potassium foods at home such as avocados,  bananas, apricots  - EKG showing tall peaked T wave likely from elevated K+.   HS Troponin T elevated at 20 with delta - 1 in setting of CKD.  No angina.    DM type 2 with peripheral neuropathy  - hold metformin   - Check A1c  -Noted hypoglycemia last night 68 with repeat glucose this am 241  - FSBS ordered.  SSI low dose coverage.    - hypoglycemia treatment per hospital protocol.    -He is on high-dose gabapentin 900 mg 3 times daily.  He does see a pain management doctor Dr Glenroy Nichols who manages this and his Norco.    HTN  -Is on amlodipine and atorvastatin combo as outpatient  -His amlodipine is likely contributing some to his peripheral edema but he does report he does wear support hose as an outpatient.  Continue for now as this is kidney friendly.     Chronic obstructive pulmonary disease / dyspnea on exertion/ right lung pulmonary nodule/ pulmonary HTN/acute hypoxic respiratory failure/ YOAN  -Based on his reports it appears that he has undergone significant pulmonary work-up to include pulmonary function testing as well as CT imaging.  Based on care everywhere records it appears that he is followed by [facility] pulmonologist Dr. Jeronimo.   - noted on this office visit on 4/14/2023 he did not tolerate LABA which caused issues with blurred vision, dry mouth.  And issues with renal incontinence.  He was then started on ProAir Respiclick and was changed to Symbicort 160/4.5.  Symbicort and nebs have been ordered.   [facility] did show small bilateral pleural effusions with associated compression atelectasis, cardiomegaly with dilation of the pulmonary vasculature consistent with pulmonary artery hypertension, mediastinal lymphadenopathy, right pulmonary nodule ([facility] radiology recommended f/u with Fleishner criteria) and hepatic cirrhosis.  - continues to feel shoa despite diuresing.   With hx of what sounds like pulmonary htn and continue hypoxia Will ask pulmonary to see in am.   -  RVP negative.   - hx of yoan but has not been able to afford PAP.  Is on nocturnal 02.     Hypothyroidism:   - TSH normal.   -Continue current home dose of levothyroxine    Constipation  - adjust bowel regimin  - abd exam benign, no nausea or vomiting.  + flatus.     Pancytopenia:   - plt count is trending up 98,000  - hgb 12  - WBC 2.89 (3.46)  - repeat CBC with diff in am.         SCD's for DVT prophylaxis    Discussed with patient and nursing staff    Discharge: To be determined based on clinical course.        MARGUERITE Barba  [facility]

## 2023-06-11 NOTE — CONSULTS
[facility] Consult Note      Patient Name: Brandon Alan  : 1945  MRN: [id number]  Primary Care Physician:  Wong Cuellar MD  Referring Physician: Anna Nolasco MD  Date of admission: 2023    Subjective     Reason for Consult: Acute kidney injury    HPI:   Brandon Alan is a 78 y.o. male with past medical history of hypertension, diabetes mellitus type 2 and morbid obesity as well as history of COPD/restrictive lung disease who came into the hospital because of increasing shortness of air.  His creatinine at time of admission was 1.3 mg/dL and has worsened up to 2 mg/dL with a potassium of 6.2 today.  We were consulted to help with management of acute kidney injury and hyperkalemia.  Patient reported shortness of air on exertion.  He is laying down in bed and appears to be dyspneic by simple activities.  His family on bedside.  Denied prior history of kidney failure in the past.  He does use [facility] as an outpatient for arthritis.  Denies history of kidney stones or hematuria.    Review of Systems:   14 point review of systems is otherwise negative except for mentioned above on HPI    Personal History     Past Medical History:   Diagnosis Date    Depression     Diabetes mellitus     Hypertension        History reviewed. No pertinent surgical history.    Family History: family history is not on file.    Social History:  reports that he has never smoked. He does not have any smokeless tobacco history on file. He reports that he does not drink alcohol and does not use drugs.    Home Medications:  Prior to Admission medications    Medication Sig Start Date End Date Taking? Authorizing Provider   albuterol sulfate  (90 Base) MCG/ACT inhaler Inhale 2 puffs Every 4 (Four) Hours As Needed for Wheezing or Shortness of Air. 5/3/19  Yes Maribell Peterson APRN   amLODIPine-atorvastatin (CADUET) 10-40 MG per tablet Take 1 tablet by mouth Daily.   Yes Provider,  MD Mu   buPROPion SR (WELLBUTRIN SR) 150 MG 12 hr tablet Take 1 tablet by mouth Daily.   Yes ProviderMu MD   gabapentin (NEURONTIN) 300 MG capsule Take 1 capsule by mouth 3 (Three) Times a Day. 3/13/23  Yes Mu Garcia MD   HYDROcodone-acetaminophen (NORCO)  MG per tablet Take 1 tablet by mouth Every 6 (Six) Hours As Needed for Moderate Pain. 5/18/23  Yes Mu Garcia MD   levothyroxine (SYNTHROID, LEVOTHROID) 25 MCG tablet Take 1 tablet by mouth Daily. 4/11/23  Yes Mu Garcia MD   meloxicam (MOBIC) 15 MG tablet Take 1 tablet by mouth Daily.   Yes Mu Garcia MD   metFORMIN (GLUCOPHAGE) 850 MG tablet Take 1 tablet by mouth 2 (Two) Times a Day With Meals.   Yes ProviderMu MD       Allergies:  Allergies   Allergen Reactions    Penicillins        Objective     Vitals:   Temp:  [97.4 °F (36.3 °C)-98.6 °F (37 °C)] 98.4 °F (36.9 °C)  Heart Rate:  [70-77] 76  Resp:  [18-20] 18  BP: ()/(47-70) 117/70  Flow (L/min):  [2-3] 3    Intake/Output Summary (Last 24 hours) at 6/11/2023 1048  Last data filed at 6/11/2023 1030  Gross per 24 hour   Intake 910 ml   Output 1425 ml   Net -515 ml       Physical Exam:    General Appearance: alert, oriented x 3, no acute distress   Skin: warm and dry  HEENT: oral mucosa normal, nonicteric sclera  Neck: supple, no JVD  Lungs: CTA  Heart: RRR, normal S1 and S2  Abdomen: soft, nontender, nondistended  : no palpable bladder  Extremities: ++ edema, cyanosis or clubbing  Neuro: normal speech and mental status     Scheduled Meds:     amLODIPine, 10 mg, Oral, Q24H  atorvastatin, 40 mg, Oral, Nightly  budesonide-formoterol, 2 puff, Inhalation, BID - RT  buPROPion SR, 150 mg, Oral, Daily  insulin lispro, 2-7 Units, Subcutaneous, 4x Daily AC & at Bedtime  ipratropium-albuterol, 3 mL, Nebulization, Q6H While Awake - RT  levothyroxine, 25 mcg, Oral, Q AM  methylPREDNISolone sodium succinate, 40 mg, Intravenous,  Q8H  senna-docusate sodium, 2 tablet, Oral, BID      IV Meds:        Results Reviewed:   I have personally reviewed the results from the time of this admission to 6/11/2023 10:48 EDT     Lab Results   Component Value Date    GLUCOSE 270 (H) 06/11/2023    CALCIUM 7.8 (L) 06/11/2023     (L) 06/11/2023    K 6.2 (C) 06/11/2023    CO2 26.0 06/11/2023     06/11/2023    BUN 60 (H) 06/11/2023    CREATININE 2.05 (H) 06/11/2023    EGFRIFNONA 90 05/03/2019    BCR 29.3 (H) 06/11/2023    ANIONGAP 7.0 06/11/2023      Lab Results   Component Value Date    MG 2.0 03/28/2016    ALBUMIN 3.5 06/11/2023           Assessment / Plan     ASSESSMENT:    Acute kidney injury : Baseline creatinine is 0.9 mg/dL it was noted to be 1.3 mg/dL on admission and today is 2 mg/dL associated with hyperkalemia.  He might be related to NSAID use as an outpatient versus ATN due to hypotension.  Will obtain urinalysis and urine electrolytes.  We will check bladder scan postvoid residual.  We will treat his hyperkalemia medically and repeat lab to ensure improvement of his potassium.  Will adjust all medications for creatinine clearance less than 10 mm/min/m².  Please avoid Mobic and all other nephrotoxic medications.  Hyperkalemia due to ELBERT and exogenous potassium intake and probably NSAIDs he was.  Treat medically and repeat labs showed improvement of potassium  Probable acute on chronic congestive heart failure.  Pending echocardiogram.  Unable to access his last echocardiogram result.  We will start patient on Bumex 4 mg IV twice a day.  Morbid obesity  History of COPD:   Hypothyroidism    PLAN:    We will obtain [facility] urine electrolytes and renal ultrasound as well as bladder scan postvoid residual  Hold nephrotoxic medications including NSAIDs  Awaiting echocardiogram but in the meanwhile we will start patient on Bumex 4 mg IV twice a day  Medical treatment for hyperkalemia and repeat labs ensure improvement of potassium  Surveillance  labs    Thank you for involving us in the care of Brandon Alan.  Please feel free to call with any questions.    Nathaly Ayala MD  06/11/23  10:48 EDT    [facility]  [phone]

## 2023-06-11 NOTE — CASE MANAGEMENT/SOCIAL WORK
Continued Stay Note  [facility]     Patient Name: Brandon Alan  MRN: [id number]  Today's Date: 6/11/2023    Admit Date: 6/9/2023    Plan: DC home   Discharge Plan       Row Name 06/11/23 1447       Plan    Plan DC home    Plan Comments CCP spoke to pt, introduced self, role and reviewed facee sheet.  Pt lives in an apartment with 3 steps to enter.  He voices no problems with stairs.  He is chronically on O2 at 2L/nc at night.)doesn't know where his O2 came from)  He does not know the name of the DME company.  His daughter lives with him and the other daughter checks in on pt periodically.  Pts pharmacy is [facility] in [place] and he can afford the copays.  He gets food from the food bank.  He said he will call [employer] and try and get meals on wheels next week.  His plan is to return home, no needs identified.  Thanks, Wanda HAN                   Discharge Codes    No documentation.                       Wanda Alexander

## 2023-06-12 LAB
ANION GAP SERPL CALCULATED.3IONS-SCNC: 8.9 MMOL/L (ref 5–15)
BASOPHILS # BLD AUTO: 0 10*3/MM3 (ref 0–0.2)
BASOPHILS NFR BLD AUTO: 0 % (ref 0–1.5)
BUN SERPL-MCNC: 43 MG/DL (ref 8–23)
BUN/CREAT SERPL: 35.5 (ref 7–25)
CALCIUM SPEC-SCNC: 9 MG/DL (ref 8.6–10.5)
CHLORIDE SERPL-SCNC: 96 MMOL/L (ref 98–107)
CO2 SERPL-SCNC: 32.1 MMOL/L (ref 22–29)
CREAT SERPL-MCNC: 1.21 MG/DL (ref 0.76–1.27)
DEPRECATED RDW RBC AUTO: 46.3 FL (ref 37–54)
EGFRCR SERPLBLD CKD-EPI 2021: 61.7 ML/MIN/1.73
EOSINOPHIL # BLD AUTO: 0 10*3/MM3 (ref 0–0.4)
EOSINOPHIL NFR BLD AUTO: 0 % (ref 0.3–6.2)
ERYTHROCYTE [DISTWIDTH] IN BLOOD BY AUTOMATED COUNT: 13.5 % (ref 12.3–15.4)
GLUCOSE BLDC GLUCOMTR-MCNC: 181 MG/DL (ref 70–130)
GLUCOSE BLDC GLUCOMTR-MCNC: 195 MG/DL (ref 70–130)
GLUCOSE BLDC GLUCOMTR-MCNC: 232 MG/DL (ref 70–130)
GLUCOSE BLDC GLUCOMTR-MCNC: 244 MG/DL (ref 70–130)
GLUCOSE SERPL-MCNC: 239 MG/DL (ref 65–99)
HCT VFR BLD AUTO: 38.4 % (ref 37.5–51)
HGB BLD-MCNC: 12.4 G/DL (ref 13–17.7)
IMM GRANULOCYTES # BLD AUTO: 0.01 10*3/MM3 (ref 0–0.05)
IMM GRANULOCYTES NFR BLD AUTO: 0.3 % (ref 0–0.5)
LYMPHOCYTES # BLD AUTO: 0.27 10*3/MM3 (ref 0.7–3.1)
LYMPHOCYTES NFR BLD AUTO: 7.1 % (ref 19.6–45.3)
MCH RBC QN AUTO: 30.2 PG (ref 26.6–33)
MCHC RBC AUTO-ENTMCNC: 32.3 G/DL (ref 31.5–35.7)
MCV RBC AUTO: 93.7 FL (ref 79–97)
MONOCYTES # BLD AUTO: 0.16 10*3/MM3 (ref 0.1–0.9)
MONOCYTES NFR BLD AUTO: 4.2 % (ref 5–12)
NEUTROPHILS NFR BLD AUTO: 3.37 10*3/MM3 (ref 1.7–7)
NEUTROPHILS NFR BLD AUTO: 88.4 % (ref 42.7–76)
NRBC BLD AUTO-RTO: 0 /100 WBC (ref 0–0.2)
PLATELET # BLD AUTO: 117 10*3/MM3 (ref 140–450)
PMV BLD AUTO: 12.4 FL (ref 6–12)
POTASSIUM SERPL-SCNC: 5.1 MMOL/L (ref 3.5–5.2)
RBC # BLD AUTO: 4.1 10*6/MM3 (ref 4.14–5.8)
SODIUM SERPL-SCNC: 137 MMOL/L (ref 136–145)
WBC NRBC COR # BLD: 3.81 10*3/MM3 (ref 3.4–10.8)

## 2023-06-12 PROCEDURE — 94761 N-INVAS EAR/PLS OXIMETRY MLT: CPT

## 2023-06-12 PROCEDURE — 94799 UNLISTED PULMONARY SVC/PX: CPT

## 2023-06-12 PROCEDURE — 63710000001 INSULIN LISPRO (HUMAN) PER 5 UNITS: Performed by: NURSE PRACTITIONER

## 2023-06-12 PROCEDURE — 80048 BASIC METABOLIC PNL TOTAL CA: CPT | Performed by: NURSE PRACTITIONER

## 2023-06-12 PROCEDURE — 25010000002 METHYLPREDNISOLONE PER 40 MG: Performed by: INTERNAL MEDICINE

## 2023-06-12 PROCEDURE — 94664 DEMO&/EVAL PT USE INHALER: CPT

## 2023-06-12 PROCEDURE — 82948 REAGENT STRIP/BLOOD GLUCOSE: CPT

## 2023-06-12 PROCEDURE — 85025 COMPLETE CBC W/AUTO DIFF WBC: CPT | Performed by: NURSE PRACTITIONER

## 2023-06-12 RX ORDER — BUMETANIDE 0.25 MG/ML
1 INJECTION INTRAMUSCULAR; INTRAVENOUS EVERY 12 HOURS
Status: DISCONTINUED | OUTPATIENT
Start: 2023-06-13 | End: 2023-06-13

## 2023-06-12 RX ADMIN — IPRATROPIUM BROMIDE AND ALBUTEROL SULFATE 3 ML: .5; 3 SOLUTION RESPIRATORY (INHALATION) at 12:13

## 2023-06-12 RX ADMIN — METHYLPREDNISOLONE SODIUM SUCCINATE 40 MG: 40 INJECTION, POWDER, FOR SOLUTION INTRAMUSCULAR; INTRAVENOUS at 09:16

## 2023-06-12 RX ADMIN — ATORVASTATIN CALCIUM 40 MG: 20 TABLET, FILM COATED ORAL at 21:43

## 2023-06-12 RX ADMIN — BUMETANIDE 4 MG: 0.25 INJECTION INTRAMUSCULAR; INTRAVENOUS at 12:53

## 2023-06-12 RX ADMIN — INSULIN LISPRO 2 UNITS: 100 INJECTION, SOLUTION INTRAVENOUS; SUBCUTANEOUS at 12:53

## 2023-06-12 RX ADMIN — HYDROCODONE BITARTRATE AND ACETAMINOPHEN 1 TABLET: 10; 325 TABLET ORAL at 06:37

## 2023-06-12 RX ADMIN — LACTULOSE 10 G: 10 SOLUTION ORAL at 09:04

## 2023-06-12 RX ADMIN — LACTULOSE 10 G: 10 SOLUTION ORAL at 15:19

## 2023-06-12 RX ADMIN — DOCUSATE SODIUM 50MG AND SENNOSIDES 8.6MG 2 TABLET: 8.6; 5 TABLET, FILM COATED ORAL at 21:43

## 2023-06-12 RX ADMIN — METHYLPREDNISOLONE SODIUM SUCCINATE 40 MG: 40 INJECTION, POWDER, FOR SOLUTION INTRAMUSCULAR; INTRAVENOUS at 21:43

## 2023-06-12 RX ADMIN — LEVOTHYROXINE SODIUM 25 MCG: 0.03 TABLET ORAL at 06:34

## 2023-06-12 RX ADMIN — HYDROCODONE BITARTRATE AND ACETAMINOPHEN 1 TABLET: 10; 325 TABLET ORAL at 20:05

## 2023-06-12 RX ADMIN — LACTULOSE 10 G: 10 SOLUTION ORAL at 21:43

## 2023-06-12 RX ADMIN — INSULIN LISPRO 3 UNITS: 100 INJECTION, SOLUTION INTRAVENOUS; SUBCUTANEOUS at 19:05

## 2023-06-12 RX ADMIN — BUMETANIDE 4 MG: 0.25 INJECTION INTRAMUSCULAR; INTRAVENOUS at 04:17

## 2023-06-12 RX ADMIN — METHYLPREDNISOLONE SODIUM SUCCINATE 40 MG: 40 INJECTION, POWDER, FOR SOLUTION INTRAMUSCULAR; INTRAVENOUS at 15:19

## 2023-06-12 RX ADMIN — BUDESONIDE AND FORMOTEROL FUMARATE DIHYDRATE 2 PUFF: 160; 4.5 AEROSOL RESPIRATORY (INHALATION) at 19:22

## 2023-06-12 RX ADMIN — DOCUSATE SODIUM 50MG AND SENNOSIDES 8.6MG 2 TABLET: 8.6; 5 TABLET, FILM COATED ORAL at 09:03

## 2023-06-12 RX ADMIN — INSULIN LISPRO 2 UNITS: 100 INJECTION, SOLUTION INTRAVENOUS; SUBCUTANEOUS at 09:03

## 2023-06-12 RX ADMIN — AMLODIPINE BESYLATE 10 MG: 10 TABLET ORAL at 09:03

## 2023-06-12 RX ADMIN — IPRATROPIUM BROMIDE AND ALBUTEROL SULFATE 3 ML: .5; 3 SOLUTION RESPIRATORY (INHALATION) at 06:57

## 2023-06-12 RX ADMIN — IPRATROPIUM BROMIDE AND ALBUTEROL SULFATE 3 ML: .5; 3 SOLUTION RESPIRATORY (INHALATION) at 19:20

## 2023-06-12 RX ADMIN — BUDESONIDE AND FORMOTEROL FUMARATE DIHYDRATE 2 PUFF: 160; 4.5 AEROSOL RESPIRATORY (INHALATION) at 07:03

## 2023-06-12 RX ADMIN — INSULIN LISPRO 3 UNITS: 100 INJECTION, SOLUTION INTRAVENOUS; SUBCUTANEOUS at 21:44

## 2023-06-12 NOTE — CASE MANAGEMENT/SOCIAL WORK
Continued Stay Note  [facility]     Patient Name: Brandon Alan  MRN: [id number]  Today's Date: 6/12/2023    Admit Date: 6/9/2023    Plan: Home, daughter will transport   Discharge Plan       Row Name 06/12/23 1239       Plan    Plan Home, daughter will transport    Plan Comments Met briefly with pt at bedside. Introduced self and explained role of .Pt denies any difficulty paying for medications, and obtains his medications from [facility] in [place]. Pt could  not remember name of o2 company. Pt stated that he did not need any further needs. Upon discharge, his daughter will transport home. Explained that CCP will folle to assess for discharge needs.                   Discharge Codes    No documentation.                 Expected Discharge Date and Time       Expected Discharge Date Expected Discharge Time    Jun 14, 2023               Bety Cunningham RN

## 2023-06-12 NOTE — PROGRESS NOTES
[facility] Progress Note      Patient Name: Brandon Alan  : 1945  MRN: [id number]  Primary Care Physician:  Wong Cuellar MD  Date of admission: 2023    Subjective     Interval History:   Breathing is comfortable on room air  Legs are less puffy  UOP yesterday 6.9 L on IV Bumex 4 mg q12h    Review of Systems:   As noted above    Objective     Vitals:   Temp:  [97.7 °F (36.5 °C)-98.4 °F (36.9 °C)] 98.3 °F (36.8 °C)  Heart Rate:  [81-95] 95  Resp:  [18] 18  BP: (126-158)/(70-78) 158/78  Flow (L/min):  [2-3] 2    Intake/Output Summary (Last 24 hours) at 2023 1749  Last data filed at 2023 1706  Gross per 24 hour   Intake 1880 ml   Output 7900 ml   Net -6020 ml       Physical Exam:    General: alert, oriented x 3, NAD, morbidly obese  Skin: warm and dry  HEENT: oral mucosa normal, nonicteric sclera  Neck: supple, no JVD  Lungs: Crackles in bases; not labored on room air  Heart: RRR, 2/6M, no rub  Abdomen: soft, nontender, nondistended  : no palpable bladder  Extremities: +1 edema both legs; venous stasis changes; malodorous  Neuro: normal speech and mental status     Scheduled Meds:     amLODIPine, 10 mg, Oral, Q24H  atorvastatin, 40 mg, Oral, Nightly  budesonide-formoterol, 2 puff, Inhalation, BID - RT  bumetanide, 4 mg, Intravenous, Q12H  buPROPion SR, 150 mg, Oral, Daily  insulin lispro, 2-7 Units, Subcutaneous, 4x Daily AC & at Bedtime  ipratropium-albuterol, 3 mL, Nebulization, Q6H While Awake - RT  lactulose, 10 g, Oral, TID  levothyroxine, 25 mcg, Oral, Q AM  methylPREDNISolone sodium succinate, 40 mg, Intravenous, Q8H  senna-docusate sodium, 2 tablet, Oral, BID      IV Meds:        Results Reviewed:   I have personally reviewed the results from the time of this admission to 2023 17:49 EDT     Results from last 7 days   Lab Units 23  0703 23  1306 23  0432 06/10/23  0439 23  1431   SODIUM mmol/L 137 138 134*   < > 138    POTASSIUM mmol/L 5.1 5.7* 6.2*   < > 4.9   CHLORIDE mmol/L 96* 102 101   < > 104   CO2 mmol/L 32.1* 26.3 26.0   < > 26.2   BUN mg/dL 43* 55* 60*   < > 33*   CREATININE mg/dL 1.21 1.82* 2.05*   < > 1.39*   CALCIUM mg/dL 9.0 8.8 7.8*   < > 8.4*   BILIRUBIN mg/dL  --   --  0.3  --  0.5   ALK PHOS U/L  --   --  101  --  74   ALT (SGPT) U/L  --   --  19  --  22   AST (SGOT) U/L  --   --  24  --  33   GLUCOSE mg/dL 239* 277* 270*   < > 96    < > = values in this interval not displayed.       Estimated Creatinine Clearance: 84.6 mL/min (by C-G formula based on SCr of 1.21 mg/dL).                Results from last 7 days   Lab Units 06/12/23  0453 06/10/23  0439 06/09/23  1431   WBC 10*3/mm3 3.81 2.89* 3.46   HEMOGLOBIN g/dL 12.4* 12.0* 12.0*   PLATELETS 10*3/mm3 117* 98* 85*             Assessment / Plan     ASSESSMENT:  ELBERT, nonoliguric, improving:  prerenal due to CHF.  Stable electrolytes, with resolved hyperkalemia; responding well to diuretic  Acute on chronic CHF with mild heart murmur  Morbid obesity  Hypertension, exacerbated by volume overload  Anemia  Thrombocytopenia  COPD    PLAN:  Continue IV Bumex, but decrease dose to 1 mg BID  Add fluid restriction, 1.5 L/day  Surveillance labs    Thank you for involving us in the care of Brandon Alan.  Please feel free to call with any questions.    Chente Ahn MD  06/12/23  17:49 EDT    [facility]\""  [phone]    Please note that portions of this note were completed with a voice recognition program.

## 2023-06-12 NOTE — PLAN OF CARE
Goal Outcome Evaluation:          Diuretic in Use: IV BUMEX  Response to Diuretics (Output greater than intake): YES  Daily Weight (up or down): DOWN  O2 Requirements:3 L/M N/C   Functional Status (Activity level, tolerance and respiratory symptoms): SOA AT BEDSIDE  Discharge Plans:  TBD

## 2023-06-12 NOTE — CONSULTS
Date of Hospital Visit: 23  Encounter Provider: Serafin Swartz MD  Place of Service: [facility] CARDIOLOGY  Patient Name: Brandon Alan  :1945  Referral Provider: Anna Nolasco, *    Chief complaint  Shortness Of Breath    History of Present Illness:    Brandon Alan is a 79 yo with COPD on 2L of home O2, hypertension, YOAN (no CPAP) and diabetes who presented to the ED on  with increasing shortness of breath, orthopnea and swelling of his lower extremities.  Initial oxygen saturations were 92% on 2L.  He has become increasingly short of breath over several months and has seen pulmonology at [facility] where he was started on home oxygen and inhalers. Per review of records, he drinks several liters of water a day.  He was admitted to telemetry with hypoxic respiratory failure secondary to volume overload.    On arrival, patient had ELBERT with creatinine up to 2.05, now is down trended to 1.21.  High-sensitivity troponins negative x3.  Initial CXR showed cardiomegaly with pulmonary vascular congestion.       Nephrology is managing diuretics.  He is currently on 3L NC.      ECHO 23    The study is technically suboptimal for diagnosis.    Left ventricle not well visualized. Segmental wall motion cannot be commented on as a left ventricle is not well visualized. Ejection fraction cannot be determined. Patient did not want Definity      Past Medical History:   Diagnosis Date    Depression     Diabetes mellitus     Hypertension        History reviewed. No pertinent surgical history.    Medications Prior to Admission   Medication Sig Dispense Refill Last Dose    albuterol sulfate  (90 Base) MCG/ACT inhaler Inhale 2 puffs Every 4 (Four) Hours As Needed for Wheezing or Shortness of Air. 1 inhaler 0 2023    amLODIPine-atorvastatin (CADUET) 10-40 MG per tablet Take 1 tablet by mouth Daily.   2023    buPROPion SR (WELLBUTRIN SR) 150 MG 12 hr tablet Take 1 tablet  by mouth Daily.   6/9/2023    gabapentin (NEURONTIN) 300 MG capsule Take 1 capsule by mouth 3 (Three) Times a Day.   6/9/2023 at 0600    HYDROcodone-acetaminophen (NORCO)  MG per tablet Take 1 tablet by mouth Every 6 (Six) Hours As Needed for Moderate Pain.   6/8/2023    levothyroxine (SYNTHROID, LEVOTHROID) 25 MCG tablet Take 1 tablet by mouth Daily.   6/9/2023    meloxicam (MOBIC) 15 MG tablet Take 1 tablet by mouth Daily.   6/9/2023    metFORMIN (GLUCOPHAGE) 850 MG tablet Take 1 tablet by mouth 2 (Two) Times a Day With Meals.   6/9/2023 at 0700       Current Meds  Scheduled Meds:amLODIPine, 10 mg, Oral, Q24H  atorvastatin, 40 mg, Oral, Nightly  budesonide-formoterol, 2 puff, Inhalation, BID - RT  bumetanide, 4 mg, Intravenous, Q12H  buPROPion SR, 150 mg, Oral, Daily  insulin lispro, 2-7 Units, Subcutaneous, 4x Daily AC & at Bedtime  ipratropium-albuterol, 3 mL, Nebulization, Q6H While Awake - RT  lactulose, 10 g, Oral, TID  levothyroxine, 25 mcg, Oral, Q AM  methylPREDNISolone sodium succinate, 40 mg, Intravenous, Q8H  senna-docusate sodium, 2 tablet, Oral, BID      Continuous Infusions:   PRN Meds:.  acetaminophen    albuterol    senna-docusate sodium **AND** polyethylene glycol **AND** bisacodyl **AND** bisacodyl    dextrose    dextrose    glucagon (human recombinant)    HYDROcodone-acetaminophen    melatonin    nitroglycerin    ondansetron **OR** ondansetron    sodium chloride    Allergies as of 06/09/2023 - Reviewed 06/09/2023   Allergen Reaction Noted    Penicillins  03/27/2016       Social History     Socioeconomic History    Marital status: Single   Tobacco Use    Smoking status: Never   Vaping Use    Vaping Use: Never used   Substance and Sexual Activity    Alcohol use: No    Drug use: No    Sexual activity: Defer       History reviewed. No pertinent family history.    REVIEW OF SYSTEMS:   12 point ROS was performed and is negative except as outlined in HPI          Objective:   Temp:  [97.7 °F  "(36.5 °C)-98.6 °F (37 °C)] 98.4 °F (36.9 °C)  Heart Rate:  [81-95] 90  Resp:  [18] 18  BP: (117-148)/(70-74) 148/73  Body mass index is 48.02 kg/m².  Flowsheet Rows      Flowsheet Row First Filed Value   Admission Height 188 cm (74\") Documented at 06/09/2023 1734   Admission Weight 172 kg (380 lb 1.6 oz) Documented at 06/10/2023 0500          Vitals:    06/12/23 0708   BP: 148/73   Pulse: 90   Resp: 18   Temp: 98.4 °F (36.9 °C)   SpO2: 91%       GEN: no distress, alert and oriented  HEENT: NACT, EOMI, moist mucous membranes  Lungs: decreased bibasilar breath sounds  CV: normal rate, regular rhythm, normal S1, S2, 2/6 systolic murmur in RUSB, +2 radial pulses b/l  Abdomen: soft, nontender, nondistended, NABS  Extremities: 1-2+ edema b/l  Skin: no rash, warm, dry  Heme/Lymph: no bruising  Psych: organized thought, normal behavior and affect      Lab Review:   Results from last 7 days   Lab Units 06/12/23  0703 06/11/23  1306 06/11/23  0432   SODIUM mmol/L 137   < > 134*   POTASSIUM mmol/L 5.1   < > 6.2*   CHLORIDE mmol/L 96*   < > 101   CO2 mmol/L 32.1*   < > 26.0   BUN mg/dL 43*   < > 60*   CREATININE mg/dL 1.21   < > 2.05*   CALCIUM mg/dL 9.0   < > 7.8*   BILIRUBIN mg/dL  --   --  0.3   ALK PHOS U/L  --   --  101   ALT (SGPT) U/L  --   --  19   AST (SGOT) U/L  --   --  24   GLUCOSE mg/dL 239*   < > 270*    < > = values in this interval not displayed.     Results from last 7 days   Lab Units 06/10/23  0049 06/09/23  2253 06/09/23  1431   HSTROP T ng/L 20* 21* 21*     Results from last 7 days   Lab Units 06/12/23  0453 06/10/23  0439 06/09/23  1431   WBC 10*3/mm3 3.81 2.89* 3.46   HEMOGLOBIN g/dL 12.4* 12.0* 12.0*   HEMATOCRIT % 38.4 37.0* 36.1*   PLATELETS 10*3/mm3 117* 98* 85*             EKG 5-3-19      EKG 6-10-23          I personally viewed and interpreted the patient's EKG/Telemetry data)      Acute on chronic congestive heart failure, unspecified heart failure type    DM type 2 (diabetes mellitus, type 2)    " Obesity    YOAN and COPD overlap syndrome    CKD (chronic kidney disease) stage 3, GFR 30-59 ml/min    Hyperkalemia    Assessment and Plan:  #Volume overload  #Hypoxic respiratory failure  #Systolic murmur  #COPD  #Hypertension  #YOAN  #Diabetes    79 yo with COPD on 2L of home O2, hypertension, YOAN (no CPAP) and diabetes who presented to the ED on 6/9 with increasing shortness of breath, found to be volume overloaded on exam and with acute hypoxic respiratory failure     Echocardiogram was technically difficult and patient refused Definity.    Discussed importance of evaluating LV function with patient and he will think about agreeing to Definity.  He also has a systolic murmur which he notes is new.  Aortic valve was difficult to visualize and Doppler tracing does not appear accurate.    Retinae has significantly improved with diuresis.  He remains volume overloaded on exam.    - Continue diuretics per nephrology  - Patient will let us know once he agrees to Definity and we will repeat echocardiogram at that time for further evaluation of LV function    Serafin Swartz MD  06/12/23  08:13 EDT.

## 2023-06-13 LAB
ALBUMIN SERPL-MCNC: 3.4 G/DL (ref 3.5–5.2)
ANION GAP SERPL CALCULATED.3IONS-SCNC: 4.4 MMOL/L (ref 5–15)
BUN SERPL-MCNC: 29 MG/DL (ref 8–23)
BUN/CREAT SERPL: 27.4 (ref 7–25)
CALCIUM SPEC-SCNC: 8.9 MG/DL (ref 8.6–10.5)
CHLORIDE SERPL-SCNC: 95 MMOL/L (ref 98–107)
CO2 SERPL-SCNC: 36.6 MMOL/L (ref 22–29)
CREAT SERPL-MCNC: 1.06 MG/DL (ref 0.76–1.27)
DEPRECATED RDW RBC AUTO: 44 FL (ref 37–54)
EGFRCR SERPLBLD CKD-EPI 2021: 72.3 ML/MIN/1.73
ERYTHROCYTE [DISTWIDTH] IN BLOOD BY AUTOMATED COUNT: 13.2 % (ref 12.3–15.4)
GLUCOSE BLDC GLUCOMTR-MCNC: 134 MG/DL (ref 70–130)
GLUCOSE BLDC GLUCOMTR-MCNC: 139 MG/DL (ref 70–130)
GLUCOSE BLDC GLUCOMTR-MCNC: 169 MG/DL (ref 70–130)
GLUCOSE BLDC GLUCOMTR-MCNC: 205 MG/DL (ref 70–130)
GLUCOSE SERPL-MCNC: 202 MG/DL (ref 65–99)
HCT VFR BLD AUTO: 38.3 % (ref 37.5–51)
HGB BLD-MCNC: 12.8 G/DL (ref 13–17.7)
MAGNESIUM SERPL-MCNC: 2 MG/DL (ref 1.6–2.4)
MCH RBC QN AUTO: 29.9 PG (ref 26.6–33)
MCHC RBC AUTO-ENTMCNC: 33.4 G/DL (ref 31.5–35.7)
MCV RBC AUTO: 89.5 FL (ref 79–97)
PHOSPHATE SERPL-MCNC: 2.6 MG/DL (ref 2.5–4.5)
PLATELET # BLD AUTO: 79 10*3/MM3 (ref 140–450)
PMV BLD AUTO: 10.7 FL (ref 6–12)
POTASSIUM SERPL-SCNC: 5.2 MMOL/L (ref 3.5–5.2)
RBC # BLD AUTO: 4.28 10*6/MM3 (ref 4.14–5.8)
SODIUM SERPL-SCNC: 136 MMOL/L (ref 136–145)
WBC NRBC COR # BLD: 3.02 10*3/MM3 (ref 3.4–10.8)

## 2023-06-13 PROCEDURE — 94664 DEMO&/EVAL PT USE INHALER: CPT

## 2023-06-13 PROCEDURE — 63710000001 INSULIN LISPRO (HUMAN) PER 5 UNITS: Performed by: NURSE PRACTITIONER

## 2023-06-13 PROCEDURE — 94761 N-INVAS EAR/PLS OXIMETRY MLT: CPT

## 2023-06-13 PROCEDURE — 85027 COMPLETE CBC AUTOMATED: CPT | Performed by: NURSE PRACTITIONER

## 2023-06-13 PROCEDURE — 83735 ASSAY OF MAGNESIUM: CPT | Performed by: INTERNAL MEDICINE

## 2023-06-13 PROCEDURE — 82948 REAGENT STRIP/BLOOD GLUCOSE: CPT

## 2023-06-13 PROCEDURE — 97162 PT EVAL MOD COMPLEX 30 MIN: CPT

## 2023-06-13 PROCEDURE — 80069 RENAL FUNCTION PANEL: CPT | Performed by: INTERNAL MEDICINE

## 2023-06-13 PROCEDURE — 94799 UNLISTED PULMONARY SVC/PX: CPT

## 2023-06-13 PROCEDURE — 25010000002 METHYLPREDNISOLONE PER 40 MG: Performed by: INTERNAL MEDICINE

## 2023-06-13 RX ORDER — INSULIN LISPRO 100 [IU]/ML
2-9 INJECTION, SOLUTION INTRAVENOUS; SUBCUTANEOUS
Status: DISCONTINUED | OUTPATIENT
Start: 2023-06-13 | End: 2023-06-14 | Stop reason: HOSPADM

## 2023-06-13 RX ORDER — TORSEMIDE 20 MG/1
40 TABLET ORAL
Status: DISCONTINUED | OUTPATIENT
Start: 2023-06-13 | End: 2023-06-14

## 2023-06-13 RX ORDER — PREDNISONE 20 MG/1
40 TABLET ORAL
Status: DISCONTINUED | OUTPATIENT
Start: 2023-06-14 | End: 2023-06-14 | Stop reason: HOSPADM

## 2023-06-13 RX ADMIN — BUDESONIDE AND FORMOTEROL FUMARATE DIHYDRATE 2 PUFF: 160; 4.5 AEROSOL RESPIRATORY (INHALATION) at 08:06

## 2023-06-13 RX ADMIN — METHYLPREDNISOLONE SODIUM SUCCINATE 40 MG: 40 INJECTION, POWDER, FOR SOLUTION INTRAMUSCULAR; INTRAVENOUS at 06:39

## 2023-06-13 RX ADMIN — INSULIN LISPRO 2 UNITS: 100 INJECTION, SOLUTION INTRAVENOUS; SUBCUTANEOUS at 21:42

## 2023-06-13 RX ADMIN — BUPROPION HYDROCHLORIDE 150 MG: 150 TABLET, EXTENDED RELEASE ORAL at 08:54

## 2023-06-13 RX ADMIN — BUDESONIDE AND FORMOTEROL FUMARATE DIHYDRATE 2 PUFF: 160; 4.5 AEROSOL RESPIRATORY (INHALATION) at 20:00

## 2023-06-13 RX ADMIN — HYDROCODONE BITARTRATE AND ACETAMINOPHEN 1 TABLET: 10; 325 TABLET ORAL at 08:54

## 2023-06-13 RX ADMIN — AMLODIPINE BESYLATE 10 MG: 10 TABLET ORAL at 08:54

## 2023-06-13 RX ADMIN — BUMETANIDE 1 MG: 0.25 INJECTION INTRAMUSCULAR; INTRAVENOUS at 06:39

## 2023-06-13 RX ADMIN — ATORVASTATIN CALCIUM 40 MG: 20 TABLET, FILM COATED ORAL at 20:32

## 2023-06-13 RX ADMIN — LEVOTHYROXINE SODIUM 25 MCG: 0.03 TABLET ORAL at 06:39

## 2023-06-13 RX ADMIN — HYDROCODONE BITARTRATE AND ACETAMINOPHEN 1 TABLET: 10; 325 TABLET ORAL at 19:16

## 2023-06-13 RX ADMIN — IPRATROPIUM BROMIDE AND ALBUTEROL SULFATE 3 ML: .5; 3 SOLUTION RESPIRATORY (INHALATION) at 08:04

## 2023-06-13 RX ADMIN — TORSEMIDE 40 MG: 20 TABLET ORAL at 19:13

## 2023-06-13 RX ADMIN — IPRATROPIUM BROMIDE AND ALBUTEROL SULFATE 3 ML: .5; 3 SOLUTION RESPIRATORY (INHALATION) at 15:28

## 2023-06-13 RX ADMIN — IPRATROPIUM BROMIDE AND ALBUTEROL SULFATE 3 ML: .5; 3 SOLUTION RESPIRATORY (INHALATION) at 19:52

## 2023-06-13 RX ADMIN — INSULIN LISPRO 2 UNITS: 100 INJECTION, SOLUTION INTRAVENOUS; SUBCUTANEOUS at 08:55

## 2023-06-13 RX ADMIN — TORSEMIDE 40 MG: 20 TABLET ORAL at 11:29

## 2023-06-13 NOTE — PROGRESS NOTES
Name: Brandon Alan ADMIT: 2023   : 1945  PCP: Wong Cuellar MD    MRN: [id number] LOS: 3 days   AGE/SEX: 78 y.o. male  ROOM: [facility]/     Subjective   Subjective   Feeling better; breathing near baseline. O2 down to home dose 2L. +BM yesterday       Objective   Objective   Vital Signs  Temp:  [97.7 °F (36.5 °C)-98 °F (36.7 °C)] 97.7 °F (36.5 °C)  Heart Rate:  [85-90] 90  Resp:  [16-18] 16  BP: (129-151)/(71-79) 151/79  SpO2:  [91 %-97 %] 97 %  on  Flow (L/min):  [2-2.5] 2;   Device (Oxygen Therapy): nasal cannula  Body mass index is 46.33 kg/m².  Physical Exam  Vitals and nursing note reviewed.   Constitutional:       General: He is not in acute distress.     Appearance: He is obese. He is ill-appearing. He is not toxic-appearing.   HENT:      Head: Normocephalic.      Mouth/Throat:      Mouth: Mucous membranes are moist.   Eyes:      Conjunctiva/sclera: Conjunctivae normal.   Cardiovascular:      Rate and Rhythm: Normal rate and regular rhythm.   Pulmonary:      Effort: Pulmonary effort is normal. No respiratory distress.      Breath sounds: Examination of the right-lower field reveals decreased breath sounds. Examination of the left-lower field reveals decreased breath sounds.   Abdominal:      General: Bowel sounds are normal.      Palpations: Abdomen is soft.   Musculoskeletal:      Cervical back: Neck supple.      Right lower leg: Edema present.      Left lower leg: Edema present.      Comments: 1-2+   Skin:     General: Skin is warm and dry.   Neurological:      Mental Status: He is alert and oriented to person, place, and time.   Psychiatric:         Mood and Affect: Mood normal.         Behavior: Behavior normal.     Results Review     I reviewed the patient's new clinical results.  Results from last 7 days   Lab Units 23  0410 23  0453 06/10/23  0439 23  1431   WBC 10*3/mm3 3.02* 3.81 2.89* 3.46   HEMOGLOBIN g/dL 12.8* 12.4* 12.0* 12.0*   PLATELETS 10*3/mm3 79* 117*  98* 85*     Results from last 7 days   Lab Units 06/13/23  0410 06/12/23  0703 06/11/23  1306 06/11/23  0432   SODIUM mmol/L 136 137 138 134*   POTASSIUM mmol/L 5.2 5.1 5.7* 6.2*   CHLORIDE mmol/L 95* 96* 102 101   CO2 mmol/L 36.6* 32.1* 26.3 26.0   BUN mg/dL 29* 43* 55* 60*   CREATININE mg/dL 1.06 1.21 1.82* 2.05*   GLUCOSE mg/dL 202* 239* 277* 270*   EGFR mL/min/1.73 72.3 61.7 37.8* 32.8*     Results from last 7 days   Lab Units 06/13/23  0410 06/11/23  0432 06/09/23  1431   ALBUMIN g/dL 3.4* 3.5 2.9*   BILIRUBIN mg/dL  --  0.3 0.5   ALK PHOS U/L  --  101 74   AST (SGOT) U/L  --  24 33   ALT (SGPT) U/L  --  19 22     Results from last 7 days   Lab Units 06/13/23  0410 06/12/23  0703 06/11/23  1306 06/11/23  0432 06/10/23  0439 06/09/23  1431   CALCIUM mg/dL 8.9 9.0 8.8 7.8*   < > 8.4*   ALBUMIN g/dL 3.4*  --   --  3.5  --  2.9*   MAGNESIUM mg/dL 2.0  --   --   --   --   --    PHOSPHORUS mg/dL 2.6  --   --   --   --   --     < > = values in this interval not displayed.       Hemoglobin A1C   Date/Time Value Ref Range Status   06/11/2023 0432 5.50 4.80 - 5.60 % Final     Glucose   Date/Time Value Ref Range Status   06/13/2023 1131 134 (H) 70 - 130 mg/dL Final     Comment:     Meter: [id number] : [id number] Adriana Baron NA   06/13/2023 0723 169 (H) 70 - 130 mg/dL Final     Comment:     Meter: [id number] : [id number] Adriana Baron NA   06/12/2023 2117 244 (H) 70 - 130 mg/dL Final     Comment:     Meter: [id number] : [id number] Siena Garcia NA   06/12/2023 1628 232 (H) 70 - 130 mg/dL Final     Comment:     Meter: [id number] : [id number] Acricki Faby NA   06/12/2023 1159 181 (H) 70 - 130 mg/dL Final     Comment:     Meter: [id number] : [id number] Acricki Faby NA   06/12/2023 0811 195 (H) 70 - 130 mg/dL Final     Comment:     Meter: [id number] : [id number] Acricki Faby NA   06/11/2023 2031 184 (H) 70 - 130 mg/dL Final     Comment:     Meter: [id number] : [id number] Farrah  Bhumi HARRIS       No radiology results for the last day  I have personally reviewed all medications:  Scheduled Medications  amLODIPine, 10 mg, Oral, Q24H  atorvastatin, 40 mg, Oral, Nightly  budesonide-formoterol, 2 puff, Inhalation, BID - RT  buPROPion SR, 150 mg, Oral, Daily  insulin lispro, 2-9 Units, Subcutaneous, 4x Daily AC & at Bedtime  ipratropium-albuterol, 3 mL, Nebulization, Q6H While Awake - RT  lactulose, 10 g, Oral, TID  levothyroxine, 25 mcg, Oral, Q AM  [START ON 6/14/2023] predniSONE, 40 mg, Oral, Daily With Breakfast  senna-docusate sodium, 2 tablet, Oral, BID  torsemide, 40 mg, Oral, BID    Infusions   Diet  Diet: Cardiac Diets, Renal Diets, Fluid Restriction (240 mL/tray) Diets; Healthy Heart (2-3 Na+); Low Potassium; 1500 mL/day; Texture: Regular Texture (IDDSI 7); Fluid Consistency: Thin (IDDSI 0)    I have personally reviewed:  [x]  Laboratory   [x]  Microbiology   [x]  Radiology   [x]  EKG/Telemetry  [x]  Cardiology/Vascular   []  Pathology    []  Records       Assessment/Plan     Active Hospital Problems    Diagnosis  POA    **Acute on chronic congestive heart failure, unspecified heart failure type [I50.9]  Yes    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       78 y.o. male admitted with Acute on chronic congestive heart failure, unspecified heart failure type.    Acute on chronic HF  - xray chest showing Cardiomegaly with pulmonary vascular congestion and pleural effusions.  [facility] CT imaging done back in May did show pleural effusion with compression atelectasis at that time  - appreciate nephrology seeing.  They have changed lasix to bumex, now po    -Cardiology following, was able to see prior Echo 2/2023 EF 50-55%, no  evidence of aortic stenosis. Agrees w/diuresis per Nephrology, BP control, and can consider SGLT2i outpatient  - TSH WNL.      Hyperkalemia/ ELBERT on CKD3  -K elevated, given lokelma, now wnl  -Nephrology following, assisting w/diuresis as above  -Avoid NSAIDS     DM type 2 with peripheral neuropathy  - hold metformin   - A1C 5.50%  -Low dose correctional scale insulin, increase to moderate  -He is on high-dose gabapentin 900 mg 3 times daily.  He does see a pain management doctor Dr Glenroy Nichols who manages this and his Norco.     HTN  -Is on amlodipine and atorvastatin combo as outpatient  -His amlodipine is likely contributing some to his peripheral edema but he does report he does wear support hose as an outpatient.  Continue for now as this is kidney friendly.      Chronic obstructive pulmonary disease / dyspnea on exertion/ right lung pulmonary nodule/ pulmonary HTN/acute hypoxic respiratory failure/ ADITYA  -Based on his reports it appears that he has undergone significant pulmonary work-up to include pulmonary function testing as well as CT imaging.  Based on care everywhere records it appears that he is followed by [facility] pulmonologist Dr. Jeronimo.   - Continue Symbicort and nebs have been ordered.   -CT performed at [facility] facility on 5/20/2023 did show small bilateral pleural effusions with associated compression atelectasis, cardiomegaly with dilation of the pulmonary vasculature consistent with pulmonary artery hypertension, mediastinal lymphadenopathy, right pulmonary nodule ([facility] radiology recommended f/u with Fleishner criteria) and hepatic cirrhosis.  -Change steroids to po x 3 more days  -  RVP negative.   - hx of aditya but has not been able to afford PAP.  Is on nocturnal 02.      Hypothyroidism:   - TSH normal.   -Continue current home dose of levothyroxine     Constipation  - Continue bowel regimen, +BM     Pancytopenia:   - plt count waxing/waning, back down to 79  - overall stable, continue to  monitor            SCDs for DVT prophylaxis.  Full code.  Discussed with patient.  Anticipate discharge home with family  tomorrow if cleared by consultants .      MARGUERITE Peralta  [facility]  06/13/23  12:58 EDT

## 2023-06-13 NOTE — PROGRESS NOTES
"    Patient Name: Brandon Alan  :1945  78 y.o.      Patient Care Team:  Wong Cuellar MD as PCP - General (Family Medicine)    Chief Complaint:   SOB    Interval History:   Significant UOP overnight. Feels slightly improved.    Objective   Vital Signs  Temp:  [97.7 °F (36.5 °C)-98.3 °F (36.8 °C)] 97.7 °F (36.5 °C)  Heart Rate:  [85-95] 90  Resp:  [16-18] 16  BP: (129-158)/(71-79) 151/79    Intake/Output Summary (Last 24 hours) at 2023 0821  Last data filed at 2023 0352  Gross per 24 hour   Intake 1530 ml   Output 5975 ml   Net -4445 ml     Flowsheet Rows      Flowsheet Row First Filed Value   Admission Height 188 cm (74\") Documented at 2023 1734   Admission Weight 172 kg (380 lb 1.6 oz) Documented at 06/10/2023 0500            GEN: no distress, alert and oriented  HEENT: NACT, EOMI, moist mucous membranes, nasal cannula in place, poor dentition  Lungs: CTAB, no wheezes, rales or rhonchi  CV: normal rate, regular rhythm, normal S1, S2, 2/6 systolic murmur, +2 radial pulses b/l  Abdomen: soft, nontender, nondistended, NABS  Extremities: 1+ edema  Skin: no rash, warm, dry  Heme/Lymph: no bruising  Psych: organized thought, normal behavior and affect    Results Review:    Results from last 7 days   Lab Units 23  0410   SODIUM mmol/L 136   POTASSIUM mmol/L 5.2   CHLORIDE mmol/L 95*   CO2 mmol/L 36.6*   BUN mg/dL 29*   CREATININE mg/dL 1.06   GLUCOSE mg/dL 202*   CALCIUM mg/dL 8.9     Results from last 7 days   Lab Units 06/10/23  0049 23  2253 23  1431   HSTROP T ng/L 20* 21* 21*     Results from last 7 days   Lab Units 23  0410   WBC 10*3/mm3 3.02*   HEMOGLOBIN g/dL 12.8*   HEMATOCRIT % 38.3   PLATELETS 10*3/mm3 79*         Results from last 7 days   Lab Units 23  0410   MAGNESIUM mg/dL 2.0                   Medication Review:   amLODIPine, 10 mg, Oral, Q24H  atorvastatin, 40 mg, Oral, Nightly  budesonide-formoterol, 2 puff, Inhalation, BID - RT  bumetanide, 1 " mg, Intravenous, Q12H  buPROPion SR, 150 mg, Oral, Daily  insulin lispro, 2-7 Units, Subcutaneous, 4x Daily AC & at Bedtime  ipratropium-albuterol, 3 mL, Nebulization, Q6H While Awake - RT  lactulose, 10 g, Oral, TID  levothyroxine, 25 mcg, Oral, Q AM  methylPREDNISolone sodium succinate, 40 mg, Intravenous, Q8H  senna-docusate sodium, 2 tablet, Oral, BID              Assessment & Plan   #Volume overload  #Acute Hypoxic respiratory failure  #Systolic murmur  #Hyperkalemia  #COPD  #Hypertension  #YOAN  #Diabetes     77 yo with COPD on 2L of home O2, hypertension, YOAN (no CPAP) and diabetes who presented to the ED on 6/9 with increasing shortness of breath, found to be volume overloaded on exam and with acute hypoxic respiratory failure      Echocardiogram was technically difficult and patient refused Definity.     Discussed importance of evaluating LV function with patient and he will think about agreeing to Definity.  He also has a systolic murmur which he notes is new.  Aortic valve was difficult to visualize and Doppler tracing does not appear accurate. There is a mention of aortic stenosis in a note in CareEverywhere 2/20/23. Echocardiogram obtained from [facility] with EF 50-55%, aortic valve difficult to visualize with mean gradient 4 mmHg.      Creatinine continues to improve.    - Continue diuretics per nephrology  - Given prior echo with normal EF and minimal aortic valve gradients, HFrEF and aortic stenosis have been ruled out. Would not repeat echocardiogram at this time and continue treatment for presumed HFpEF with diuresis and BP control. Can consider addition of SGLT2i as outpatient.    Serafin Swartz MD  [facility]  06/13/23  08:21 EDT

## 2023-06-13 NOTE — PLAN OF CARE
Goal Outcome Evaluation:  Plan of Care Reviewed With: patient       Pt is 77 y/o M admitted to [facility] on 6/9/23 with cough/SOB. At baseline pt is indep with mobility using cane. Pt lives with his daughter, 3 ADDIE with rail. Pt currently demos supervision with bed mobility, SBA for ambulation in room up to 20'. Pt noted to have increased work of breathing and fatigue with room ambulation. On 2 L throughout session, does de-sat to 87% with activity but recovers to 92% with 45 sec seated rest. Pt demos mobility below baseline and therefore would benefit from acute skilled PT to address functional mobility deficits. Anticipate d/c home with assist and home PT.

## 2023-06-13 NOTE — PLAN OF CARE
Problem: Fall Injury Risk  Goal: Absence of Fall and Fall-Related Injury  6/13/2023 0749 by Sheyla Hernandez, RN  Outcome: Ongoing, Progressing   Goal Outcome Evaluation:      Diuretic in Use: IV BUMEX  Response to Diuretics (Output greater than intake): YES  Daily Weight (up or down): DOWN  O2 Requirements: 2 L/M N/C  Functional Status (Activity level, tolerance and respiratory UP WITH WALKERsymptoms):   Discharge Plans:  TBD

## 2023-06-13 NOTE — PAYOR COMM NOTE
"Heidy Cee (78 y.o. Male)     ATTN: NURSE REVIEWER  RE: INITIAL INPT AUTH CLINICALS  AUTH: [id number]                                 PLEASE REPLY TO FARZAD VALLES [phone] OR FAX# [phone]      Date of Birth   1945    Social Security Number       Address   [address] [id number]    Home Phone   [phone]    MRN   [id number]       Zoroastrian   None    Marital Status   Single                            Admission Date   6/9/23    Admission Type   Emergency    Admitting Provider   Anna Nolasco MD    Attending Provider   Antony Pastor MD    Department, Room/Bed   [address], [address]/1       Discharge Date       Discharge Disposition       Discharge Destination                                 Attending Provider: Antony Pastor MD    Allergies: Penicillins    Isolation: None   Infection: None   Code Status: CPR    Ht: 188 cm (74.02\")   Wt: 164 kg (361 lb)    Admission Cmt: None   Principal Problem: Acute on chronic congestive heart failure, unspecified heart failure type [I50.9]                   Active Insurance as of 6/9/2023       Primary Coverage       Payor Plan Insurance Group Employer/Plan Group    [employer] MEDICARE REPLACEMENT [facility] DUAL COMPLETE MEDICARE REPLACEMENT [id number]       Payor Plan Address Payor Plan Phone Number Payor Plan Fax Number Effective Dates     [address] [phone]  1/1/2023 - None Entered    [facility] [id number]         Subscriber Name Subscriber Birth Date Member ID       HEIDY CEE 1945 [id number]                     Emergency Contacts        (Rel.) Home Phone Work Phone Mobile Phone    Emily Barrett (Relative) -- -- [phone]    CeeVeronica ball (Daughter) -- -- [phone]                 History & Physical        Anna Nolasco MD at 06/09/23 2044          HISTORY AND PHYSICAL   [facility]        Date of Admission: " 2023  Patient Identification:  Name: Brandon Alan  Age: 78 y.o.  Sex: male  :  1945  MRN: [id number]                     Primary Care Physician: Wong Cuellar MD    Chief Complaint:  78 year old gentleman who presented to the emergency room with shortness of breath; he has some chronic dyspnea but has felt worse for the last few weeks; he denies chest pain; he has a history of copd and is on home oxygen; he has noted leg swelling and feels worse with lying down; shortness of breath is worse with exertion; no fever or chills     History of Present Illness:   As above    Past Medical History:  Past Medical History:   Diagnosis Date    Depression     Diabetes mellitus     Hypertension      Past Surgical History:  History reviewed. No pertinent surgical history.   Home Meds:  Medications Prior to Admission   Medication Sig Dispense Refill Last Dose    albuterol sulfate  (90 Base) MCG/ACT inhaler Inhale 2 puffs Every 4 (Four) Hours As Needed for Wheezing or Shortness of Air. 1 inhaler 0 2023    amLODIPine-atorvastatin (CADUET) 10-40 MG per tablet Take 1 tablet by mouth Daily.   2023    buPROPion SR (WELLBUTRIN SR) 150 MG 12 hr tablet Take 1 tablet by mouth Daily.   2023    gabapentin (NEURONTIN) 300 MG capsule Take 1 capsule by mouth 3 (Three) Times a Day.   2023 at 0600    HYDROcodone-acetaminophen (NORCO)  MG per tablet Take 1 tablet by mouth Every 6 (Six) Hours As Needed for Moderate Pain.   2023    levothyroxine (SYNTHROID, LEVOTHROID) 25 MCG tablet Take 1 tablet by mouth Daily.   2023    meloxicam (MOBIC) 15 MG tablet Take 1 tablet by mouth Daily.   2023    metFORMIN (GLUCOPHAGE) 850 MG tablet Take 1 tablet by mouth 2 (Two) Times a Day With Meals.   2023 at 0700       Allergies:  Allergies   Allergen Reactions    Penicillins      Immunizations:    There is no immunization history on file for this patient.  Social History:   Social History     Social  "History Narrative    Not on file     Social History     Socioeconomic History    Marital status: Single   Tobacco Use    Smoking status: Never   Vaping Use    Vaping Use: Never used   Substance and Sexual Activity    Alcohol use: No    Drug use: No    Sexual activity: Defer       Family History:  History reviewed. No pertinent family history.     Review of Systems  See history of present illness and past medical history.  Patient denies headache, dizziness, syncope, falls, trauma, change in vision, change in hearing, change in taste, changes in weight, changes in appetite, focal weakness, numbness, or paresthesia.  Patient denies chest pain, palpitations sinus pressure, rhinorrhea, epistaxis, hemoptysis, nausea, vomiting,hematemesis, diarrhea, constipation or hematochezia.  Denies cold or heat intolerance, polydipsia, polyuria, polyphagia. Denies hematuria, pyuria, dysuria, hesitancy, frequency or urgency. Denies consumption of raw and under cooked meats foods or change in water source.  Denies fever, chills, sweats, night sweats.  Denies missing any routine medications. Remainder of ROS is negative.    Objective:  T Max 24 hrs: Temp (24hrs), Av.3 °F (36.3 °C), Min:97 °F (36.1 °C), Max:97.6 °F (36.4 °C)    Vitals Ranges:   Temp:  [97 °F (36.1 °C)-97.6 °F (36.4 °C)] 97.6 °F (36.4 °C)  Heart Rate:  [51-58] 52  Resp:  [16-22] 20  BP: ()/(53-64) 96/53      Exam:  BP 96/53 (BP Location: Left arm, Patient Position: Sitting)   Pulse 52   Temp 97.6 °F (36.4 °C) (Oral)   Resp 20   Ht 188 cm (74\")   SpO2 90%   BMI 43.44 kg/m²     General Appearance:    Alert, cooperative, no distress, appears stated age   Head:    Normocephalic, without obvious abnormality, atraumatic   Eyes:    PERRL, conjunctivae/corneas clear, EOM's intact, both eyes   Ears:    Normal external ear canals, both ears   Nose:   Nares normal, septum midline, mucosa normal, no drainage    or sinus tenderness   Throat:   Lips, mucosa, and tongue " normal   Neck:   Supple, symmetrical, trachea midline, no adenopathy;     thyroid:  no enlargement/tenderness/nodules; no carotid    bruit or JVD   Back:     Symmetric, no curvature, ROM normal, no CVA tenderness   Lungs:     Decreased breath sounds bilaterally, respirations unlabored   Chest Wall:    No tenderness or deformity    Heart:    Regular rate and rhythm, S1 and S2 normal, no murmur, rub   or gallop   Abdomen:     Soft, nontender, bowel sounds active all four quadrants,     no masses, no hepatomegaly, no splenomegaly   Extremities:   Extremities normal, atraumatic, no cyanosis or edema   Pulses:   2+ and symmetric all extremities   Skin:   Skin color, texture, turgor normal, no rashes or lesions               .    Data Review:  Labs in chart were reviewed.  WBC   Date Value Ref Range Status   06/09/2023 3.46 3.40 - 10.80 10*3/mm3 Final     Hemoglobin   Date Value Ref Range Status   06/09/2023 12.0 (L) 13.0 - 17.7 g/dL Final     Hematocrit   Date Value Ref Range Status   06/09/2023 36.1 (L) 37.5 - 51.0 % Final     Platelets   Date Value Ref Range Status   06/09/2023 85 (L) 140 - 450 10*3/mm3 Final     Sodium   Date Value Ref Range Status   06/09/2023 138 136 - 145 mmol/L Final     Potassium   Date Value Ref Range Status   06/09/2023 4.9 3.5 - 5.2 mmol/L Final     Chloride   Date Value Ref Range Status   06/09/2023 104 98 - 107 mmol/L Final     CO2   Date Value Ref Range Status   06/09/2023 26.2 22.0 - 29.0 mmol/L Final     BUN   Date Value Ref Range Status   06/09/2023 33 (H) 8 - 23 mg/dL Final     Creatinine   Date Value Ref Range Status   06/09/2023 1.39 (H) 0.76 - 1.27 mg/dL Final     Glucose   Date Value Ref Range Status   06/09/2023 96 65 - 99 mg/dL Final     Calcium   Date Value Ref Range Status   06/09/2023 8.4 (L) 8.6 - 10.5 mg/dL Final                Imaging Results (All)       Procedure Component Value Units Date/Time    XR Chest 2 View [[id number]] Collected: 06/09/23 1602     Updated: 06/09/23  1608    Narrative:      XR CHEST 2 VW-     HISTORY: Male who is 78 years-old,  short of breath     TECHNIQUE: Frontal and lateral views of the chest     COMPARISON: 05/02/2019     FINDINGS:     The heart is enlarged. Pulmonary vasculature is congested. No focal  pulmonary consolidation. Minimal pleural effusions are suggested. No  pneumothorax. Otherwise stable.       Impression:      No focal pulmonary consolidation. Cardiomegaly with  pulmonary vascular congestion with minimal pleural effusions.     This report was finalized on 6/9/2023 4:05 PM by Dr. Sudeep Boothe M.D.                 Assessment:  Active Hospital Problems    Diagnosis  POA    **Acute on chronic congestive heart failure, unspecified heart failure type [I50.9]  Yes      Resolved Hospital Problems   No resolved problems to display.   chronic hypoxic respiratory failure  Obesity  Diabetes  Hypertension  ckd3    Plan:  Continue diuresis  Trend troponin  accu checks, sliding scale insulin  Monitor bp  Monitor on telemetry  dw patient and ed provider    Anna Nolasco MD  6/9/2023  20:45 EDT      Electronically signed by Anna Nolasco MD at 06/09/23 2049       Facility-Administered Medications as of 6/13/2023   Medication Dose Route Frequency Provider Last Rate Last Admin    acetaminophen (TYLENOL) tablet 650 mg  650 mg Oral Q4H PRN Anna Nolasco MD   650 mg at 06/10/23 2039    [COMPLETED] albuterol (PROVENTIL) nebulizer solution 0.083% 2.5 mg/3mL  2.5 mg Nebulization Once Medardo Puente MD   2.5 mg at 06/09/23 1540    albuterol (PROVENTIL) nebulizer solution 0.083% 2.5 mg/3mL  2.5 mg Nebulization Q6H PRN Anna Nolasco MD        amLODIPine (NORVASC) tablet 10 mg  10 mg Oral Q24H Anna Nolasco MD   10 mg at 06/13/23 0854    atorvastatin (LIPITOR) tablet 40 mg  40 mg Oral Nightly Anna Nolasco MD   40 mg at 06/12/23 2143    sennosides-docusate (PERICOLACE) 8.6-50 MG per tablet 2 tablet  2  tablet Oral BID Anna Nolasco MD   2 tablet at 06/12/23 2143    And    polyethylene glycol (MIRALAX) packet 17 g  17 g Oral Daily PRN Anna Nolasco MD        And    bisacodyl (DULCOLAX) EC tablet 5 mg  5 mg Oral Daily PRN Anna Nolasco MD        And    bisacodyl (DULCOLAX) suppository 10 mg  10 mg Rectal Daily PRN Anna Nolasco MD        budesonide-formoterol (SYMBICORT) 160-4.5 MCG/ACT inhaler 2 puff  2 puff Inhalation BID - RT Soo Jaramillo APRN   2 puff at 06/13/23 0806    buPROPion SR (WELLBUTRIN SR) 12 hr tablet 150 mg  150 mg Oral Daily Anna Nolasco MD   150 mg at 06/13/23 0854    dextrose (D50W) (25 g/50 mL) IV injection 25 g  25 g Intravenous Q15 Min PRN Soo Jaramillo APRN   25 g at 06/11/23 1054    dextrose (GLUTOSE) oral gel 15 g  15 g Oral Q15 Min PRN Soo Jaramillo APRN        [COMPLETED] furosemide (LASIX) injection 80 mg  80 mg Intravenous Once Medardo Puente MD   80 mg at 06/09/23 1523    glucagon (GLUCAGEN) injection 1 mg  1 mg Intramuscular Q15 Min PRN Soo Jaramillo APRN        HYDROcodone-acetaminophen (NORCO)  MG per tablet 1 tablet  1 tablet Oral Q6H PRN Anna Nolasco MD   1 tablet at 06/13/23 0854    [COMPLETED] insulin lispro (HUMALOG/ADMELOG) injection 10 Units  10 Units Subcutaneous Once Chente Ahn MD   10 Units at 06/11/23 1055    insulin lispro (HUMALOG/ADMELOG) injection 2-7 Units  2-7 Units Subcutaneous 4x Daily AC & at Bedtime Soo Jaramillo APRN   2 Units at 06/13/23 0855    [COMPLETED] ipratropium-albuterol (DUO-NEB) nebulizer solution 3 mL  3 mL Nebulization Once Medardo Puente MD   3 mL at 06/09/23 1542    ipratropium-albuterol (DUO-NEB) nebulizer solution 3 mL  3 mL Nebulization Q6H While Awake - RT Anna Nolasco MD   3 mL at 06/13/23 0804    lactulose (CHRONULAC) 10 GM/15ML solution 10 g  10 g Oral TID Soo Jaramillo APRN   10 g at 06/12/23 2146    levothyroxine  (SYNTHROID, LEVOTHROID) tablet 25 mcg  25 mcg Oral Q AM Anna Nolasco MD   25 mcg at 06/13/23 0639    [COMPLETED] magnesium sulfate 2g/50 mL (PREMIX) infusion  2 g Intravenous Once Medardo Puente MD   Stopped at 06/09/23 1908    melatonin tablet 3 mg  3 mg Oral Nightly PRN Anna Nolasco MD        methylPREDNISolone sodium succinate (SOLU-Medrol) injection 40 mg  40 mg Intravenous Q8H Anna Nolasco MD   40 mg at 06/13/23 0639    nitroglycerin (NITROSTAT) SL tablet 0.4 mg  0.4 mg Sublingual Q5 Min PRN Soo Jaramillo APRN        ondansetron (ZOFRAN) tablet 4 mg  4 mg Oral Q6H PRN Anna Nolasco MD        Or    ondansetron (ZOFRAN) injection 4 mg  4 mg Intravenous Q6H PRN Anna Nolasco MD        [COMPLETED] predniSONE (DELTASONE) tablet 60 mg  60 mg Oral Once Medardo Puente MD   60 mg at 06/09/23 1522    sodium chloride 0.9 % flush 10 mL  10 mL Intravenous PRN Medardo Puente MD        [COMPLETED] sodium zirconium cyclosilicate (LOKELMA) pack 10 g  10 g Oral Once Soo Jaramillo APRN   10 g at 06/10/23 1300    [COMPLETED] sodium zirconium cyclosilicate (LOKELMA) pack 10 g  10 g Oral Once Nazanin Taveras APRN   10 g at 06/11/23 0839    torsemide (DEMADEX) tablet 40 mg  40 mg Oral BID Tia Hoffman MD         Lab Results (last 72 hours)       Procedure Component Value Units Date/Time    POC Glucose Once [[id number]]  (Abnormal) Collected: 06/13/23 0723    Specimen: Blood Updated: 06/13/23 0724     Glucose 169 mg/dL      Comment: Meter: [id number] : [id number] Adriana Loraine NA       CBC (No Diff) [[id number]]  (Abnormal) Collected: 06/13/23 0410    Specimen: Blood Updated: 06/13/23 0551     WBC 3.02 10*3/mm3      RBC 4.28 10*6/mm3      Hemoglobin 12.8 g/dL      Hematocrit 38.3 %      MCV 89.5 fL      MCH 29.9 pg      MCHC 33.4 g/dL      RDW 13.2 %      RDW-SD 44.0 fl      MPV 10.7 fL      Platelets 79 10*3/mm3     Renal Function Panel [[id number]]   (Abnormal) Collected: 06/13/23 0410    Specimen: Blood Updated: 06/13/23 0528     Glucose 202 mg/dL      BUN 29 mg/dL      Creatinine 1.06 mg/dL      Sodium 136 mmol/L      Potassium 5.2 mmol/L      Comment: Slight hemolysis detected by analyzer. Results may be affected.        Chloride 95 mmol/L      CO2 36.6 mmol/L      Calcium 8.9 mg/dL      Albumin 3.4 g/dL      Phosphorus 2.6 mg/dL      Anion Gap 4.4 mmol/L      BUN/Creatinine Ratio 27.4     eGFR 72.3 mL/min/1.73     Narrative:      GFR Normal >60  Chronic Kidney Disease <60  Kidney Failure <15    The GFR formula is only valid for adults with stable renal function between ages 18 and 70.    Magnesium [[id number]]  (Normal) Collected: 06/13/23 0410    Specimen: Blood Updated: 06/13/23 0528     Magnesium 2.0 mg/dL     POC Glucose Once [[id number]]  (Abnormal) Collected: 06/12/23 2117    Specimen: Blood Updated: 06/12/23 2118     Glucose 244 mg/dL      Comment: Meter: [id number] : [id number] Siena HARRIS       POC Glucose Once [[id number]]  (Abnormal) Collected: 06/12/23 1628    Specimen: Blood Updated: 06/12/23 1630     Glucose 232 mg/dL      Comment: Meter: [id number] : [id number] Spring HARRIS       POC Glucose Once [[id number]]  (Abnormal) Collected: 06/12/23 1159    Specimen: Blood Updated: 06/12/23 1202     Glucose 181 mg/dL      Comment: Meter: [id number] : [id number] Spring Hobbs NA       POC Glucose Once [[id number]]  (Abnormal) Collected: 06/12/23 0811    Specimen: Blood Updated: 06/12/23 0812     Glucose 195 mg/dL      Comment: Meter: [id number] : [id number] Spring HARRIS       Basic Metabolic Panel [[id number]]  (Abnormal) Collected: 06/12/23 0703    Specimen: Blood Updated: 06/12/23 0754     Glucose 239 mg/dL      BUN 43 mg/dL      Creatinine 1.21 mg/dL      Sodium 137 mmol/L      Potassium 5.1 mmol/L      Chloride 96 mmol/L      CO2 32.1 mmol/L      Calcium 9.0 mg/dL      BUN/Creatinine Ratio 35.5     Anion Gap 8.9 mmol/L       eGFR 61.7 mL/min/1.73     Narrative:      GFR Normal >60  Chronic Kidney Disease <60  Kidney Failure <15    The GFR formula is only valid for adults with stable renal function between ages 18 and 70.    CBC & Differential [[id number]]  (Abnormal) Collected: 06/12/23 0453    Specimen: Blood Updated: 06/12/23 0517    Narrative:      The following orders were created for panel order CBC & Differential.  Procedure                               Abnormality         Status                     ---------                               -----------         ------                     CBC Auto Differential[[id number]]        Abnormal            Final result                 Please view results for these tests on the individual orders.    CBC Auto Differential [[id number]]  (Abnormal) Collected: 06/12/23 0453    Specimen: Blood Updated: 06/12/23 0517     WBC 3.81 10*3/mm3      RBC 4.10 10*6/mm3      Hemoglobin 12.4 g/dL      Hematocrit 38.4 %      MCV 93.7 fL      MCH 30.2 pg      MCHC 32.3 g/dL      RDW 13.5 %      RDW-SD 46.3 fl      MPV 12.4 fL      Platelets 117 10*3/mm3      Neutrophil % 88.4 %      Lymphocyte % 7.1 %      Monocyte % 4.2 %      Eosinophil % 0.0 %      Basophil % 0.0 %      Immature Grans % 0.3 %      Neutrophils, Absolute 3.37 10*3/mm3      Lymphocytes, Absolute 0.27 10*3/mm3      Monocytes, Absolute 0.16 10*3/mm3      Eosinophils, Absolute 0.00 10*3/mm3      Basophils, Absolute 0.00 10*3/mm3      Immature Grans, Absolute 0.01 10*3/mm3      nRBC 0.0 /100 WBC     POC Glucose Once [[id number]]  (Abnormal) Collected: 06/11/23 2031    Specimen: Blood Updated: 06/11/23 2032     Glucose 184 mg/dL      Comment: Meter: [id number] : [id number] Farrah HARRIS       POC Glucose Once [[id number]]  (Abnormal) Collected: 06/11/23 1636    Specimen: Blood Updated: 06/11/23 1639     Glucose 166 mg/dL      Comment: Meter: [id number] : [id number] Kaushik HARRIS       Basic Metabolic Panel [[id number]]   (Abnormal) Collected: 06/11/23 1306    Specimen: Blood Updated: 06/11/23 1351     Glucose 277 mg/dL      BUN 55 mg/dL      Creatinine 1.82 mg/dL      Sodium 138 mmol/L      Potassium 5.7 mmol/L      Chloride 102 mmol/L      CO2 26.3 mmol/L      Calcium 8.8 mg/dL      BUN/Creatinine Ratio 30.2     Anion Gap 9.7 mmol/L      eGFR 37.8 mL/min/1.73     Narrative:      GFR Normal >60  Chronic Kidney Disease <60  Kidney Failure <15    The GFR formula is only valid for adults with stable renal function between ages 18 and 70.    POC Glucose Once [[id number]]  (Abnormal) Collected: 06/11/23 1246    Specimen: Blood Updated: 06/11/23 1247     Glucose 256 mg/dL      Comment: Meter: [id number] : [id number] Kaushik Rebeka STEVEN       Sodium, Urine, Random - Urine, Clean Catch [[id number]] Collected: 06/11/23 1143    Specimen: Urine, Clean Catch Updated: 06/11/23 1221     Sodium, Urine 35 mmol/L     Narrative:      Reference intervals for random urine have not been established.  Clinical usage is dependent upon physician's interpretation in combination with other laboratory tests.       Chloride, Urine, Random - Urine, Clean Catch [[id number]] Collected: 06/11/23 1143    Specimen: Urine, Clean Catch Updated: 06/11/23 1221     Chloride, Urine 33 mmol/L     Narrative:      Reference intervals for random urine have not been established.  Clinical usage is dependent upon physician's interpretation in combination with other laboratory tests.       Protein / Creatinine Ratio, Urine - Urine, Clean Catch [[id number]] Collected: 06/11/23 1143    Specimen: Urine, Clean Catch Updated: 06/11/23 1220     Protein/Creatinine Ratio, Urine 79.8 mg/G Crea      Creatinine, Urine 97.7 mg/dL      Total Protein, Urine 7.8 mg/dL     Urinalysis With Microscopic If Indicated (No Culture) - Urine, Clean Catch [[id number]]  (Normal) Collected: 06/11/23 1143    Specimen: Urine, Clean Catch Updated: 06/11/23 1201     Color, UA Yellow     Appearance, UA Clear      pH, UA 5.5     Specific Gravity, UA 1.019     Glucose, UA Negative     Ketones, UA Negative     Bilirubin, UA Negative     Blood, UA Negative     Protein, UA Negative     Leuk Esterase, UA Negative     Nitrite, UA Negative     Urobilinogen, UA 0.2 E.U./dL    Narrative:      Urine microscopic not indicated.    POC Glucose Once [[id number]]  (Abnormal) Collected: 06/11/23 0731    Specimen: Blood Updated: 06/11/23 0732     Glucose 196 mg/dL      Comment: Meter: [id number] : [id number] Kaushik HARRIS       Comprehensive Metabolic Panel [[id number]]  (Abnormal) Collected: 06/11/23 0432    Specimen: Blood Updated: 06/11/23 0534     Glucose 270 mg/dL      BUN 60 mg/dL      Creatinine 2.05 mg/dL      Sodium 134 mmol/L      Potassium 6.2 mmol/L      Chloride 101 mmol/L      CO2 26.0 mmol/L      Calcium 7.8 mg/dL      Total Protein 7.4 g/dL      Albumin 3.5 g/dL      ALT (SGPT) 19 U/L      AST (SGOT) 24 U/L      Alkaline Phosphatase 101 U/L      Total Bilirubin 0.3 mg/dL      Globulin 3.9 gm/dL      A/G Ratio 0.9 g/dL      BUN/Creatinine Ratio 29.3     Anion Gap 7.0 mmol/L      eGFR 32.8 mL/min/1.73     Narrative:      GFR Normal >60  Chronic Kidney Disease <60  Kidney Failure <15    The GFR formula is only valid for adults with stable renal function between ages 18 and 70.    Hemoglobin A1c [[id number]]  (Normal) Collected: 06/11/23 0432    Specimen: Blood Updated: 06/11/23 0512     Hemoglobin A1C 5.50 %     Narrative:      Hemoglobin A1C Ranges:    Increased Risk for Diabetes  5.7% to 6.4%  Diabetes                     >= 6.5%  Diabetic Goal                < 7.0%    POC Glucose Once [[id number]]  (Abnormal) Collected: 06/10/23 2029    Specimen: Blood Updated: 06/10/23 2030     Glucose 146 mg/dL      Comment: Meter: [id number] : [id number] Siena HARRIS       POC Glucose Once [[id number]]  (Normal) Collected: 06/10/23 1716    Specimen: Blood Updated: 06/10/23 1718     Glucose 114 mg/dL      Comment:  Meter: [id number] : [id number] Kaushik HARRIS       TSH Rfx On Abnormal To Free T4 [[id number]]  (Normal) Collected: 06/10/23 0631    Specimen: Blood Updated: 06/10/23 1522     TSH 1.400 uIU/mL     POC Glucose Once [[id number]]  (Abnormal) Collected: 06/10/23 1243    Specimen: Blood Updated: 06/10/23 1245     Glucose 226 mg/dL      Comment: Meter: [id number] : [id number] Adriana Baron NA       POC Glucose Once [[id number]]  (Abnormal) Collected: 06/10/23 1136    Specimen: Blood Updated: 06/10/23 1138     Glucose 240 mg/dL      Comment: Meter: [id number] : [id number] Kaushik HARRIS             Imaging Results (Last 72 Hours)       Procedure Component Value Units Date/Time    US Renal Bilateral [[id number]] Collected: 06/11/23 1437     Updated: 06/11/23 1442    Narrative:      US RENAL BILATERAL-     Clinical: Acute renal insufficiency     FINDINGS: The right kidney is 12.2 cm in length and left kidney is 12.8  cm in length.     Due to the patient's body habitus, the left kidney could only vaguely be  demonstrated. No hydronephrosis or gross calculus seen. The overall  renal cortical echotexture is within normal limits.     The right kidney is normal. No calculus or obstructive uropathy nor  mass. The cortical echotexture is within normal limits.     Limited bladder survey. The bladder was near collapse. Neither the right  or left ureteral jet could be documented.     This report was finalized on 6/11/2023 2:39 PM by Dr. Dick Delgado M.D.             ECG/EMG Results (last 72 hours)       Procedure Component Value Units Date/Time    ECG 12 Lead Electrolyte Imbalance [[id number]] Collected: 06/10/23 1125     Updated: 06/10/23 1229     QT Interval 453 ms     Narrative:      HEART RATE= 64  bpm  RR Interval= 938  ms  DE Interval= 203  ms  P Horizontal Axis= -2  deg  P Front Axis= 73  deg  QRSD Interval= 140  ms  QT Interval= 453  ms  QRS Axis= -44  deg  T Wave Axis= 80  deg  - ABNORMAL ECG -  Sinus  rhythm  Left bundle branch block  No change from previous tracing  Electronically Signed By: Favio Herman ([facility]) 10-Phillip-2023 12:28:54  Date and Time of Study: 2023-06-10 11:25:36    Adult Transthoracic Echo Complete W/ Cont if Necessary Per Protocol [[id number]] Resulted: 06/11/23 1426     Updated: 06/11/23 1429     EF(MOD-bp) 51.7 %      LVIDd 3.9 cm      LVIDs 2.9 cm      IVSd 1.24 cm      LVPWd 1.17 cm      FS 24.4 %      IVS/LVPW 1.06 cm      ESV(cubed) 24.8 ml      EDV(cubed) 57.3 ml      LVOT area 4.0 cm2      LV mass(C)d 157.8 grams      LVOT diam 2.26 cm      EDV(MOD-sp2) 136.0 ml      EDV(MOD-sp4) 129.0 ml      ESV(MOD-sp2) 67.0 ml      ESV(MOD-sp4) 63.0 ml      SV(MOD-sp2) 69.0 ml      SV(MOD-sp4) 66.0 ml      EF(MOD-sp2) 50.7 %      EF(MOD-sp4) 51.2 %      MV E max yonathan 65.7 cm/sec      MV A max yonathan 84.7 cm/sec      MV dec time 0.17 msec      MV E/A 0.78     Pulm A Revs Dur 0.15 sec      MV A dur 0.17 sec      LA ESV Index (BP) 31.4 ml/m2      Med Peak E' Yonathan 9.7 cm/sec      Lat Peak E' Yonathan 10.1 cm/sec      Avg E/e' ratio 6.64     SV(LVOT) 77.7 ml      SV(RVOT) 100.4 ml      Qp/Qs 1.29     RV Base 3.5 cm      RV Mid 2.7 cm      RV Length 9.1 cm      TAPSE (>1.6) 2.7 cm      RV S' 15.9 cm/sec      Pulm Sys Yonathan 25.0 cm/sec      Pulm River Yonathan 19.3 cm/sec      Pulm S/D 1.30     Pulm A Revs Yonathan 23.7 cm/sec      LV V1 max 102.8 cm/sec      LV V1 max PG 4.2 mmHg      LV V1 mean PG 2.01 mmHg      LV V1 VTI 19.3 cm      Ao pk yonathan 118.1 cm/sec      Ao max PG 5.6 mmHg      Ao mean PG 3.0 mmHg      Ao V2 VTI 28.0 cm      BEHZAD(I,D) 2.8 cm2      MV max PG 10.4 mmHg      MV mean PG 4.6 mmHg      MV V2 VTI 46.5 cm      MV P1/2t 88.9 msec      MVA(P1/2t) 2.47 cm2      MVA(VTI) 1.67 cm2      MV dec slope 536.5 cm/sec2      RVOT diam 2.7 cm      RV V1 max PG 2.05 mmHg      RV V1 max 71.6 cm/sec      RV V1 VTI 17.4 cm      PA V2 max 108.6 cm/sec      PA acc time 0.08 sec      Ao root diam 3.5 cm      Sinus 3.2 cm       Dimensionless Index 0.70 (DI)     Narrative:        The study is technically suboptimal for diagnosis.    Left ventricle not well visualized. Segmental wall motion cannot be   commented on as a left ventricle is not well visualized. Ejection fraction   cannot be determined. Patient did not want Definity      SCANNED - TELEMETRY   [[id number]] Resulted: 06/09/23     Updated: 06/12/23 0416    SCANNED - TELEMETRY   [[id number]] Resulted: 06/09/23     Updated: 06/12/23 0416    SCANNED - TELEMETRY   [[id number]] Resulted: 06/09/23     Updated: 06/12/23 0423    SCANNED - TELEMETRY   [[id number]] Resulted: 06/09/23     Updated: 06/12/23 0554    SCANNED - TELEMETRY   [[id number]] Resulted: 06/09/23     Updated: 06/12/23 0554    SCANNED - TELEMETRY   [[id number]] Resulted: 06/09/23     Updated: 06/12/23 0634    SCANNED - TELEMETRY   [[id number]] Resulted: 06/09/23     Updated: 06/12/23 0919    SCANNED - TELEMETRY   [[id number]] Resulted: 06/09/23     Updated: 06/13/23 0656          Orders (last 72 hrs)        Start     Ordered    06/14/23 0600  Renal Function Panel  Morning Draw         06/13/23 0942    06/13/23 1100  torsemide (DEMADEX) tablet 40 mg  2 Times Daily (Diuretics)         06/13/23 0942    06/13/23 0725  POC Glucose Once  PROCEDURE ONCE         06/13/23 0723    06/13/23 0600  CBC (No Diff)  Morning Draw         06/12/23 1355    06/13/23 0600  Basic Metabolic Panel  Morning Draw,   Status:  Canceled         06/12/23 1355    06/13/23 0600  Magnesium  Morning Draw         06/12/23 1758    06/13/23 0600  Renal Function Panel  Morning Draw         06/12/23 1758    06/13/23 0000  bumetanide (BUMEX) injection 1 mg  Every 12 Hours,   Status:  Discontinued         06/12/23 1753 06/12/23 2119  POC Glucose Once  PROCEDURE ONCE         06/12/23 2117 06/12/23 1757  Diet: Cardiac Diets, Renal Diets, Fluid Restriction (240 mL/tray) Diets; Healthy Heart (2-3 Na+); Low Potassium; 1500 mL/day; Texture: Regular Texture (IDDSI 7);  Fluid Consistency: Thin (IDDSI 0)  Diet Effective Now         06/12/23 1756    06/12/23 1631  POC Glucose Once  PROCEDURE ONCE         06/12/23 1628    06/12/23 1203  POC Glucose Once  PROCEDURE ONCE         06/12/23 1159    06/12/23 0813  POC Glucose Once  PROCEDURE ONCE         06/12/23 0811    06/12/23 0702  Inpatient Cardiology Consult  IN AM        Specialty:  Cardiology  Provider:  James Bucio Jr., MD    06/11/23 1851 06/12/23 0702  Inpatient Pulmonology Consult  IN AM,   Status:  Canceled        Specialty:  Pulmonary Disease  Provider:  Matt Baez MD    06/11/23 1856    06/12/23 0702  Inpatient Pulmonology Consult  IN AM        Specialty:  Pulmonary Disease  Provider:  Matt Baez MD    06/11/23 1858    06/12/23 0600  CBC & Differential  Morning Draw         06/11/23 1902    06/12/23 0600  CBC Auto Differential  PROCEDURE ONCE         06/11/23 2205    06/12/23 0547  Basic Metabolic Panel  Morning Draw         06/11/23 1901    06/12/23 0000  Ambulatory Referral to Heart Failure Clinic         06/12/23 0947    06/11/23 2100  lactulose (CHRONULAC) 10 GM/15ML solution 10 g  3 Times Daily         06/11/23 1859    06/11/23 2033  POC Glucose Once  PROCEDURE ONCE         06/11/23 2031    06/11/23 1902  PT Consult: Eval & Treat Functional Mobility Below Baseline  Once         06/11/23 1901    06/11/23 1640  POC Glucose Once  PROCEDURE ONCE         06/11/23 1636    06/11/23 1521  Measure Post Void Residual  Every Shift       06/11/23 1520    06/11/23 1316  US Renal Bilateral  1 Time Imaging         06/11/23 1104    06/11/23 1300  Basic Metabolic Panel  Once         06/11/23 1104    06/11/23 1248  POC Glucose Once  PROCEDURE ONCE         06/11/23 1246    06/11/23 1200  bumetanide (BUMEX) injection 4 mg  Every 12 Hours,   Status:  Discontinued         06/11/23 1104    06/11/23 1151  Diet: Cardiac Diets, Renal Diets; Healthy Heart (2-3 Na+); Low Potassium; Texture: Regular Texture (IDDSI 7); Fluid  "Consistency: Thin (IDDSI 0)  Diet Effective Now,   Status:  Canceled         06/11/23 1151    06/11/23 1145  insulin lispro (HUMALOG/ADMELOG) injection 10 Units  Once         06/11/23 1053    06/11/23 1105  Urinalysis With Microscopic If Indicated (No Culture) - Urine, Clean Catch  Once         06/11/23 1104    06/11/23 1104  Protein / Creatinine Ratio, Urine - Urine, Clean Catch  Once         06/11/23 1104    06/11/23 1104  Sodium, Urine, Random - Urine, Clean Catch  Once         06/11/23 1104    06/11/23 1104  Chloride, Urine, Random - Urine, Clean Catch  Once         06/11/23 1104    06/11/23 1104  US Renal Limited  1 Time Imaging,   Status:  Canceled         06/11/23 1104    06/11/23 1104  Bladder Scan  Once         06/11/23 1104    06/11/23 1058  Verbal order from Dr. Galindo \"give 1 amp of D50 - 25mg/50ml\".  Nursing Communication  Once        Comments: Verbal order from Dr. Galindo \"give 1 amp of D50 - 25mg/50ml\".    06/11/23 1059    06/11/23 0834  Urinalysis With Culture If Indicated - Urine, Clean Catch  Once,   Status:  Canceled         06/11/23 0833    06/11/23 0833  Duplex Renal Artery - Bilateral Complete CAR  Once,   Status:  Canceled         06/11/23 0833    06/11/23 0830  Inpatient Nephrology Consult  STAT        Specialty:  Nephrology  Provider:  Chente Ahn MD    06/11/23 0829    06/11/23 0822  Inpatient Nephrology Consult  Once,   Status:  Canceled        Specialty:  Nephrology  Provider:  Chente Ahn MD    06/11/23 0821    06/11/23 0733  POC Glucose Once  PROCEDURE ONCE         06/11/23 0731    06/11/23 0730  sodium zirconium cyclosilicate (LOKELMA) pack 10 g  Once         06/11/23 0547    06/11/23 0600  Hemoglobin A1c  Morning Draw         06/10/23 1132    06/11/23 0600  Comprehensive Metabolic Panel  Morning Draw         06/10/23 1924    06/10/23 2130  budesonide-formoterol (SYMBICORT) 160-4.5 MCG/ACT inhaler 2 puff  2 Times Daily - RT         06/10/23 1432    " 06/10/23 2031  POC Glucose Once  PROCEDURE ONCE         06/10/23 2029    06/10/23 1719  POC Glucose Once  PROCEDURE ONCE         06/10/23 1716    06/10/23 1659  Inpatient Admission  Once         06/10/23 1659    06/10/23 1439  TSH Rfx On Abnormal To Free T4  Once         06/10/23 1438    06/10/23 1438  Adult Transthoracic Echo Complete W/ Cont if Necessary Per Protocol  Once         06/10/23 1437    06/10/23 1246  POC Glucose Once  PROCEDURE ONCE         06/10/23 1243    06/10/23 1200  Vital Signs Every Hour and Per Hospital Policy Based on Patient Condition  Every Hour       06/10/23 1109    06/10/23 1200  sodium zirconium cyclosilicate (LOKELMA) pack 10 g  Once         06/10/23 1109    06/10/23 1138  POC Glucose Once  PROCEDURE ONCE         06/10/23 1136    06/10/23 1130  insulin lispro (HUMALOG/ADMELOG) injection 2-7 Units  4 Times Daily Before Meals & Nightly         06/10/23 0841    06/10/23 1108  Continuous Cardiac Monitoring  Continuous        Comments: Follow Standing Orders As Outlined in Process Instructions (Open Order Report to View Full Instructions)    06/10/23 1109    06/10/23 1108  Telemetry - Maintain IV Access  Continuous         06/10/23 1109    06/10/23 1108  Telemetry - Place Orders & Notify Provider of Results When Patient Experiences Acute Chest Pain, Dysrhythmia or Respiratory Distress  Until Discontinued         06/10/23 1109    06/10/23 1108  May Be Off Telemetry for Tests  Continuous         06/10/23 1109    06/10/23 1108  Continuous Pulse Oximetry  Continuous         06/10/23 1109    06/10/23 1108  ECG 12 Lead Electrolyte Imbalance  STAT         06/10/23 1109    06/10/23 1107  nitroglycerin (NITROSTAT) SL tablet 0.4 mg  Every 5 Minutes PRN         06/10/23 1109    06/10/23 1100  POC Glucose 4x Daily AC & at Bedtime  4 Times Daily Before Meals & at Bedtime       06/10/23 0841    06/10/23 0900  buPROPion SR (WELLBUTRIN SR) 12 hr tablet 150 mg  Daily         06/09/23 2051    06/10/23 0900   amLODIPine (NORVASC) tablet 10 mg  Every 24 Hours Scheduled         06/09/23 2051    06/10/23 0840  dextrose (GLUTOSE) oral gel 15 g  Every 15 Minutes PRN         06/10/23 0841    06/10/23 0840  dextrose (D50W) (25 g/50 mL) IV injection 25 g  Every 15 Minutes PRN         06/10/23 0841    06/10/23 0840  glucagon (GLUCAGEN) injection 1 mg  Every 15 Minutes PRN         06/10/23 0841    06/10/23 0600  levothyroxine (SYNTHROID, LEVOTHROID) tablet 25 mcg  Every Early Morning         06/09/23 2051 06/09/23 2145  gabapentin (NEURONTIN) capsule 300 mg  3 Times Daily,   Status:  Discontinued         06/09/23 2051 06/09/23 2145  atorvastatin (LIPITOR) tablet 40 mg  Nightly         06/09/23 2051 06/09/23 2145  furosemide (LASIX) injection 40 mg  Every 12 Hours,   Status:  Discontinued         06/09/23 2052 06/09/23 2145  methylPREDNISolone sodium succinate (SOLU-Medrol) injection 40 mg  Every 8 Hours         06/09/23 2052 06/09/23 2145  ipratropium-albuterol (DUO-NEB) nebulizer solution 3 mL  Every 6 Hours While Awake - RT         06/09/23 2052 06/09/23 2100  sennosides-docusate (PERICOLACE) 8.6-50 MG per tablet 2 tablet  2 Times Daily        See Hyperspace for full Linked Orders Report.    06/09/23 1741 06/09/23 2050  HYDROcodone-acetaminophen (NORCO)  MG per tablet 1 tablet  Every 6 Hours PRN         06/09/23 2051 06/09/23 2050  albuterol (PROVENTIL) nebulizer solution 0.083% 2.5 mg/3mL  Every 6 Hours PRN         06/09/23 2051 06/09/23 2000  Vital Signs  Every 4 Hours      Comments: Per per hospital policy    06/09/23 1741 06/09/23 1800  Oral Care  2 Times Daily       06/09/23 1741 06/09/23 1742  Daily Weights  Daily       06/09/23 1741 06/09/23 1741  Strict Intake & Output  Every Shift       06/09/23 1741 06/09/23 1740  bisacodyl (DULCOLAX) EC tablet 5 mg  Daily PRN        See Hyperspace for full Linked Orders Report.    06/09/23 1741 06/09/23 1740  bisacodyl (DULCOLAX)  suppository 10 mg  Daily PRN        See Hyperspace for full Linked Orders Report.    06/09/23 1741    06/09/23 1740  ondansetron (ZOFRAN) tablet 4 mg  Every 6 Hours PRN        See Hyperspace for full Linked Orders Report.    06/09/23 1741    06/09/23 1740  ondansetron (ZOFRAN) injection 4 mg  Every 6 Hours PRN        See Hyperspace for full Linked Orders Report.    06/09/23 1741    06/09/23 1740  melatonin tablet 3 mg  Nightly PRN         06/09/23 1741    06/09/23 1740  acetaminophen (TYLENOL) tablet 650 mg  Every 4 Hours PRN         06/09/23 1741    06/09/23 1740  polyethylene glycol (MIRALAX) packet 17 g  Daily PRN        See Hyperspace for full Linked Orders Report.    06/09/23 1741    06/09/23 1347  sodium chloride 0.9 % flush 10 mL  As Needed         06/09/23 1347    05/18/23 0000  HYDROcodone-acetaminophen (NORCO)  MG per tablet  Every 6 Hours PRN         06/09/23 1820    04/11/23 0000  levothyroxine (SYNTHROID, LEVOTHROID) 25 MCG tablet  Daily         06/09/23 1820    03/13/23 0000  gabapentin (NEURONTIN) 300 MG capsule  3 Times Daily         06/09/23 1820    Unscheduled  Oxygen Therapy- Nasal Cannula; Titrate 1-6 LPM Per SpO2; 90 - 95%  Continuous PRN       06/09/23 1347    Unscheduled  Up with assistance  As Needed       06/09/23 1741    Unscheduled  Measure Post Void Residual  As Needed       06/10/23 0837    Unscheduled  Follow Hypoglycemia Standing Orders For Blood Glucose <70 & Notify Provider of Treatment  As Needed      Comments: Follow Hypoglycemia Orders As Outlined in Process Instructions (Open Order Report to View Full Instructions)  Notify Provider Any Time Hypoglycemia Treatment is Administered    06/10/23 0841    Unscheduled  Measure Post Void Residual  As Needed       06/10/23 1439    Unscheduled  Measure Post Void Residual  As Needed       06/11/23 1104    --  SCANNED - TELEMETRY    Status:  Canceled         06/09/23 0000    --  SCANNED - TELEMETRY           06/09/23 0000    --  SCANNED  - TELEMETRY           23 0000    --  SCANNED - TELEMETRY           23 0000    --  SCANNED - TELEMETRY           23 0000    --  SCANNED - TELEMETRY           23 0000    --  SCANNED - TELEMETRY           23 0000    --  SCANNED - TELEMETRY           23 0000    --  SCANNED - TELEMETRY           23 0000                  Operative/Procedure Notes (last 72 hours)  Notes from 06/10/23 1047 through 23 1047   No notes of this type exist for this encounter.          Physician Progress Notes (last 72 hours)        Tia Hoffman MD at 23 0918              [facility] Progress Note      Patient Name: Brandon Alan  : 1945  MRN: [id number]  Primary Care Physician:  Wong Cuellar MD  Date of admission: 2023    Subjective     Interval History:   Follow up ELBERT.  Feeling better. Weight down.  On o2 2 liters at home and here. Eating well.  Up with PT this am.  Desats, but recovers.  DW Dr. Swartz.  [facility] echo with EF 55% , no real comment on diastolic dysfunction.  5.9 liters UOP with 1 mg IV bumex.       Review of Systems:   As noted above    Objective     Vitals:   Temp:  [97.7 °F (36.5 °C)-98.3 °F (36.8 °C)] 97.7 °F (36.5 °C)  Heart Rate:  [85-95] 90  Resp:  [16-18] 16  BP: (129-158)/(71-79) 151/79  Flow (L/min):  [2-3] 2    Intake/Output Summary (Last 24 hours) at 2023 0918  Last data filed at 2023 0904  Gross per 24 hour   Intake 760 ml   Output 4650 ml   Net -3890 ml       Physical Exam:    General Appearance: alert, oriented x 3, no acute distress . Morbidly obese  Skin: warm and dry  HEENT: oral mucosa poor dentition.  Nasal o2,  nonicteric sclera  Neck: supple, no JVD  Lungs: Clear to auscultation.   Heart: RRR, normal S1 and S2  Abdomen: soft, nontender, obese. Body wall edema.   : no palpable bladder  Extremities: 1+ lower ext edema.   Neuro: normal speech and mental status     Scheduled Meds:     amLODIPine, 10  mg, Oral, Q24H  atorvastatin, 40 mg, Oral, Nightly  budesonide-formoterol, 2 puff, Inhalation, BID - RT  bumetanide, 1 mg, Intravenous, Q12H  buPROPion SR, 150 mg, Oral, Daily  insulin lispro, 2-7 Units, Subcutaneous, 4x Daily AC & at Bedtime  ipratropium-albuterol, 3 mL, Nebulization, Q6H While Awake - RT  lactulose, 10 g, Oral, TID  levothyroxine, 25 mcg, Oral, Q AM  methylPREDNISolone sodium succinate, 40 mg, Intravenous, Q8H  senna-docusate sodium, 2 tablet, Oral, BID      IV Meds:        Results Reviewed:   I have personally reviewed the results from the time of this admission to 6/13/2023 09:18 EDT     Results from last 7 days   Lab Units 06/13/23  0410 06/12/23  0703 06/11/23  1306 06/11/23  0432 06/10/23  0439 06/09/23  1431   SODIUM mmol/L 136 137 138 134*   < > 138   POTASSIUM mmol/L 5.2 5.1 5.7* 6.2*   < > 4.9   CHLORIDE mmol/L 95* 96* 102 101   < > 104   CO2 mmol/L 36.6* 32.1* 26.3 26.0   < > 26.2   BUN mg/dL 29* 43* 55* 60*   < > 33*   CREATININE mg/dL 1.06 1.21 1.82* 2.05*   < > 1.39*   CALCIUM mg/dL 8.9 9.0 8.8 7.8*   < > 8.4*   BILIRUBIN mg/dL  --   --   --  0.3  --  0.5   ALK PHOS U/L  --   --   --  101  --  74   ALT (SGPT) U/L  --   --   --  19  --  22   AST (SGOT) U/L  --   --   --  24  --  33   GLUCOSE mg/dL 202* 239* 277* 270*   < > 96    < > = values in this interval not displayed.       Estimated Creatinine Clearance: 94.9 mL/min (by C-G formula based on SCr of 1.06 mg/dL).    Results from last 7 days   Lab Units 06/13/23  0410   MAGNESIUM mg/dL 2.0   PHOSPHORUS mg/dL 2.6             Results from last 7 days   Lab Units 06/13/23  0410 06/12/23  0453 06/10/23  0439 06/09/23  1431   WBC 10*3/mm3 3.02* 3.81 2.89* 3.46   HEMOGLOBIN g/dL 12.8* 12.4* 12.0* 12.0*   PLATELETS 10*3/mm3 79* 117* 98* 85*             Assessment / Plan     ASSESSMENT:  ELBERT, Acute on chronic heart failure ( no definitive echo  here).  Volume excess  Improving with diuresis. Creatinine improved.  K up a little with glucose  ".    2. Probable acute on chronic heart failure with systolic m.  Echo difficult.  No real information from it and declined Definity . Dw Dr. Swartz [facility] echo with EF 55%.    3. COPD on home o2.  YOAN  4. DM2  5. TCP chronic. 131 K in 2019.   6. Hypothyroid on replacement.   7.  HTN better controlled.   PLAN:  Change to po diuretic today.  2. DW Dr. Maxime Hoffman MD  23  09:18 EDT    [facility]  [phone]      Electronically signed by Tia Hoffman MD at 23 0941       Serafin Swartz MD at 23 0821              Patient Name: Brandon Alan  :1945  78 y.o.      Patient Care Team:  Wong Cuellar MD as PCP - General (Family Medicine)    Chief Complaint:   SOB    Interval History:   Significant UOP overnight. Feels slightly improved.    Objective   Vital Signs  Temp:  [97.7 °F (36.5 °C)-98.3 °F (36.8 °C)] 97.7 °F (36.5 °C)  Heart Rate:  [85-95] 90  Resp:  [16-18] 16  BP: (129-158)/(71-79) 151/79    Intake/Output Summary (Last 24 hours) at 2023 0821  Last data filed at 2023 0352  Gross per 24 hour   Intake 1530 ml   Output 5975 ml   Net -4445 ml     Flowsheet Rows      Flowsheet Row First Filed Value   Admission Height 188 cm (74\") Documented at 2023 1734   Admission Weight 172 kg (380 lb 1.6 oz) Documented at 06/10/2023 0500            GEN: no distress, alert and oriented  HEENT: NACT, EOMI, moist mucous membranes, nasal cannula in place, poor dentition  Lungs: CTAB, no wheezes, rales or rhonchi  CV: normal rate, regular rhythm, normal S1, S2, 2/6 systolic murmur, +2 radial pulses b/l  Abdomen: soft, nontender, nondistended, NABS  Extremities: 1+ edema  Skin: no rash, warm, dry  Heme/Lymph: no bruising  Psych: organized thought, normal behavior and affect    Results Review:    Results from last 7 days   Lab Units 23  0410   SODIUM mmol/L 136   POTASSIUM mmol/L 5.2   CHLORIDE mmol/L 95*   CO2 mmol/L 36.6*   BUN mg/dL 29* "   CREATININE mg/dL 1.06   GLUCOSE mg/dL 202*   CALCIUM mg/dL 8.9     Results from last 7 days   Lab Units 06/10/23  0049 06/09/23  2253 06/09/23  1431   HSTROP T ng/L 20* 21* 21*     Results from last 7 days   Lab Units 06/13/23  0410   WBC 10*3/mm3 3.02*   HEMOGLOBIN g/dL 12.8*   HEMATOCRIT % 38.3   PLATELETS 10*3/mm3 79*         Results from last 7 days   Lab Units 06/13/23  0410   MAGNESIUM mg/dL 2.0                   Medication Review:   amLODIPine, 10 mg, Oral, Q24H  atorvastatin, 40 mg, Oral, Nightly  budesonide-formoterol, 2 puff, Inhalation, BID - RT  bumetanide, 1 mg, Intravenous, Q12H  buPROPion SR, 150 mg, Oral, Daily  insulin lispro, 2-7 Units, Subcutaneous, 4x Daily AC & at Bedtime  ipratropium-albuterol, 3 mL, Nebulization, Q6H While Awake - RT  lactulose, 10 g, Oral, TID  levothyroxine, 25 mcg, Oral, Q AM  methylPREDNISolone sodium succinate, 40 mg, Intravenous, Q8H  senna-docusate sodium, 2 tablet, Oral, BID              Assessment & Plan   #Volume overload  #Acute Hypoxic respiratory failure  #Systolic murmur  #Hyperkalemia  #COPD  #Hypertension  #YOAN  #Diabetes     79 yo with COPD on 2L of home O2, hypertension, YOAN (no CPAP) and diabetes who presented to the ED on 6/9 with increasing shortness of breath, found to be volume overloaded on exam and with acute hypoxic respiratory failure      Echocardiogram was technically difficult and patient refused Definity.     Discussed importance of evaluating LV function with patient and he will think about agreeing to Definity.  He also has a systolic murmur which he notes is new.  Aortic valve was difficult to visualize and Doppler tracing does not appear accurate. There is a mention of aortic stenosis in a note in CareEverywhere 2/20/23. Echocardiogram obtained from [facility] notes TDS with EF 50-55%, aortic valve difficult to visualize with mean gradient 4 mmHg.      Creatinine continues to improve.    - Continue diuretics per nephrology  - Given prior echo with  normal EF and minimal aortic valve gradients, HFrEF and aortic stenosis have been ruled out. Would not repeat echocardiogram at this time and continue treatment for presumed HFpEF with diuresis and BP control. Can consider addition of SGLT2i as outpatient.    Serafin Swartz MD  [facility]  23  08:21 EDT      Electronically signed by Serafin Swartz MD at 23 1017       Chente Ahn MD at 23 7849              [facility] Progress Note      Patient Name: Brandon Alan  : 1945  MRN: [id number]  Primary Care Physician:  Wong Cuellar MD  Date of admission: 2023    Subjective     Interval History:   Breathing is comfortable on room air  Legs are less puffy  UOP yesterday 6.9 L on IV Bumex 4 mg q12h    Review of Systems:   As noted above    Objective     Vitals:   Temp:  [97.7 °F (36.5 °C)-98.4 °F (36.9 °C)] 98.3 °F (36.8 °C)  Heart Rate:  [81-95] 95  Resp:  [18] 18  BP: (126-158)/(70-78) 158/78  Flow (L/min):  [2-3] 2    Intake/Output Summary (Last 24 hours) at 2023 1749  Last data filed at 2023 1706  Gross per 24 hour   Intake 1880 ml   Output 7900 ml   Net -6020 ml       Physical Exam:    General: alert, oriented x 3, NAD, morbidly obese  Skin: warm and dry  HEENT: oral mucosa normal, nonicteric sclera  Neck: supple, no JVD  Lungs: Crackles in bases; not labored on room air  Heart: RRR, 2/6M, no rub  Abdomen: soft, nontender, nondistended  : no palpable bladder  Extremities: +1 edema both legs; venous stasis changes; malodorous  Neuro: normal speech and mental status     Scheduled Meds:     amLODIPine, 10 mg, Oral, Q24H  atorvastatin, 40 mg, Oral, Nightly  budesonide-formoterol, 2 puff, Inhalation, BID - RT  bumetanide, 4 mg, Intravenous, Q12H  buPROPion SR, 150 mg, Oral, Daily  insulin lispro, 2-7 Units, Subcutaneous, 4x Daily AC & at Bedtime  ipratropium-albuterol, 3 mL, Nebulization, Q6H While Awake - RT  lactulose, 10 g,  Oral, TID  levothyroxine, 25 mcg, Oral, Q AM  methylPREDNISolone sodium succinate, 40 mg, Intravenous, Q8H  senna-docusate sodium, 2 tablet, Oral, BID      IV Meds:        Results Reviewed:   I have personally reviewed the results from the time of this admission to 6/12/2023 17:49 EDT     Results from last 7 days   Lab Units 06/12/23  0703 06/11/23  1306 06/11/23  0432 06/10/23  0439 06/09/23  1431   SODIUM mmol/L 137 138 134*   < > 138   POTASSIUM mmol/L 5.1 5.7* 6.2*   < > 4.9   CHLORIDE mmol/L 96* 102 101   < > 104   CO2 mmol/L 32.1* 26.3 26.0   < > 26.2   BUN mg/dL 43* 55* 60*   < > 33*   CREATININE mg/dL 1.21 1.82* 2.05*   < > 1.39*   CALCIUM mg/dL 9.0 8.8 7.8*   < > 8.4*   BILIRUBIN mg/dL  --   --  0.3  --  0.5   ALK PHOS U/L  --   --  101  --  74   ALT (SGPT) U/L  --   --  19  --  22   AST (SGOT) U/L  --   --  24  --  33   GLUCOSE mg/dL 239* 277* 270*   < > 96    < > = values in this interval not displayed.       Estimated Creatinine Clearance: 84.6 mL/min (by C-G formula based on SCr of 1.21 mg/dL).                Results from last 7 days   Lab Units 06/12/23  0453 06/10/23  0439 06/09/23  1431   WBC 10*3/mm3 3.81 2.89* 3.46   HEMOGLOBIN g/dL 12.4* 12.0* 12.0*   PLATELETS 10*3/mm3 117* 98* 85*             Assessment / Plan     ASSESSMENT:  ELBERT, nonoliguric, improving:  prerenal due to CHF.  Stable electrolytes, with resolved hyperkalemia; responding well to diuretic  Acute on chronic CHF with mild heart murmur  Morbid obesity  Hypertension, exacerbated by volume overload  Anemia  Thrombocytopenia  COPD    PLAN:  Continue IV Bumex, but decrease dose to 1 mg BID  Add fluid restriction, 1.5 L/day  Surveillance labs    Thank you for involving us in the care of Brandon Alan.  Please feel free to call with any questions.    Chente Ahn MD  06/12/23  17:49 EDT    [facility]  [phone]    Please note that portions of this note were completed with a voice recognition  program.    Electronically signed by Chente Ahn MD at 23 [id number]       Adelia Baez APRN at 23 [id number]              Name: Brandon Alan ADMIT: 2023   : 1945  PCP: Wong Cuellar MD    MRN: [id number] LOS: 2 days   AGE/SEX: 78 y.o. male  ROOM: [facility] ([place])     Subjective   Subjective   Reports breathing is better but still gets soa w/exertion. Urinating well. Denies fever, chest pain, nausea. No BM since admitted. Baseline O2 2L, requiring 3L on exam      Objective   Objective   Vital Signs  Temp:  [97.7 °F (36.5 °C)-98.4 °F (36.9 °C)] 98.3 °F (36.8 °C)  Heart Rate:  [81-95] 95  Resp:  [18] 18  BP: (126-158)/(70-78) 158/78  SpO2:  [91 %-94 %] 92 %  on  Flow (L/min):  [3] 3;   Device (Oxygen Therapy): nasal cannula  Body mass index is 48.02 kg/m².  Physical Exam  Vitals and nursing note reviewed.   Constitutional:       General: He is not in acute distress.     Appearance: He is obese. He is ill-appearing. He is not toxic-appearing.   HENT:      Head: Normocephalic.      Mouth/Throat:      Mouth: Mucous membranes are moist.   Eyes:      Conjunctiva/sclera: Conjunctivae normal.   Cardiovascular:      Rate and Rhythm: Normal rate and regular rhythm.      Heart sounds: Murmur heard.   Pulmonary:      Effort: Pulmonary effort is normal. No respiratory distress.      Breath sounds: Examination of the right-lower field reveals decreased breath sounds. Examination of the left-lower field reveals decreased breath sounds. Decreased breath sounds present.   Abdominal:      General: Bowel sounds are normal.      Palpations: Abdomen is soft.   Musculoskeletal:      Cervical back: Neck supple.      Right lower leg: No edema.      Left lower leg: No edema.   Skin:     General: Skin is warm and dry.   Neurological:      Mental Status: He is alert and oriented to person, place, and time.   Psychiatric:         Mood and Affect: Mood normal.         Behavior: Behavior normal.       Results  Review     I reviewed the patient's new clinical results.  Results from last 7 days   Lab Units 06/12/23  0453 06/10/23  0439 06/09/23  1431   WBC 10*3/mm3 3.81 2.89* 3.46   HEMOGLOBIN g/dL 12.4* 12.0* 12.0*   PLATELETS 10*3/mm3 117* 98* 85*     Results from last 7 days   Lab Units 06/12/23  0703 06/11/23  1306 06/11/23  0432 06/10/23  0631 06/10/23  0439   SODIUM mmol/L 137 138 134*  --  131*   POTASSIUM mmol/L 5.1 5.7* 6.2* 6.2* 6.4*   CHLORIDE mmol/L 96* 102 101  --  98   CO2 mmol/L 32.1* 26.3 26.0  --  24.1   BUN mg/dL 43* 55* 60*  --  45*   CREATININE mg/dL 1.21 1.82* 2.05*  --  1.88*   GLUCOSE mg/dL 239* 277* 270*  --  241*   EGFR mL/min/1.73 61.7 37.8* 32.8*  --  36.3*     Results from last 7 days   Lab Units 06/11/23  0432 06/09/23  1431   ALBUMIN g/dL 3.5 2.9*   BILIRUBIN mg/dL 0.3 0.5   ALK PHOS U/L 101 74   AST (SGOT) U/L 24 33   ALT (SGPT) U/L 19 22     Results from last 7 days   Lab Units 06/12/23  0703 06/11/23  1306 06/11/23  0432 06/10/23  0439 06/09/23  1431   CALCIUM mg/dL 9.0 8.8 7.8* 8.0* 8.4*   ALBUMIN g/dL  --   --  3.5  --  2.9*       Hemoglobin A1C   Date/Time Value Ref Range Status   06/11/2023 0432 5.50 4.80 - 5.60 % Final     Glucose   Date/Time Value Ref Range Status   06/12/2023 1159 181 (H) 70 - 130 mg/dL Final     Comment:     Meter: [id number] : [id number] Spring Hobbs STEVEN   06/12/2023 0811 195 (H) 70 - 130 mg/dL Final     Comment:     Meter: [id number] : [id number] [place] Hobbs NA   06/11/2023 2031 184 (H) 70 - 130 mg/dL Final     Comment:     Meter: [id number] : [id number] Farrah Bhumi     06/11/2023 1636 166 (H) 70 - 130 mg/dL Final     Comment:     Meter: [id number] : [id number] Kaushik Rebeka    06/11/2023 1246 256 (H) 70 - 130 mg/dL Final     Comment:     Meter: [id number] : [id number] Kaushik Rebeka    06/11/2023 0731 196 (H) 70 - 130 mg/dL Final     Comment:     Meter: [id number] : [id number] Kaushik Rebeka    06/10/2023 2029 146 (H) 70  - 130 mg/dL Final     Comment:     Meter: [id number] : [id number] [place] STEVEN       No radiology results for the last day  I have personally reviewed all medications:  Scheduled Medications  amLODIPine, 10 mg, Oral, Q24H  atorvastatin, 40 mg, Oral, Nightly  budesonide-formoterol, 2 puff, Inhalation, BID - RT  bumetanide, 4 mg, Intravenous, Q12H  buPROPion SR, 150 mg, Oral, Daily  insulin lispro, 2-7 Units, Subcutaneous, 4x Daily AC & at Bedtime  ipratropium-albuterol, 3 mL, Nebulization, Q6H While Awake - RT  lactulose, 10 g, Oral, TID  levothyroxine, 25 mcg, Oral, Q AM  methylPREDNISolone sodium succinate, 40 mg, Intravenous, Q8H  senna-docusate sodium, 2 tablet, Oral, BID    Infusions   Diet  Diet: Cardiac Diets, Renal Diets; Healthy Heart (2-3 Na+); Low Potassium; Texture: Regular Texture (IDDSI 7); Fluid Consistency: Thin (IDDSI 0)    I have personally reviewed:  [x]  Laboratory   [x]  Microbiology   [x]  Radiology   [x]  EKG/Telemetry  [x]  Cardiology/Vascular   []  Pathology    []  Records      Assessment/Plan     Active Hospital Problems    Diagnosis  POA    **Acute on chronic congestive heart failure, unspecified heart failure type [I50.9]  Yes    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       78 y.o. male admitted with Acute on chronic congestive heart failure, unspecified heart failure type.    Acute on chronic HF  - xray chest showing Cardiomegaly with pulmonary vascular congestion and pleural effusions.  [facility] CT imaging done back in May did show pleural effusion with compression atelectasis at that time  - appreciate nephrology seeing.  They have changed lasix to bumex.    -Unable to access previous echocardiograms  performed at [facility].  Noted most recent 1 / 2023. Repeat Echo here unable to determine EF due to technically difficulty study. Cardiology following, recommends definity, has refused but now considering  - TSH WNL.      Hyperkalemia/ ELBERT on CKD3  -K elevated, given lokelma, now wnl  -Nephrology following, assisting w/diuresis as above  -Avoid NSAIDS     DM type 2 with peripheral neuropathy  - hold metformin   - A1C 5.50%  -Low dose correctional scale insulin  -He is on high-dose gabapentin 900 mg 3 times daily.  He does see a pain management doctor Dr Glenroy Nichols who manages this and his Norco.     HTN  -Is on amlodipine and atorvastatin combo as outpatient  -His amlodipine is likely contributing some to his peripheral edema but he does report he does wear support hose as an outpatient.  Continue for now as this is kidney friendly.      Chronic obstructive pulmonary disease / dyspnea on exertion/ right lung pulmonary nodule/ pulmonary HTN/acute hypoxic respiratory failure/ ADITYA  -Based on his reports it appears that he has undergone significant pulmonary work-up to include pulmonary function testing as well as CT imaging.  Based on care everywhere records it appears that he is followed by [facility] pulmonologist Dr. Jeronimo.   - Continue Symbicort and nebs have been ordered.   -CT performed at outlying facility on 5/20/2023 did show small bilateral pleural effusions with associated compression atelectasis, cardiomegaly with dilation of the pulmonary vasculature consistent with pulmonary artery hypertension, mediastinal lymphadenopathy, right pulmonary nodule ([facility] radiology recommended f/u with Fleishner criteria) and hepatic cirrhosis.  -Pulmonology consulted  -  RVP negative.   - hx of aditya but has not been able to afford PAP.  Is on nocturnal 02.      Hypothyroidism:   - TSH normal.   -Continue current home dose of levothyroxine     Constipation  - adjust bowel regimen  - abd exam benign, no nausea or vomiting.  +  flatus.      Pancytopenia:   - plt count is trending up, 117K today  - overall improving, monitor        SCDs for DVT prophylaxis.  Full code.  Discussed with patient.  Anticipate discharge home later this week..      MARGUERITE Peralta  [facility]  23  13:39 EDT        Electronically signed by Adelia Baez APRN at 23 1354       Soo Jaramillo APRN at 23 6727              Name: Brandon Alan ADMIT: 2023   : 1945  PCP: Wong Cuellar MD    MRN: [id number] LOS: 1 days   AGE/SEX: 78 y.o. male  ROOM: [facility]     Subjective   Subjective     He reports breathing is about the same as yesterday.  Still feels orthopneic.  States HAILE about the same.   He has a history of obstructive sleep apnea but is currently not on PAP therapy due to financial issues.  He was started on nasal cannula O2 at night at 2 L to assist with his nocturnal hypoxemia about 6 weeks ago.  He states he drinks large amount of water at home, Over several liters a day.  Does not have a scale at home. Reports constipation.  Last BM he thinks Thursday.      Review of systems:  General: Denies fever or chills.  Appetite good  Cardiovascular: Denies chest pain.  Positive orthopnea  Respiratory: Denies cough, + HAILE  GI: Denies nausea or vomiting or abdominal pain    Objective   Objective   Vital Signs  Temp:  [97.4 °F (36.3 °C)-98.6 °F (37 °C)] 98.4 °F (36.9 °C)  Heart Rate:  [72-82] 82  Resp:  [18-20] 18  BP: ()/(57-70) 117/70  SpO2:  [90 %-95 %] 95 %  on  Flow (L/min):  [2-3] 3;   Device (Oxygen Therapy): nasal cannula  Body mass index is 49.23 kg/m².    Physical Exam  Vitals and nursing note reviewed.   Constitutional:       General: He is not in acute distress.     Appearance: He is obese. He is ill-appearing (Chronically ill-appearing).   Eyes:      General: No scleral icterus.     Conjunctiva/sclera: Conjunctivae normal.   Cardiovascular:      Rate and Rhythm: Normal  rate and regular rhythm.      Pulses: Normal pulses.      Heart sounds: Normal heart sounds. No murmur heard.  Pulmonary:      Effort: No respiratory distress.      Breath sounds: Normal breath sounds. No wheezing.      Comments: Less conversational  dyspnea.   Abdominal:      General: Bowel sounds are normal. There is distension.      Tenderness: There is no abdominal tenderness. There is no guarding.      Comments: Obese   Musculoskeletal:      Right lower leg: Edema present.      Left lower leg: Edema present.      Comments: 2+ edema up just past his knees   Skin:     General: Skin is warm and dry.      Capillary Refill: Capillary refill takes less than 2 seconds.   Neurological:      General: No focal deficit present.      Mental Status: He is alert and oriented to person, place, and time. Mental status is at baseline.   Psychiatric:         Mood and Affect: Mood normal.         Behavior: Behavior normal.         Thought Content: Thought content normal.         Judgment: Judgment normal.       Intake/Output Summary (Last 24 hours) at 6/11/2023 1517  Last data filed at 6/11/2023 1030  Gross per 24 hour   Intake 570 ml   Output 1750 ml   Net -1180 ml     Echo obtained.  Poor quality windows.  Unable to determine EF.  Pt declined definity    nterpretation Summary echo 6/1123         The study is technically suboptimal for diagnosis.    Left ventricle not well visualized. Segmental wall motion cannot be commented on as a left ventricle is not well visualized. Ejection fraction cannot be determined. Patient did not want Definity   .         Results Review  I reviewed the patient's new clinical results.  Results from last 7 days   Lab Units 06/10/23  0439 06/09/23  1431   WBC 10*3/mm3 2.89* 3.46   HEMOGLOBIN g/dL 12.0* 12.0*   PLATELETS 10*3/mm3 98* 85*       Results from last 7 days   Lab Units 06/11/23  1306 06/11/23  0432 06/10/23  0631 06/10/23  0439 06/09/23  1431   SODIUM mmol/L 138 134*  --  131* 138    POTASSIUM mmol/L 5.7* 6.2* 6.2* 6.4* 4.9   CHLORIDE mmol/L 102 101  --  98 104   CO2 mmol/L 26.3 26.0  --  24.1 26.2   BUN mg/dL 55* 60*  --  45* 33*   CREATININE mg/dL 1.82* 2.05*  --  1.88* 1.39*   GLUCOSE mg/dL 277* 270*  --  241* 96       Lab Results   Component Value Date    ANIONGAP 9.7 06/11/2023     Estimated Creatinine Clearance: 57.2 mL/min (A) (by C-G formula based on SCr of 1.82 mg/dL (H)).    Results from last 7 days   Lab Units 06/11/23  0432 06/09/23  1431   ALBUMIN g/dL 3.5 2.9*   BILIRUBIN mg/dL 0.3 0.5   ALK PHOS U/L 101 74   AST (SGOT) U/L 24 33   ALT (SGPT) U/L 19 22       Results from last 7 days   Lab Units 06/11/23  1306 06/11/23  0432 06/10/23  0439 06/09/23  1431   CALCIUM mg/dL 8.8 7.8* 8.0* 8.4*   ALBUMIN g/dL  --  3.5  --  2.9*         Hemoglobin A1C   Date/Time Value Ref Range Status   06/11/2023 0432 5.50 4.80 - 5.60 % Final     Glucose   Date/Time Value Ref Range Status   06/11/2023 1246 256 (H) 70 - 130 mg/dL Final     Comment:     Meter: [id number] : [id number] Kaushik Rebeka NA   06/11/2023 0731 196 (H) 70 - 130 mg/dL Final     Comment:     Meter: [id number] : [id number] Kaushik Rebeka NA   06/10/2023 2029 146 (H) 70 - 130 mg/dL Final     Comment:     Meter: [id number] : [id number] [place] NA   06/10/2023 1716 114 70 - 130 mg/dL Final     Comment:     Meter: [id number] : [id number] Kaushik Rebeka NA   06/10/2023 1243 226 (H) 70 - 130 mg/dL Final     Comment:     Meter: [id number] : [id number] Adriana Baron NA   06/10/2023 1136 240 (H) 70 - 130 mg/dL Final     Comment:     Meter: [id number] : [id number] Kaushik Staley NA   06/09/2023 1728 68 (L) 70 - 130 mg/dL Final     Comment:     Meter: [id number] : [id number] Fiorella MENDOZA       No radiology results for the last day      Current Facility-Administered Medications:     acetaminophen (TYLENOL) tablet 650 mg, 650 mg, Oral, Q4H PRN, Stingl, Anna Kaminski MD, 650 mg at 06/10/23  2039    albuterol (PROVENTIL) nebulizer solution 0.083% 2.5 mg/3mL, 2.5 mg, Nebulization, Q6H PRN, Anna Nolasco MD    amLODIPine (NORVASC) tablet 10 mg, 10 mg, Oral, Q24H, Anna Nolasco MD, 10 mg at 06/11/23 0838    atorvastatin (LIPITOR) tablet 40 mg, 40 mg, Oral, Nightly, Anna Nolasco MD, 40 mg at 06/10/23 2015    sennosides-docusate (PERICOLACE) 8.6-50 MG per tablet 2 tablet, 2 tablet, Oral, BID, 2 tablet at 06/11/23 0839 **AND** polyethylene glycol (MIRALAX) packet 17 g, 17 g, Oral, Daily PRN **AND** bisacodyl (DULCOLAX) EC tablet 5 mg, 5 mg, Oral, Daily PRN **AND** bisacodyl (DULCOLAX) suppository 10 mg, 10 mg, Rectal, Daily PRN, Anna Nolasco MD    budesonide-formoterol (SYMBICORT) 160-4.5 MCG/ACT inhaler 2 puff, 2 puff, Inhalation, BID - RT, Soo Jaramillo APRN, 2 puff at 06/11/23 0755    bumetanide (BUMEX) injection 4 mg, 4 mg, Intravenous, Q12H, Nathaly Ayala MD    buPROPion SR (WELLBUTRIN SR) 12 hr tablet 150 mg, 150 mg, Oral, Daily, Anna Nolasco MD, 150 mg at 06/11/23 0838    dextrose (D50W) (25 g/50 mL) IV injection 25 g, 25 g, Intravenous, Q15 Min PRN, Soo Jaramillo APRN, 25 g at 06/11/23 1054    dextrose (GLUTOSE) oral gel 15 g, 15 g, Oral, Q15 Min PRN, Soo Jaramillo APRN    glucagon (GLUCAGEN) injection 1 mg, 1 mg, Intramuscular, Q15 Min PRN, Soo Jaramillo APRN    HYDROcodone-acetaminophen (NORCO)  MG per tablet 1 tablet, 1 tablet, Oral, Q6H PRN, Anna Nolasco MD, 1 tablet at 06/11/23 0556    insulin lispro (HUMALOG/ADMELOG) injection 2-7 Units, 2-7 Units, Subcutaneous, 4x Daily AC & at Bedtime, Soo Jaramillo, APRN, 4 Units at 06/11/23 1311    ipratropium-albuterol (DUO-NEB) nebulizer solution 3 mL, 3 mL, Nebulization, Q6H While Awake - RT, Anna Nolasco MD, 3 mL at 06/11/23 0753    levothyroxine (SYNTHROID, LEVOTHROID) tablet 25 mcg, 25 mcg, Oral, Q AM, Anna Nolasco MD, 25 mcg at 06/11/23  0556    melatonin tablet 3 mg, 3 mg, Oral, Nightly PRN, Anna Nolasco MD    methylPREDNISolone sodium succinate (SOLU-Medrol) injection 40 mg, 40 mg, Intravenous, Q8H, Anna Nolasco MD, 40 mg at 06/11/23 1310    nitroglycerin (NITROSTAT) SL tablet 0.4 mg, 0.4 mg, Sublingual, Q5 Min PRN, Soo Jaramillo APRN    ondansetron (ZOFRAN) tablet 4 mg, 4 mg, Oral, Q6H PRN **OR** ondansetron (ZOFRAN) injection 4 mg, 4 mg, Intravenous, Q6H PRN, Anna Nolasco MD    sodium chloride 0.9 % flush 10 mL, 10 mL, Intravenous, PRN, Medardo Puente MD       Diet  Diet: Cardiac Diets, Renal Diets; Healthy Heart (2-3 Na+); Low Potassium; Texture: Regular Texture (IDDSI 7); Fluid Consistency: Thin (IDDSI 0)    Estimated Creatinine Clearance: 57.2 mL/min (A) (by C-G formula based on SCr of 1.82 mg/dL (H)).      Radiology Reports  [facility]         Results -  Exam Date Time Procedure Performing Provider Status   5/2/23 1:02 PM CT Chest WO JEY ZAPATA CT Tech; Auth (Verified)   Notes:   (CT Chest WO) Reason For Exam: Other disorder of lung;Other disorder of lung   REPORT  EXAM: CT Chest WO    NUMBER OF IMAGES: [id number]    Examination: CT of the chest without intravenous contrast    Clinical statement: 77 years old Male with chronic shortness of breath 4 2-3 years    Technique: CT of the chest was performed from the apices of the lungs through the upper abdomen using contiguous 5 mm transaxial sections, following standard protocol. Reformations were constructed in the coronal and sagittal planes (5 mm). Images were reviewed in soft tissue, bone and lung windows.    Images were reconstructed, using maximum intensity projection (MIP) and volume rendered (3D) datasets.    Low-dose CT acquisition technique included one of following options:  1. Automated exposure control  2. Adjustment of MA and or KV according to patient's size or  3. Use of iterative reconstruction.    Comparison: No prior studies available for  comparison.    Findings:  Lung parenchyma and pleura: The tracheobronchial tree is patent. There is a 2 mm soft tissue nodule in the right lung, along the fissure margin (series 2, image 40). There are small bilateral pleural effusions with associated compressive atelectasis. There are multiple radiopaque foreign bodies of metallic density embedded in the left lung, consistent with a history of penetrating trauma. There is no pneumothorax. There is mild pleural-parenchymal thickening/scarring throughout the lungs bilaterally, right more so than left.    Thyroid: Grossly unremarkable.    Mediastinum: There are scattered prominent/enlarged mediastinal lymph nodes, with index lymph node located in the right paratracheal region, measuring 2.1 cm x 1.1 cm in size (series 2, image 21).    Chayo: There are subcentimeter calcified right hilar lymph nodes, consistent with a history of granulomatous disease. However, the chayo are suboptimally evaluated due to lack of intravenous contrast.    Heart and pericardium: The heart is enlarged. There is no pericardial effusion. There are mild coronary artery calcifications. There is dilatation of the right pulmonary artery, measuring 3.3 cm in width (series 2, image 28). There is dilatation of the left pulmonary artery, measuring 3.1 cm in width (series 2, image 26). There is dilatation of the main pulmonary artery, measuring 3.5 cm in maximum diameter (series 2, image 27).    Chest wall: There are multiple radiopaque foreign bodies metallic density embedded in the left chest wall, consistent with a history of penetrating trauma.    Axilla: No enlarged lymph nodes.    Visualized upper abdomen: The liver has a cirrhotic morphology. The remaining visualized upper abdominal organs are grossly unremarkable.    Bones: There are degenerative changes of the spine. There are chronic fracture deformities involving multiple left ribs.      Estimated Creatinine Clearance: 57.2 mL/min (A) (by C-G  formula based on SCr of 1.82 mg/dL (H)).    Assessment/Plan     Active Hospital Problems    Diagnosis  POA    **Acute on chronic congestive heart failure, unspecified heart failure type [I50.9]  Yes    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     78 y.o. male     Acute on chronic HF  - proBNP 643  - xray chest showing Cardiomegaly with pulmonary vascular congestion and pleural effusions.  [facility] CT imaging done back in May did show pleural effusion with compression atelectasis at that time  - appreciate nephrology seeing.  They have changed lasix to bumex.    -Unable to access previous echocardiograms performed at [facility].  Noted most recent 1 / 2023.  From what I gathered per his take on results he was told that he had enlarged blood vessels noted on echo which I suspect indicated pulmonary htn.   - Check transthoracic Echo ordered but he did have good images d/t his body habitus.  Echo tech recommended by protocol to use definity and pt refussed.  Discussed importance of obtaining cardiac imaging.  He said he would consider reattempt.   - TSH WNL.   - will ask cardiology to see.   - discussed importance of daily weights    Hyperkalemia/ ELBERT on CKD3  - K+ 4.9 on admit up to 6.4 with recheck 6.2 this am. Was given lokelma with repeat lab 5.7.   - nephrology consult placed 6/10.  - reconsulted 6/11  - nephrology has changed to bumex and ordered UA and renal US.  He was taking up to 800 mg ibuprofen as outpt several times a week.  He has been counseled not to use NSAIDs in the future and to use tylenol for pain.    - post void residual 10 ml.    -He is on p.o. potassium as an outpatient he states that he does not always take it as prescribed.  Does consume  multiple high potassium foods at home such as avocados,  bananas, apricots  - EKG showing tall peaked T wave likely from elevated K+.   HS Troponin T elevated at 20 with delta - 1 in setting of CKD.  No angina.    DM type 2 with peripheral neuropathy  - hold metformin   - Check A1c  -Noted hypoglycemia last night 68 with repeat glucose this am 241  - FSBS ordered.  SSI low dose coverage.    - hypoglycemia treatment per hospital protocol.   -He is on high-dose gabapentin 900 mg 3 times daily.  He does see a pain management doctor Dr Glenroy Nichols who manages this and his Norco.    HTN  -Is on amlodipine and atorvastatin combo as outpatient  -His amlodipine is likely contributing some to his peripheral edema but he does report he does wear support hose as an outpatient.  Continue for now as this is kidney friendly.     Chronic obstructive pulmonary disease / dyspnea on exertion/ right lung pulmonary nodule/ pulmonary HTN/acute hypoxic respiratory failure/ YOAN  -Based on his reports it appears that he has undergone significant pulmonary work-up to include pulmonary function testing as well as CT imaging.  Based on care everywhere records it appears that he is followed by [facility] pulmonologist Dr. Jeronimo.   - noted on this office visit on 4/14/2023 he did not tolerate LABA which caused issues with blurred vision, dry mouth.  And issues with renal incontinence.  He was then started on ProAir Respiclick and was changed to Symbicort 160/4.5.  Symbicort and nebs have been ordered.   -CT performed at [facility] facility on 5/20/2023 did show small bilateral pleural effusions with associated compression atelectasis, cardiomegaly with dilation of the pulmonary vasculature consistent with pulmonary artery hypertension, mediastinal lymphadenopathy, right pulmonary nodule ([facility] radiology recommended f/u with Fleishner criteria) and hepatic cirrhosis.  - continues to feel shoa despite diuresing.   With hx of what sounds like  pulmonary htn and continue hypoxia Will ask pulmonary to see in am.   -  RVP negative.   - hx of aditya but has not been able to afford PAP.  Is on nocturnal 02.     Hypothyroidism:   - TSH normal.   -Continue current home dose of levothyroxine    Constipation  - adjust bowel regimin  - abd exam benign, no nausea or vomiting.  + flatus.     Pancytopenia:   - plt count is trending up 98,000  - hgb 12  - WBC 2.89 (3.46)  - repeat CBC with diff in am.        SCD's for DVT prophylaxis    Discussed with patient and nursing staff    Discharge: To be determined based on clinical course.        MARGUERITE Barba  [facility]    Electronically signed by Soo Jaramillo APRN at 23 1903       Soo Jaramillo APRN at 06/10/23 110              Name: Brandon Alan ADMIT: 2023   : 1945  PCP: Wong Cuellar MD    MRN: [id number] LOS: 0 days   AGE/SEX: 78 y.o. male  ROOM: [facility]     Subjective   Subjective   He reports increased shortness of breath over the last few weeks.  He has had ongoing issues with off-and-on shortness of breath at home since last fall.  Has been to multiple providers including pulmonologist for evaluation.  He states he was initiated on some inhalers but is unsure of the names of them.  One of the inhalers he was unable to tolerate due to increased urinary incontinence and urgency with it.  He states over the last few weeks he has had increased swelling to lower extremities and then the last few days noted significant orthopnea.  He has a history of obstructive sleep apnea but is currently not on PAP therapy due to financial issues.  He was started on nasal cannula O2 at night at 2 L to assist with his nocturnal hypoxemia about 6 weeks ago.  He states he drinks large amount of water at home, Over several liters a day.  Does not have a scale at home.    Review of systems:  General: Denies fever or chills.  Appetite good  Cardiovascular: Denies chest pain.   Positive orthopnea  Respiratory: Denies cough, + HAILE  GI: Denies nausea or vomiting or abdominal pain    Objective   Objective   Vital Signs  Temp:  [96.9 °F (36.1 °C)-97.6 °F (36.4 °C)] 96.9 °F (36.1 °C)  Heart Rate:  [51-77] 64  Resp:  [16-22] 20  BP: ()/(44-83) 93/44  SpO2:  [90 %-97 %] 93 %  on  Flow (L/min):  [2-3] 3;   Device (Oxygen Therapy): nasal cannula  Body mass index is 48.8 kg/m².    Physical Exam  Vitals and nursing note reviewed.   Constitutional:       General: He is not in acute distress.     Appearance: He is obese. He is ill-appearing (Chronically ill-appearing).   Eyes:      General: No scleral icterus.     Conjunctiva/sclera: Conjunctivae normal.   Cardiovascular:      Rate and Rhythm: Normal rate and regular rhythm.      Pulses: Normal pulses.      Heart sounds: Normal heart sounds. No murmur heard.  Pulmonary:      Effort: No respiratory distress.      Breath sounds: Normal breath sounds.      Comments: Mild conversational dyspnea noted  Abdominal:      General: Bowel sounds are normal. There is distension.      Tenderness: There is no abdominal tenderness. There is no guarding.      Comments: Obese   Musculoskeletal:      Right lower leg: Edema present.      Left lower leg: Edema present.      Comments: 2+ edema up just past his knees   Skin:     General: Skin is warm and dry.      Capillary Refill: Capillary refill takes less than 2 seconds.   Neurological:      General: No focal deficit present.      Mental Status: He is alert and oriented to person, place, and time. Mental status is at baseline.   Psychiatric:         Mood and Affect: Mood normal.         Behavior: Behavior normal.         Thought Content: Thought content normal.         Judgment: Judgment normal.         Results Review  I reviewed the patient's new clinical results.  Results from last 7 days   Lab Units 06/10/23  0439 06/09/23  1431   WBC 10*3/mm3 2.89* 3.46   HEMOGLOBIN g/dL 12.0* 12.0*   PLATELETS 10*3/mm3 98*  85*     Results from last 7 days   Lab Units 06/10/23  0631 06/10/23  0439 06/09/23  1431   SODIUM mmol/L  --  131* 138   POTASSIUM mmol/L 6.2* 6.4* 4.9   CHLORIDE mmol/L  --  98 104   CO2 mmol/L  --  24.1 26.2   BUN mg/dL  --  45* 33*   CREATININE mg/dL  --  1.88* 1.39*   GLUCOSE mg/dL  --  241* 96     Lab Results   Component Value Date    ANIONGAP 8.9 06/10/2023     Estimated Creatinine Clearance: 54.9 mL/min (A) (by C-G formula based on SCr of 1.88 mg/dL (H)).    Results from last 7 days   Lab Units 06/09/23  1431   ALBUMIN g/dL 2.9*   BILIRUBIN mg/dL 0.5   ALK PHOS U/L 74   AST (SGOT) U/L 33   ALT (SGPT) U/L 22     Results from last 7 days   Lab Units 06/10/23  0439 06/09/23  1431   CALCIUM mg/dL 8.0* 8.4*   ALBUMIN g/dL  --  2.9*       Glucose   Date/Time Value Ref Range Status   06/09/2023 1728 68 (L) 70 - 130 mg/dL Final     Comment:     Meter: [id number] : [id number] Barros Fernando KELLY       XR Chest 2 View    Result Date: 6/9/2023  No focal pulmonary consolidation. Cardiomegaly with pulmonary vascular congestion with minimal pleural effusions.  This report was finalized on 6/9/2023 4:05 PM by Dr. Sudeep Boothe M.D.         Current Facility-Administered Medications:     acetaminophen (TYLENOL) tablet 650 mg, 650 mg, Oral, Q4H PRN, Anna Nolasco MD    albuterol (PROVENTIL) nebulizer solution 0.083% 2.5 mg/3mL, 2.5 mg, Nebulization, Q6H PRN, Anna Nolasco MD    amLODIPine (NORVASC) tablet 10 mg, 10 mg, Oral, Q24H, Anna Nolasco MD, 10 mg at 06/10/23 1000    atorvastatin (LIPITOR) tablet 40 mg, 40 mg, Oral, Nightly, Anna Nolasco MD    sennosides-docusate (PERICOLACE) 8.6-50 MG per tablet 2 tablet, 2 tablet, Oral, BID, 2 tablet at 06/10/23 1000 **AND** polyethylene glycol (MIRALAX) packet 17 g, 17 g, Oral, Daily PRN **AND** bisacodyl (DULCOLAX) EC tablet 5 mg, 5 mg, Oral, Daily PRN **AND** bisacodyl (DULCOLAX) suppository 10 mg, 10 mg, Rectal, Daily PRN, Gaurav,  Anna Kaminski MD    buPROPion SR (WELLBUTRIN SR) 12 hr tablet 150 mg, 150 mg, Oral, Daily, Anna Nolasco MD, 150 mg at 06/10/23 1000    dextrose (D50W) (25 g/50 mL) IV injection 25 g, 25 g, Intravenous, Q15 Min PRN, Soo Jaramillo APRN    dextrose (GLUTOSE) oral gel 15 g, 15 g, Oral, Q15 Min PRN, Soo Jaramillo APRN    furosemide (LASIX) injection 40 mg, 40 mg, Intravenous, Q12H, Anna Nolasco MD, 40 mg at 06/10/23 1000    gabapentin (NEURONTIN) capsule 300 mg, 300 mg, Oral, TID, Anna Nolasco MD, 300 mg at 06/10/23 1000    glucagon (GLUCAGEN) injection 1 mg, 1 mg, Intramuscular, Q15 Min PRN, Soo Jaramillo APRN    HYDROcodone-acetaminophen (NORCO)  MG per tablet 1 tablet, 1 tablet, Oral, Q6H PRN, Anna Nolasco MD, 1 tablet at 06/10/23 0053    insulin lispro (HUMALOG/ADMELOG) injection 2-7 Units, 2-7 Units, Subcutaneous, 4x Daily AC & at Bedtime, Soo Jaramillo APRN    ipratropium-albuterol (DUO-NEB) nebulizer solution 3 mL, 3 mL, Nebulization, Q6H While Awake - RT, Anna Nolasco MD, 3 mL at 06/10/23 0717    levothyroxine (SYNTHROID, LEVOTHROID) tablet 25 mcg, 25 mcg, Oral, Q AM, Anna Nolasco MD, 25 mcg at 06/10/23 0608    melatonin tablet 3 mg, 3 mg, Oral, Nightly PRN, Anna Nolasco MD    methylPREDNISolone sodium succinate (SOLU-Medrol) injection 40 mg, 40 mg, Intravenous, Q8H, Anna Nolasco MD, 40 mg at 06/10/23 0608    nitroglycerin (NITROSTAT) SL tablet 0.4 mg, 0.4 mg, Sublingual, Q5 Min PRN, Soo Jaramillo APRN    ondansetron (ZOFRAN) tablet 4 mg, 4 mg, Oral, Q6H PRN **OR** ondansetron (ZOFRAN) injection 4 mg, 4 mg, Intravenous, Q6H PRN, Anna Nolasco MD    sodium chloride 0.9 % flush 10 mL, 10 mL, Intravenous, PRN, Medardo Puente MD    sodium zirconium cyclosilicate (LOKELMA) pack 10 g, 10 g, Oral, Once, Soo Jaramillo APRN       Diet  Diet: Cardiac Diets; Healthy Heart (2-3 Na+); Texture:  Regular Texture (IDDSI 7); Fluid Consistency: Thin (IDDSI 0)    Estimated Creatinine Clearance: 54.9 mL/min (A) (by C-G formula based on SCr of 1.88 mg/dL (H)).      Radiology Reports  [facility]         Results -  Exam Date Time Procedure Performing Provider Status   5/2/23 1:02 PM CT Chest WO RAY, JEY CT Tech; Auth (Verified)   Notes:   (CT Chest WO) Reason For Exam: Other disorder of lung;Other disorder of lung   REPORT  EXAM: CT Chest WO    NUMBER OF IMAGES: [id number]    Examination: CT of the chest without intravenous contrast    Clinical statement: 77 years old Male with chronic shortness of breath 4 2-3 years    Technique: CT of the chest was performed from the apices of the lungs through the upper abdomen using contiguous 5 mm transaxial sections, following standard protocol. Reformations were constructed in the coronal and sagittal planes (5 mm). Images were reviewed in soft tissue, bone and lung windows.    Images were reconstructed, using maximum intensity projection (MIP) and volume rendered (3D) datasets.    Low-dose CT acquisition technique included one of following options:  1. Automated exposure control  2. Adjustment of MA and or KV according to patient's size or  3. Use of iterative reconstruction.    Comparison: No prior studies available for comparison.    Findings:  Lung parenchyma and pleura: The tracheobronchial tree is patent. There is a 2 mm soft tissue nodule in the right lung, along the fissure margin (series 2, image 40). There are small bilateral pleural effusions with associated compressive atelectasis. There are multiple radiopaque foreign bodies of metallic density embedded in the left lung, consistent with a history of penetrating trauma. There is no pneumothorax. There is mild pleural-parenchymal thickening/scarring throughout the lungs bilaterally, right more so than left.    Thyroid: Grossly unremarkable.    Mediastinum: There are scattered prominent/enlarged mediastinal lymph nodes, with  index lymph node located in the right paratracheal region, measuring 2.1 cm x 1.1 cm in size (series 2, image 21).    Chayo: There are subcentimeter calcified right hilar lymph nodes, consistent with a history of granulomatous disease. However, the chayo are suboptimally evaluated due to lack of intravenous contrast.    Heart and pericardium: The heart is enlarged. There is no pericardial effusion. There are mild coronary artery calcifications. There is dilatation of the right pulmonary artery, measuring 3.3 cm in width (series 2, image 28). There is dilatation of the left pulmonary artery, measuring 3.1 cm in width (series 2, image 26). There is dilatation of the main pulmonary artery, measuring 3.5 cm in maximum diameter (series 2, image 27).    Chest wall: There are multiple radiopaque foreign bodies metallic density embedded in the left chest wall, consistent with a history of penetrating trauma.    Axilla: No enlarged lymph nodes.    Visualized upper abdomen: The liver has a cirrhotic morphology. The remaining visualized upper abdominal organs are grossly unremarkable.    Bones: There are degenerative changes of the spine. There are chronic fracture deformities involving multiple left ribs.      Estimated Creatinine Clearance: 54.9 mL/min (A) (by C-G formula based on SCr of 1.88 mg/dL (H)).    Assessment/Plan     Active Hospital Problems    Diagnosis  POA    **Acute on chronic congestive heart failure, unspecified heart failure type [I50.9]  Yes    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     78 y.o. male     Acute on chronic HF  - proBNP 643  - xray chest showing Cardiomegaly with pulmonary vascular congestion and pleural effusions.  [facility] CT imaging  done back in May did show pleural effusion with compression atelectasis at that time  - continue diuresing with Lasix 40 mg BID.  Will defer further diuretic adjustments to nephrology given his current renal function.   -Unable to access previous echocardiograms performed at [facility].  Noted most recent 1 / 2023.  From what I gathered per his take on results he was told that he had enlarged blood vessels noted on echo.  - Check transthoracic Echo given this latest decompensation feel its warranted.  -Check TSH    Hyperkalemia/ CKD3  - K+ 4.9 on admit up to 6.4 with recheck 6.2 this am.   -Give Lokelma one time dose.      - nephrology consult  - lasix 40 mg BID IV.   -He is on p.o. potassium as an outpatient he states that he does not always take it as prescribed.  Does consume multiple high potassium foods at home such as avocados,  bananas, apricots  - EKG showing tall peaked T wave likely from elevated K+.   HS Troponin T elevated at 20 with delta - 1 in setting of CKD.  No angina.   -Check postvoid residual.  He states he is not putting out much urine since his last Lasix dose.    DM type 2 with peripheral neuropathy  - hold metformin   - Check A1c  -Noted hypoglycemia last night 68 with repeat glucose this am 241  - FSBS ordered.  SSI low dose coverage.    - hypoglycemia treatment per hospital protocol.   -He is on high-dose gabapentin 900 mg 3 times daily.  He does see a pain management doctor Dr Glenroy Nichols who manages this and his Norco.    HTN  -Is on amlodipine and atorvastatin combo as outpatient  -His amlodipine is likely contributing some to his peripheral edema but he does report he does wear support hose as an outpatient.  Continue for now as this is kidney friendly.     Chronic obstructive pulmonary disease / dyspnea on exertion/ right lung pulmonary nodule/ pulmonary HTN:  -Based on his reports it appears that he has undergone significant pulmonary work-up to include pulmonary function testing as well  as CT imaging.  Based on care everywhere records it appears that he is followed by [facility] pulmonologist Dr. Jeronimo.   -Aced on this office visit on 2023 he did not tolerate a in OR oh which caused issues with blurred vision, dry mouth.  And issues with renal incontinence.  He was then started on ProAir Respiclick and was changed to Symbicort 160/4.5  -CT performed at [facility] facility on 2023 did show small bilateral pleural effusions with associated compression atelectasis, cardiomegaly with dilation of the pulmonary vasculature consistent with pulmonary artery hypertension, mediastinal lymphadenopathy, right pulmonary nodule ([facility] radiology recommended f/u with Fleishner criteria) and hepatic cirrhosis.    Hypothyroidism:   -Check TSH and reflex free T4  -Continue current home dose of levothyroxine    SCD's for DVT prophylaxis    Discussed with patient and nursing staff    Discharge: To be determined based on clinical course.  We will need further diuresis before we initiate any PT or OT.    MARGUERITE Barba  [facility]    Electronically signed by Soo Jaramillo APRN at 06/10/23 1443          Consult Notes (last 72 hours)        Serafin Swartz MD at 23 0609        Consult Orders    1. Inpatient Cardiology Consult [[id number]] ordered by Soo Jaramillo APRN at 23 1851                 Date of Hospital Visit: 23  Encounter Provider: Serafin Swartz MD  Place of Service: [facility] CARDIOLOGY  Patient Name: Brandon Alan  :1945  Referral Provider: Anna Nolasco, *    Chief complaint  Shortness Of Breath    History of Present Illness:    Brandon Alan is a 77 yo with COPD on 2L of home O2, hypertension, YOAN (no CPAP) and diabetes who presented to the ED on  with increasing shortness of breath, orthopnea and swelling of his lower extremities.  Initial oxygen saturations were 92% on 2L.  He has become increasingly  short of breath over several months and has seen pulmonology at [facility] where he was started on home oxygen and inhalers. Per review of records, he drinks several liters of water a day.  He was admitted to telemetry with hypoxic respiratory failure secondary to volume overload.    On arrival, patient had ELBERT with creatinine up to 2.05, now is down trended to 1.21.  High-sensitivity troponins negative x3.  Initial CXR showed cardiomegaly with pulmonary vascular congestion.       Nephrology is managing diuretics.  He is currently on 3L NC.      ECHO 6-11-23    The study is technically suboptimal for diagnosis.    Left ventricle not well visualized. Segmental wall motion cannot be commented on as a left ventricle is not well visualized. Ejection fraction cannot be determined. Patient did not want Definity      Past Medical History:   Diagnosis Date    Depression     Diabetes mellitus     Hypertension        History reviewed. No pertinent surgical history.    Medications Prior to Admission   Medication Sig Dispense Refill Last Dose    albuterol sulfate  (90 Base) MCG/ACT inhaler Inhale 2 puffs Every 4 (Four) Hours As Needed for Wheezing or Shortness of Air. 1 inhaler 0 6/8/2023    amLODIPine-atorvastatin (CADUET) 10-40 MG per tablet Take 1 tablet by mouth Daily.   6/9/2023    buPROPion SR (WELLBUTRIN SR) 150 MG 12 hr tablet Take 1 tablet by mouth Daily.   6/9/2023    gabapentin (NEURONTIN) 300 MG capsule Take 1 capsule by mouth 3 (Three) Times a Day.   6/9/2023 at 0600    HYDROcodone-acetaminophen (NORCO)  MG per tablet Take 1 tablet by mouth Every 6 (Six) Hours As Needed for Moderate Pain.   6/8/2023    levothyroxine (SYNTHROID, LEVOTHROID) 25 MCG tablet Take 1 tablet by mouth Daily.   6/9/2023    meloxicam (MOBIC) 15 MG tablet Take 1 tablet by mouth Daily.   6/9/2023    metFORMIN (GLUCOPHAGE) 850 MG tablet Take 1 tablet by mouth 2 (Two) Times a Day With Meals.   6/9/2023 at 0700       Current Meds  Scheduled  "Meds:amLODIPine, 10 mg, Oral, Q24H  atorvastatin, 40 mg, Oral, Nightly  budesonide-formoterol, 2 puff, Inhalation, BID - RT  bumetanide, 4 mg, Intravenous, Q12H  buPROPion SR, 150 mg, Oral, Daily  insulin lispro, 2-7 Units, Subcutaneous, 4x Daily AC & at Bedtime  ipratropium-albuterol, 3 mL, Nebulization, Q6H While Awake - RT  lactulose, 10 g, Oral, TID  levothyroxine, 25 mcg, Oral, Q AM  methylPREDNISolone sodium succinate, 40 mg, Intravenous, Q8H  senna-docusate sodium, 2 tablet, Oral, BID      Continuous Infusions:   PRN Meds:.  acetaminophen    albuterol    senna-docusate sodium **AND** polyethylene glycol **AND** bisacodyl **AND** bisacodyl    dextrose    dextrose    glucagon (human recombinant)    HYDROcodone-acetaminophen    melatonin    nitroglycerin    ondansetron **OR** ondansetron    sodium chloride    Allergies as of 06/09/2023 - Reviewed 06/09/2023   Allergen Reaction Noted    Penicillins  03/27/2016       Social History     Socioeconomic History    Marital status: Single   Tobacco Use    Smoking status: Never   Vaping Use    Vaping Use: Never used   Substance and Sexual Activity    Alcohol use: No    Drug use: No    Sexual activity: Defer       History reviewed. No pertinent family history.    REVIEW OF SYSTEMS:   12 point ROS was performed and is negative except as outlined in HPI          Objective:   Temp:  [97.7 °F (36.5 °C)-98.6 °F (37 °C)] 98.4 °F (36.9 °C)  Heart Rate:  [81-95] 90  Resp:  [18] 18  BP: (117-148)/(70-74) 148/73  Body mass index is 48.02 kg/m².  Flowsheet Rows      Flowsheet Row First Filed Value   Admission Height 188 cm (74\") Documented at 06/09/2023 1734   Admission Weight 172 kg (380 lb 1.6 oz) Documented at 06/10/2023 0500          Vitals:    06/12/23 0708   BP: 148/73   Pulse: 90   Resp: 18   Temp: 98.4 °F (36.9 °C)   SpO2: 91%       GEN: no distress, alert and oriented  HEENT: NACT, EOMI, moist mucous membranes  Lungs: decreased bibasilar breath sounds  CV: normal rate, " regular rhythm, normal S1, S2, 2/6 systolic murmur in RUSB, +2 radial pulses b/l  Abdomen: soft, nontender, nondistended, NABS  Extremities: 1-2+ edema b/l  Skin: no rash, warm, dry  Heme/Lymph: no bruising  Psych: organized thought, normal behavior and affect      Lab Review:   Results from last 7 days   Lab Units 06/12/23  0703 06/11/23  1306 06/11/23  0432   SODIUM mmol/L 137   < > 134*   POTASSIUM mmol/L 5.1   < > 6.2*   CHLORIDE mmol/L 96*   < > 101   CO2 mmol/L 32.1*   < > 26.0   BUN mg/dL 43*   < > 60*   CREATININE mg/dL 1.21   < > 2.05*   CALCIUM mg/dL 9.0   < > 7.8*   BILIRUBIN mg/dL  --   --  0.3   ALK PHOS U/L  --   --  101   ALT (SGPT) U/L  --   --  19   AST (SGOT) U/L  --   --  24   GLUCOSE mg/dL 239*   < > 270*    < > = values in this interval not displayed.     Results from last 7 days   Lab Units 06/10/23  0049 06/09/23  2253 06/09/23  1431   HSTROP T ng/L 20* 21* 21*     Results from last 7 days   Lab Units 06/12/23  0453 06/10/23  0439 06/09/23  1431   WBC 10*3/mm3 3.81 2.89* 3.46   HEMOGLOBIN g/dL 12.4* 12.0* 12.0*   HEMATOCRIT % 38.4 37.0* 36.1*   PLATELETS 10*3/mm3 117* 98* 85*             EKG 5-3-19      EKG 6-10-23          I personally viewed and interpreted the patient's EKG/Telemetry data)      Acute on chronic congestive heart failure, unspecified heart failure type    DM type 2 (diabetes mellitus, type 2)    Obesity    YOAN and COPD overlap syndrome    CKD (chronic kidney disease) stage 3, GFR 30-59 ml/min    Hyperkalemia    Assessment and Plan:  #Volume overload  #Hypoxic respiratory failure  #Systolic murmur  #COPD  #Hypertension  #YOAN  #Diabetes    79 yo with COPD on 2L of home O2, hypertension, YOAN (no CPAP) and diabetes who presented to the ED on 6/9 with increasing shortness of breath, found to be volume overloaded on exam and with acute hypoxic respiratory failure     Echocardiogram was technically difficult and patient refused Definity.    Discussed importance of evaluating LV  function with patient and he will think about agreeing to Definity.  He also has a systolic murmur which he notes is new.  Aortic valve was difficult to visualize and Doppler tracing does not appear accurate.    Retinae has significantly improved with diuresis.  He remains volume overloaded on exam.    - Continue diuretics per nephrology  - Patient will let us know once he agrees to Definity and we will repeat echocardiogram at that time for further evaluation of LV function    Serfain Swartz MD  23  08:13 EDT.                    Electronically signed by Serafin Swartz MD at 23 0938       Nathaly Ayala MD at 23 1047        Consult Orders    1. Inpatient Nephrology Consult [[id number]] ordered by Soo Jaramillo APRN at 23 0857                   [facility] Consult Note      Patient Name: Brandon Alan  : 1945  MRN: [id number]  Primary Care Physician:  Wong Cuellar MD  Referring Physician: Anna Nolasco MD  Date of admission: 2023    Subjective     Reason for Consult: Acute kidney injury    HPI:   Brandon Alan is a 78 y.o. male with past medical history of hypertension, diabetes mellitus type 2 and morbid obesity as well as history of COPD/restrictive lung disease who came into the hospital because of increasing shortness of air.  His creatinine at time of admission was 1.3 mg/dL and has worsened up to 2 mg/dL with a potassium of 6.2 today.  We were consulted to help with management of acute kidney injury and hyperkalemia.  Patient reported shortness of air on exertion.  He is laying down in bed and appears to be dyspneic by simple activities.  His family on bedside.  Denied prior history of kidney failure in the past.  He does use [facility] as an outpatient for arthritis.  Denies history of kidney stones or hematuria.    Review of Systems:   14 point review of systems is otherwise negative except for mentioned above on HPI    Personal  History     Past Medical History:   Diagnosis Date    Depression     Diabetes mellitus     Hypertension        History reviewed. No pertinent surgical history.    Family History: family history is not on file.    Social History:  reports that he has never smoked. He does not have any smokeless tobacco history on file. He reports that he does not drink alcohol and does not use drugs.    Home Medications:  Prior to Admission medications    Medication Sig Start Date End Date Taking? Authorizing Provider   albuterol sulfate  (90 Base) MCG/ACT inhaler Inhale 2 puffs Every 4 (Four) Hours As Needed for Wheezing or Shortness of Air. 5/3/19  Yes Maribell Peterson APRN   amLODIPine-atorvastatin (CADUET) 10-40 MG per tablet Take 1 tablet by mouth Daily.   Yes Mu Garcia MD   buPROPion SR (WELLBUTRIN SR) 150 MG 12 hr tablet Take 1 tablet by mouth Daily.   Yes Mu Garcia MD   gabapentin (NEURONTIN) 300 MG capsule Take 1 capsule by mouth 3 (Three) Times a Day. 3/13/23  Yes Mu Garcia MD   HYDROcodone-acetaminophen (NORCO)  MG per tablet Take 1 tablet by mouth Every 6 (Six) Hours As Needed for Moderate Pain. 5/18/23  Yes Mu Garcia MD   levothyroxine (SYNTHROID, LEVOTHROID) 25 MCG tablet Take 1 tablet by mouth Daily. 4/11/23  Yes Mu Garcia MD   meloxicam (MOBIC) 15 MG tablet Take 1 tablet by mouth Daily.   Yes Mu Garcia MD   metFORMIN (GLUCOPHAGE) 850 MG tablet Take 1 tablet by mouth 2 (Two) Times a Day With Meals.   Yes Mu Garcia MD       Allergies:  Allergies   Allergen Reactions    Penicillins        Objective     Vitals:   Temp:  [97.4 °F (36.3 °C)-98.6 °F (37 °C)] 98.4 °F (36.9 °C)  Heart Rate:  [70-77] 76  Resp:  [18-20] 18  BP: ()/(47-70) 117/70  Flow (L/min):  [2-3] 3    Intake/Output Summary (Last 24 hours) at 6/11/2023 1048  Last data filed at 6/11/2023 1030  Gross per 24 hour   Intake 910 ml   Output 1425 ml   Net  -515 ml       Physical Exam:    General Appearance: alert, oriented x 3, no acute distress   Skin: warm and dry  HEENT: oral mucosa normal, nonicteric sclera  Neck: supple, no JVD  Lungs: CTA  Heart: RRR, normal S1 and S2  Abdomen: soft, nontender, nondistended  : no palpable bladder  Extremities: ++ edema, cyanosis or clubbing  Neuro: normal speech and mental status     Scheduled Meds:     amLODIPine, 10 mg, Oral, Q24H  atorvastatin, 40 mg, Oral, Nightly  budesonide-formoterol, 2 puff, Inhalation, BID - RT  buPROPion SR, 150 mg, Oral, Daily  insulin lispro, 2-7 Units, Subcutaneous, 4x Daily AC & at Bedtime  ipratropium-albuterol, 3 mL, Nebulization, Q6H While Awake - RT  levothyroxine, 25 mcg, Oral, Q AM  methylPREDNISolone sodium succinate, 40 mg, Intravenous, Q8H  senna-docusate sodium, 2 tablet, Oral, BID      IV Meds:        Results Reviewed:   I have personally reviewed the results from the time of this admission to 6/11/2023 10:48 EDT     Lab Results   Component Value Date    GLUCOSE 270 (H) 06/11/2023    CALCIUM 7.8 (L) 06/11/2023     (L) 06/11/2023    K 6.2 (C) 06/11/2023    CO2 26.0 06/11/2023     06/11/2023    BUN 60 (H) 06/11/2023    CREATININE 2.05 (H) 06/11/2023    EGFRIFNONA 90 05/03/2019    BCR 29.3 (H) 06/11/2023    ANIONGAP 7.0 06/11/2023      Lab Results   Component Value Date    MG 2.0 03/28/2016    ALBUMIN 3.5 06/11/2023           Assessment / Plan     ASSESSMENT:    Acute kidney injury : Baseline creatinine is 0.9 mg/dL it was noted to be 1.3 mg/dL on admission and today is 2 mg/dL associated with hyperkalemia.  He might be related to NSAID use as an outpatient versus ATN due to hypotension.  Will obtain urinalysis and urine electrolytes.  We will check bladder scan postvoid residual.  We will treat his hyperkalemia medically and repeat lab to ensure improvement of his potassium.  Will adjust all medications for creatinine clearance less than 10 mm/min/m².  Please avoid Mobic and  all other nephrotoxic medications.  Hyperkalemia due to ELBERT and exogenous potassium intake and probably NSAIDs he was.  Treat medically and repeat labs showed improvement of potassium  Probable acute on chronic congestive heart failure.  Pending echocardiogram.  Unable to access his last echocardiogram result.  We will start patient on Bumex 4 mg IV twice a day.  Morbid obesity  History of COPD:   Hypothyroidism    PLAN:    We will obtain UA urine electrolytes and renal ultrasound as well as bladder scan postvoid residual  Hold nephrotoxic medications including NSAIDs  Awaiting echocardiogram but in the meanwhile we will start patient on Bumex 4 mg IV twice a day  Medical treatment for hyperkalemia and repeat labs ensure improvement of potassium  Surveillance labs    Thank you for involving us in the care of Brandon Alan.  Please feel free to call with any questions.    Nathaly Ayala MD  06/11/23  10:48 EDT    [facility]  [phone]    Electronically signed by Nathaly Ayala MD at 06/11/23 7970

## 2023-06-13 NOTE — PROGRESS NOTES
[facility] Progress Note      Patient Name: Brandon Alan  : 1945  MRN: [id number]  Primary Care Physician:  Wong Cuellar MD  Date of admission: 2023    Subjective     Interval History:   Follow up ELBERT.  Feeling better. Weight down.  On o2 2 liters at home and here. Eating well.  Up with PT this am.  Desats, but recovers.  DW Dr. Swartz.  [facility] echo with EF 55% , no real comment on diastolic dysfunction.  5.9 liters UOP with 1 mg IV bumex.       Review of Systems:   As noted above    Objective     Vitals:   Temp:  [97.7 °F (36.5 °C)-98.3 °F (36.8 °C)] 97.7 °F (36.5 °C)  Heart Rate:  [85-95] 90  Resp:  [16-18] 16  BP: (129-158)/(71-79) 151/79  Flow (L/min):  [2-3] 2    Intake/Output Summary (Last 24 hours) at 2023 0918  Last data filed at 2023 0904  Gross per 24 hour   Intake 760 ml   Output 4650 ml   Net -3890 ml       Physical Exam:    General Appearance: alert, oriented x 3, no acute distress . Morbidly obese  Skin: warm and dry  HEENT: oral mucosa poor dentition.  Nasal o2,  nonicteric sclera  Neck: supple, no JVD  Lungs: Clear to auscultation.   Heart: RRR, normal S1 and S2  Abdomen: soft, nontender, obese. Body wall edema.   : no palpable bladder  Extremities: 1+ lower ext edema.   Neuro: normal speech and mental status     Scheduled Meds:     amLODIPine, 10 mg, Oral, Q24H  atorvastatin, 40 mg, Oral, Nightly  budesonide-formoterol, 2 puff, Inhalation, BID - RT  bumetanide, 1 mg, Intravenous, Q12H  buPROPion SR, 150 mg, Oral, Daily  insulin lispro, 2-7 Units, Subcutaneous, 4x Daily AC & at Bedtime  ipratropium-albuterol, 3 mL, Nebulization, Q6H While Awake - RT  lactulose, 10 g, Oral, TID  levothyroxine, 25 mcg, Oral, Q AM  methylPREDNISolone sodium succinate, 40 mg, Intravenous, Q8H  senna-docusate sodium, 2 tablet, Oral, BID      IV Meds:        Results Reviewed:   I have personally reviewed the results from the time of this admission to 2023  09:18 EDT     Results from last 7 days   Lab Units 06/13/23  0410 06/12/23  0703 06/11/23  1306 06/11/23  0432 06/10/23  0439 06/09/23  1431   SODIUM mmol/L 136 137 138 134*   < > 138   POTASSIUM mmol/L 5.2 5.1 5.7* 6.2*   < > 4.9   CHLORIDE mmol/L 95* 96* 102 101   < > 104   CO2 mmol/L 36.6* 32.1* 26.3 26.0   < > 26.2   BUN mg/dL 29* 43* 55* 60*   < > 33*   CREATININE mg/dL 1.06 1.21 1.82* 2.05*   < > 1.39*   CALCIUM mg/dL 8.9 9.0 8.8 7.8*   < > 8.4*   BILIRUBIN mg/dL  --   --   --  0.3  --  0.5   ALK PHOS U/L  --   --   --  101  --  74   ALT (SGPT) U/L  --   --   --  19  --  22   AST (SGOT) U/L  --   --   --  24  --  33   GLUCOSE mg/dL 202* 239* 277* 270*   < > 96    < > = values in this interval not displayed.       Estimated Creatinine Clearance: 94.9 mL/min (by C-G formula based on SCr of 1.06 mg/dL).    Results from last 7 days   Lab Units 06/13/23  0410   MAGNESIUM mg/dL 2.0   PHOSPHORUS mg/dL 2.6             Results from last 7 days   Lab Units 06/13/23  0410 06/12/23  0453 06/10/23  0439 06/09/23  1431   WBC 10*3/mm3 3.02* 3.81 2.89* 3.46   HEMOGLOBIN g/dL 12.8* 12.4* 12.0* 12.0*   PLATELETS 10*3/mm3 79* 117* 98* 85*             Assessment / Plan     ASSESSMENT:  ELBERT, Acute on chronic heart failure ( no definitive echo  here).  Volume excess  Improving with diuresis. Creatinine improved.  K up a little with glucose 202.    2. Probable acute on chronic heart failure with systolic m.  Echo difficult.  No real information from it and declined Definity . Dw Dr. Swartz [facility] with EF 55%.    3. COPD on home o2.  YONA  4. DM2  5. TCP chronic. 131 K in 2019.   6. Hypothyroid on replacement.   7.  HTN better controlled.   PLAN:  Change to po diuretic today.  2. DW Dr. Maxime Hoffman MD  06/13/23  09:18 EDT    [facility]  [phone]

## 2023-06-13 NOTE — THERAPY EVALUATION
Patient Name: Brandon Alan  : 1945    MRN: [id number]                              Today's Date: 2023       Admit Date: 2023    Visit Dx:     ICD-10-CM ICD-9-CM   1. Acute on chronic congestive heart failure, unspecified heart failure type  I50.9 428.0   2. Hypoxia  R09.02 799.02   3. ELBERT (acute kidney injury)  N17.9 584.9   4. Acute exacerbation of chronic obstructive pulmonary disease (COPD)  J44.1 491.21   5. Elevated troponin  R77.8 790.6   6. Anemia, unspecified type  D64.9 285.9     Patient Active Problem List   Diagnosis    Weakness    Bronchitis    DM type 2 (diabetes mellitus, type 2)    Hypertension    Hyponatremia    Obesity    Acute on chronic congestive heart failure, unspecified heart failure type    YOAN and COPD overlap syndrome    CKD (chronic kidney disease) stage 3, GFR 30-59 ml/min    Hyperkalemia     Past Medical History:   Diagnosis Date    Depression     Diabetes mellitus     Hypertension      History reviewed. No pertinent surgical history.   General Information       Row Name 23 0852          Physical Therapy Time and Intention    Document Type evaluation  -ST     Mode of Treatment individual therapy;physical therapy  -ST       Row Name 23 0852          General Information    Patient Profile Reviewed yes  -ST     Prior Level of Function independent:  -ST     Existing Precautions/Restrictions fall;oxygen therapy device and L/min  -ST       Row Name 23 0852          Living Environment    People in Home child(amelia), adult  -ST       Row Name 23 0852          Home Main Entrance    Number of Stairs, Main Entrance three  -ST     Stair Railings, Main Entrance railings safe and in good condition  -ST       Row Name 23 0852          Stairs Within Home, Primary    Number of Stairs, Within Home, Primary none  -ST       Row Name 23 0852          Cognition    Orientation Status (Cognition) oriented x 3  -ST       Row Name 23 0852          Safety  Issues, Functional Mobility    Safety Issues Affecting Function (Mobility) safety precaution awareness  -ST     Impairments Affecting Function (Mobility) endurance/activity tolerance  -ST     Comment, Safety Issues/Impairments (Mobility) gait belt, nonskid socks donned  -ST               User Key  (r) = Recorded By, (t) = Taken By, (c) = Cosigned By      Initials Name Provider Type    Rebeka Hargrove PT Physical Therapist                   Mobility       Row Name 06/13/23 0852          Bed Mobility    Bed Mobility supine-sit;sit-supine  -ST     Supine-Sit [place] (Bed Mobility) supervision  -ST     Sit-Supine [place] (Bed Mobility) supervision  -ST     Assistive Device (Bed Mobility) head of bed elevated  -ST       Row Name 06/13/23 0852          Sit-Stand Transfer    Sit-Stand [place] (Transfers) standby assist  -ST     Assistive Device (Sit-Stand Transfers) walker, front-wheeled  -ST       Row Name 06/13/23 0852          Gait/Stairs (Locomotion)    [place] Level (Gait) verbal cues;standby assist  -ST     Assistive Device (Gait) walker, front-wheeled  -     Distance in Feet (Gait) 20'  -ST     Deviations/Abnormal Patterns (Gait) base of support, wide;gait speed decreased;stride length decreased  -ST     Bilateral Gait Deviations forward flexed posture  -ST     [place] Level (Stairs) not tested  -ST     Comment, (Gait/Stairs) fatigues with task, de-sat to 87%, recover to 92% within 45 sec  -ST               User Key  (r) = Recorded By, (t) = Taken By, (c) = Cosigned By      Initials Name Provider Type    Rebeka Hargrove PT Physical Therapist                   Obj/Interventions       Row Name 06/13/23 0853          Range of Motion Comprehensive    Comment, General Range of Motion bilat LE WFL  -ST       Row Name 06/13/23 0853          Strength Comprehensive (MMT)    Comment, General Manual Muscle Testing (MMT) Assessment bilat LE WFL, grossly 4/5  -ST       Row Name 06/13/23  0853          Balance    Comment, Balance SBA for dynamic balance within room, no LOB noted  -ST               User Key  (r) = Recorded By, (t) = Taken By, (c) = Cosigned By      Initials Name Provider Type    Rebeka Hargrove PT Physical Therapist                   Goals/Plan       Row Name 06/13/23 0854          Transfer Goal 1 (PT)    Activity/Assistive Device (Transfer Goal 1, PT) sit-to-stand/stand-to-sit;bed-to-chair/chair-to-bed  -ST     [place] Level/Cues Needed (Transfer Goal 1, PT) modified independence  -ST     Time Frame (Transfer Goal 1, PT) 1 week  -ST     Progress/Outcome (Transfer Goal 1, PT) new goal  -ST       Row Name 06/13/23 0854          Gait Training Goal 1 (PT)    Activity/Assistive Device (Gait Training Goal 1, PT) gait (walking locomotion);assistive device use  -ST     [place] Level (Gait Training Goal 1, PT) modified independence  -ST     Distance (Gait Training Goal 1, PT) 100'  -ST     Time Frame (Gait Training Goal 1, PT) 1 week  -ST     Progress/Outcome (Gait Training Goal 1, PT) new goal  -ST               User Key  (r) = Recorded By, (t) = Taken By, (c) = Cosigned By      Initials Name Provider Type    Rebeka Hargrove PT Physical Therapist                   Clinical Impression       Row Name 06/13/23 0875          Pain    Pre/Posttreatment Pain Comment reports some bilat LE pain, but no pain behaviors noted, does not rate pain  -ST     Pain Intervention(s) Repositioned;Ambulation/increased activity  -ST       Row Name 06/13/23 0818          Plan of Care Review    Plan of Care Reviewed With patient  -ST     Outcome Evaluation Pt is 79 y/o M admitted to [facility] on 6/9/23 with cough/SOB. At baseline pt is indep with mobility using cane. Pt lives with his daughter, 3 ADDIE with rail. Pt currently demos supervision with bed mobility, SBA for ambulation in room up to 20'. Pt noted to have increased work of breathing and fatigue with room ambulation. On 2 L throughout  session, does de-sat to 87% with activity but recovers to 92% with 45 sec seated rest. Pt demos mobility below baseline and therefore would benefit from acute skilled PT to address functional mobility deficits. Anticipate d/c home with assist and home PT.  -ST       Row Name 06/13/23 0854          Therapy Assessment/Plan (PT)    Rehab Potential (PT) good, to achieve stated therapy goals  -ST     Criteria for Skilled Interventions Met (PT) yes  -ST     Therapy Frequency (PT) 5 times/wk  -ST     Predicted Duration of Therapy Intervention (PT) 1 week  -ST       Row Name 06/13/23 0854          Vital Signs    O2 Delivery Pre Treatment supplemental O2  -ST     O2 Delivery Intra Treatment supplemental O2  -ST     O2 Delivery Post Treatment supplemental O2  -ST       Row Name 06/13/23 0854          Positioning and Restraints    Pre-Treatment Position in bed  -ST     Post Treatment Position bed  -ST     In Bed supine;call light within reach;encouraged to call for assist  -ST               User Key  (r) = Recorded By, (t) = Taken By, (c) = Cosigned By      Initials Name Provider Type    Rebeka Hargrove, PT Physical Therapist                   Outcome Measures       Row Name 06/13/23 0855          How much help from another person do you currently need...    Turning from your back to your side while in flat bed without using bedrails? 4  -ST     Moving from lying on back to sitting on the side of a flat bed without bedrails? 4  -ST     Moving to and from a bed to a chair (including a wheelchair)? 3  -ST     Standing up from a chair using your arms (e.g., wheelchair, bedside chair)? 4  -ST     Climbing 3-5 steps with a railing? 2  -ST     To walk in hospital room? 3  -ST     AM-PAC 6 Clicks Score (PT) 20  -ST     Highest level of mobility 6 --> Walked 10 steps or more  -ST       Row Name 06/13/23 0855          Functional Assessment    Outcome Measure Options AM-PAC 6 Clicks Basic Mobility (PT)  -ST               User Key   (r) = Recorded By, (t) = Taken By, (c) = Cosigned By      Initials Name Provider Type    ST Rebeka Shannon PT Physical Therapist                                 Physical Therapy Education       Title: PT OT SLP Therapies (In Progress)       Topic: Physical Therapy (In Progress)       Point: Mobility training (Done)       Learning Progress Summary             Patient Acceptance, E,TB, VU,NR by  at 6/13/2023 0855                         Point: Home exercise program (Not Started)       Learner Progress:  Not documented in this visit.              Point: Body mechanics (Done)       Learning Progress Summary             Patient Acceptance, E,TB, VU,NR by  at 6/13/2023 0855                         Point: Precautions (Not Started)       Learner Progress:  Not documented in this visit.                              User Key       Initials Effective Dates Name Provider Type Discipline     09/22/22 -  Rebeka Shannon PT Physical Therapist PT                  PT Recommendation and Plan     Plan of Care Reviewed With: patient  Outcome Evaluation: Pt is 77 y/o M admitted to [facility] on 6/9/23 with cough/SOB. At baseline pt is indep with mobility using cane. Pt lives with his daughter, 3 ADDIE with rail. Pt currently demos supervision with bed mobility, SBA for ambulation in room up to 20'. Pt noted to have increased work of breathing and fatigue with room ambulation. On 2 L throughout session, does de-sat to 87% with activity but recovers to 92% with 45 sec seated rest. Pt demos mobility below baseline and therefore would benefit from acute skilled PT to address functional mobility deficits. Anticipate d/c home with assist and home PT.     Time Calculation:    PT Charges       Row Name 06/13/23 0857             Time Calculation    Start Time 0827  -ST      Stop Time 0843  -ST      Time Calculation (min) 16 min  -ST      PT Received On 06/13/23  -ST      PT - Next Appointment 06/14/23  -ST      PT Goal Re-Cert Due Date  06/20/23  -ST         Time Calculation- PT    Total Timed Code Minutes- PT 0 minute(s)  -ST                User Key  (r) = Recorded By, (t) = Taken By, (c) = Cosigned By      Initials Name Provider Type    Rebeka Hargrove, PT Physical Therapist                  Therapy Charges for Today       Code Description Service Date Service Provider Modifiers Qty    [id number] HC PT EVAL MOD COMPLEXITY 2 6/13/2023 Rebeka Shannon, SAHARA GP 1            PT G-Codes  Outcome Measure Options: AM-PAC 6 Clicks Basic Mobility (PT)  AM-PAC 6 Clicks Score (PT): 20  PT Discharge Summary  Anticipated Discharge Disposition (PT): home with home health, home with assist    Rebeka Shannon, PT  6/13/2023

## 2023-06-14 ENCOUNTER — READMISSION MANAGEMENT (OUTPATIENT)
Dept: CALL CENTER | Facility: HOSPITAL | Age: 78
End: 2023-06-14
Payer: MEDICARE

## 2023-06-14 VITALS
HEIGHT: 74 IN | TEMPERATURE: 97.9 F | WEIGHT: 315 LBS | OXYGEN SATURATION: 96 % | BODY MASS INDEX: 40.43 KG/M2 | DIASTOLIC BLOOD PRESSURE: 75 MMHG | HEART RATE: 89 BPM | SYSTOLIC BLOOD PRESSURE: 127 MMHG | RESPIRATION RATE: 18 BRPM

## 2023-06-14 PROBLEM — E87.5 HYPERKALEMIA: Status: RESOLVED | Noted: 2023-06-10 | Resolved: 2023-06-14

## 2023-06-14 LAB
ALBUMIN SERPL-MCNC: 3.5 G/DL (ref 3.5–5.2)
ANION GAP SERPL CALCULATED.3IONS-SCNC: 4 MMOL/L (ref 5–15)
BUN SERPL-MCNC: 30 MG/DL (ref 8–23)
BUN/CREAT SERPL: 29.4 (ref 7–25)
CALCIUM SPEC-SCNC: 8.6 MG/DL (ref 8.6–10.5)
CHLORIDE SERPL-SCNC: 91 MMOL/L (ref 98–107)
CO2 SERPL-SCNC: 42 MMOL/L (ref 22–29)
CREAT SERPL-MCNC: 1.02 MG/DL (ref 0.76–1.27)
EGFRCR SERPLBLD CKD-EPI 2021: 75.7 ML/MIN/1.73
GLUCOSE BLDC GLUCOMTR-MCNC: 168 MG/DL (ref 70–130)
GLUCOSE BLDC GLUCOMTR-MCNC: 185 MG/DL (ref 70–130)
GLUCOSE BLDC GLUCOMTR-MCNC: 190 MG/DL (ref 70–130)
GLUCOSE SERPL-MCNC: 194 MG/DL (ref 65–99)
PHOSPHATE SERPL-MCNC: 3.4 MG/DL (ref 2.5–4.5)
POTASSIUM SERPL-SCNC: 4.1 MMOL/L (ref 3.5–5.2)
SODIUM SERPL-SCNC: 137 MMOL/L (ref 136–145)

## 2023-06-14 PROCEDURE — 63710000001 ONDANSETRON PER 8 MG: Performed by: INTERNAL MEDICINE

## 2023-06-14 PROCEDURE — 94799 UNLISTED PULMONARY SVC/PX: CPT

## 2023-06-14 PROCEDURE — 80069 RENAL FUNCTION PANEL: CPT | Performed by: INTERNAL MEDICINE

## 2023-06-14 PROCEDURE — 82948 REAGENT STRIP/BLOOD GLUCOSE: CPT

## 2023-06-14 PROCEDURE — 63710000001 PREDNISONE PER 1 MG: Performed by: NURSE PRACTITIONER

## 2023-06-14 PROCEDURE — 94761 N-INVAS EAR/PLS OXIMETRY MLT: CPT

## 2023-06-14 PROCEDURE — 94664 DEMO&/EVAL PT USE INHALER: CPT

## 2023-06-14 PROCEDURE — 63710000001 INSULIN LISPRO (HUMAN) PER 5 UNITS: Performed by: NURSE PRACTITIONER

## 2023-06-14 RX ORDER — AMOXICILLIN 250 MG
1 CAPSULE ORAL DAILY
Qty: 30 TABLET | Refills: 0 | Status: SHIPPED | OUTPATIENT
Start: 2023-06-14

## 2023-06-14 RX ORDER — TORSEMIDE 20 MG/1
40 TABLET ORAL DAILY
Status: DISCONTINUED | OUTPATIENT
Start: 2023-06-15 | End: 2023-06-14 | Stop reason: HOSPADM

## 2023-06-14 RX ORDER — TORSEMIDE 20 MG/1
40 TABLET ORAL DAILY
Qty: 60 TABLET | Refills: 0 | Status: SHIPPED | OUTPATIENT
Start: 2023-06-14

## 2023-06-14 RX ORDER — PREDNISONE 20 MG/1
40 TABLET ORAL
Qty: 4 TABLET | Refills: 0 | Status: SHIPPED | OUTPATIENT
Start: 2023-06-15 | End: 2023-06-17

## 2023-06-14 RX ORDER — FLUTICASONE FUROATE AND VILANTEROL 100; 25 UG/1; UG/1
1 POWDER RESPIRATORY (INHALATION) DAILY
Qty: 28 EACH | Refills: 0 | Status: SHIPPED | OUTPATIENT
Start: 2023-06-14

## 2023-06-14 RX ADMIN — ONDANSETRON HYDROCHLORIDE 4 MG: 4 TABLET, FILM COATED ORAL at 12:10

## 2023-06-14 RX ADMIN — HYDROCODONE BITARTRATE AND ACETAMINOPHEN 1 TABLET: 10; 325 TABLET ORAL at 05:54

## 2023-06-14 RX ADMIN — PREDNISONE 40 MG: 20 TABLET ORAL at 08:45

## 2023-06-14 RX ADMIN — AMLODIPINE BESYLATE 10 MG: 10 TABLET ORAL at 08:45

## 2023-06-14 RX ADMIN — INSULIN LISPRO 2 UNITS: 100 INJECTION, SOLUTION INTRAVENOUS; SUBCUTANEOUS at 12:10

## 2023-06-14 RX ADMIN — BUPROPION HYDROCHLORIDE 150 MG: 150 TABLET, EXTENDED RELEASE ORAL at 08:45

## 2023-06-14 RX ADMIN — LEVOTHYROXINE SODIUM 25 MCG: 0.03 TABLET ORAL at 05:52

## 2023-06-14 RX ADMIN — IPRATROPIUM BROMIDE AND ALBUTEROL SULFATE 3 ML: .5; 3 SOLUTION RESPIRATORY (INHALATION) at 07:15

## 2023-06-14 RX ADMIN — BUDESONIDE AND FORMOTEROL FUMARATE DIHYDRATE 2 PUFF: 160; 4.5 AEROSOL RESPIRATORY (INHALATION) at 07:21

## 2023-06-14 RX ADMIN — TORSEMIDE 40 MG: 20 TABLET ORAL at 08:45

## 2023-06-14 RX ADMIN — INSULIN LISPRO 2 UNITS: 100 INJECTION, SOLUTION INTRAVENOUS; SUBCUTANEOUS at 08:44

## 2023-06-14 RX ADMIN — DOCUSATE SODIUM 50MG AND SENNOSIDES 8.6MG 2 TABLET: 8.6; 5 TABLET, FILM COATED ORAL at 08:44

## 2023-06-14 NOTE — NURSING NOTE
.Diuretic in Use: torsemide  Response to Diuretics (Output greater than intake): yes  Daily Weight (up or down): down  O2 Requirements: 1-2l NC(wears O2 at home)  Functional Status (Activity level, tolerance and respiratory symptoms): up with walker independently in room  Discharge Plans:  home today

## 2023-06-14 NOTE — CASE MANAGEMENT/SOCIAL WORK
Case Management Discharge Note      Final Note: Home with family, daughter to transport    Provided Post Acute Provider List?: N/A  Provided Post Acute Provider Quality & Resource List?: N/A    Selected Continued Care - Discharged on 6/14/2023 Admission date: 6/9/2023 - Discharge disposition: Home or Self Care      Destination    No services have been selected for the patient.                Durable Medical Equipment    No services have been selected for the patient.                Dialysis/Infusion    No services have been selected for the patient.                Home Medical Care    No services have been selected for the patient.                Therapy    No services have been selected for the patient.                Community Resources    No services have been selected for the patient.                Community & DME    No services have been selected for the patient.                    Transportation Services  Private: Car    Final Discharge Disposition Code: 01 - home or self-care

## 2023-06-14 NOTE — NURSING NOTE
Daughter here. Discharge instructions discussed patient and family. IV removed. Tele removed. Transport requested.

## 2023-06-14 NOTE — NURSING NOTE
Discharge instructions printed but patient wants to wait until daughter is here to review. IV removed. Daughter will be here at 2pm for pick-up.

## 2023-06-14 NOTE — DISCHARGE SUMMARY
Patient Name: Brandon Alan  : 1945  MRN: [id number]    Date of Admission: 2023  Date of Discharge:  2023  Primary Care Physician: Wong Cuellar MD      Chief Complaint:   Shortness of Breath      Discharge Diagnoses     Active Hospital Problems    Diagnosis  POA   • **Acute on chronic congestive heart failure, unspecified heart failure type [I50.9]  Yes   • YOAN and COPD overlap syndrome [G47.33, J44.9]  Yes   • CKD (chronic kidney disease) stage 3, GFR 30-59 ml/min [N18.30]  Yes   • Obesity [E66.9]  Yes   • DM type 2 (diabetes mellitus, type 2) [E11.9]  Yes      Resolved Hospital Problems    Diagnosis Date Resolved POA   • Hyperkalemia [E87.5] 2023 Yes        Hospital Course     Mr. Alan is a 78 y.o. male with a history of DM, HTN, YOAN, CKD, obesity, chronic CHF, COPD who presented to [facility] initially complaining of soa.  Please see the admitting history and physical for further details.  He was found to have volume overload and was admitted to the hospital for further evaluation and treatment.  Cardiology and Nephrology have followed. Started on IV diuresis, managed by Nephrology due to  ELBERT on arrival. Cr was 1.39 on arrival, worsening up to 2 with hyperkalemia. IV diuresis and treatment of hyperkalemia was given. Urine studies were collected. NSAIDS & gabapentin were held. Underwent Echo but study was technically difficult and pt declined Definity. Prior Echo reviewed by Cardiology that showed normal EF, minimal aortic valve gradients, ruling out HFrEF and aortic stenosis. He will follow up with his outpatient Cardiologist. Diuretics have been transitioned to po w/recommendation for torsemide 40 mg po daily and repeat BMP & CBC on 23. He was treated w/steroids for COPD w/possible exacerbation. BG trends have been elevated requiring correctional scale insulin. A1C 5.50%. Nephrology recommends to not continue metformin at discharge; follow up with PCP  for continued diabetes management. Sats are stable on home dose O2. He is also to remain off NSAIDS (mobic) and gabapentin. Cr & electrolytes are stable at discharge. Platelets have also been lower than baseline without evidence of bleeding, follow up with PCP for repeat labs as previously stated. Medically stable to discharge home.  Day of Discharge     Subjective:  Feeling better overall. Sats stable on O2 2L NC. LE edema improved. Denies soa, chest pain. Agrees with discharge home    Physical Exam:  Temp:  [97.7 °F (36.5 °C)-97.9 °F (36.6 °C)] 97.9 °F (36.6 °C)  Heart Rate:  [83-91] 89  Resp:  [16-18] 18  BP: (119-143)/(73-80) 127/75  Body mass index is 44.47 kg/m².  Physical Exam  Vitals and nursing note reviewed.   Constitutional:       General: He is not in acute distress.     Appearance: He is obese. He is ill-appearing. He is not toxic-appearing.   HENT:      Head: Normocephalic.      Mouth/Throat:      Mouth: Mucous membranes are moist.   Eyes:      Conjunctiva/sclera: Conjunctivae normal.   Cardiovascular:      Rate and Rhythm: Normal rate and regular rhythm.   Pulmonary:      Effort: Pulmonary effort is normal. No respiratory distress.      Breath sounds: No wheezing or rales.   Abdominal:      General: Bowel sounds are normal.      Palpations: Abdomen is soft.   Musculoskeletal:      Cervical back: Neck supple.      Right lower leg: Edema present.      Left lower leg: Edema present.      Comments: Trace   Skin:     General: Skin is warm and dry.   Neurological:      Mental Status: He is alert and oriented to person, place, and time.   Psychiatric:         Mood and Affect: Mood normal.         Behavior: Behavior normal.         Consultants     Consult Orders (all) (From admission, onward)     Start     Ordered    06/12/23 0702  Inpatient Cardiology Consult  IN AM        Specialty:  Cardiology  Provider:  James Bucio Jr., MD    06/11/23 1851    06/12/23 0702  Inpatient Pulmonology Consult  IN AM,    Status:  Canceled        Specialty:  Pulmonary Disease  Provider:  Matt Baez MD    06/11/23 1856    06/12/23 0702  Inpatient Pulmonology Consult  IN AM        Specialty:  Pulmonary Disease  Provider:  Matt Baez MD    06/11/23 1858    06/11/23 0830  Inpatient Nephrology Consult  STAT        Specialty:  Nephrology  Provider:  Chente Ahn MD    06/11/23 0829    06/11/23 0822  Inpatient Nephrology Consult  Once,   Status:  Canceled        Specialty:  Nephrology  Provider:  Chente Ahn MD    06/11/23 0821    06/10/23 0836  Inpatient Nephrology Consult  Once        Specialty:  Nephrology  Provider:  Chente Ahn MD    06/10/23 0836    06/09/23 1847  Inpatient Case Management  Consult  Once        Provider:  (Not yet assigned)    06/09/23 1848    06/09/23 1603  [facility] (on-call MD unless specified) Details  Once        Specialty:  Hospitalist  Provider:  (Not yet assigned)    06/09/23 1602              Procedures     * Surgery not found *      Imaging Results (All)     Procedure Component Value Units Date/Time    US Renal Bilateral [[id number]] Collected: 06/11/23 1437     Updated: 06/11/23 1442    Narrative:      US RENAL BILATERAL-     Clinical: Acute renal insufficiency     FINDINGS: The right kidney is 12.2 cm in length and left kidney is 12.8  cm in length.     Due to the patient's body habitus, the left kidney could only vaguely be  demonstrated. No hydronephrosis or gross calculus seen. The overall  renal cortical echotexture is within normal limits.     The right kidney is normal. No calculus or obstructive uropathy nor  mass. The cortical echotexture is within normal limits.     Limited bladder survey. The bladder was near collapse. Neither the right  or left ureteral jet could be documented.     This report was finalized on 6/11/2023 2:39 PM by Dr. Dick Delgado M.D.       XR Chest 2 View [[id number]] Collected: 06/09/23 1602     Updated: 06/09/23 1608     Narrative:      XR CHEST 2 VW-     HISTORY: Male who is 78 years-old,  short of breath     TECHNIQUE: Frontal and lateral views of the chest     COMPARISON: 05/02/2019     FINDINGS:     The heart is enlarged. Pulmonary vasculature is congested. No focal  pulmonary consolidation. Minimal pleural effusions are suggested. No  pneumothorax. Otherwise stable.       Impression:      No focal pulmonary consolidation. Cardiomegaly with  pulmonary vascular congestion with minimal pleural effusions.     This report was finalized on 6/9/2023 4:05 PM by Dr. Sudeep Boothe M.D.             Results for orders placed during the hospital encounter of 06/09/23    Adult Transthoracic Echo Complete W/ Cont if Necessary Per Protocol    Interpretation Summary  •  The study is technically suboptimal for diagnosis.  •  Left ventricle not well visualized. Segmental wall motion cannot be commented on as a left ventricle is not well visualized. Ejection fraction cannot be determined. Patient did not want Definity    Pertinent Labs     Results from last 7 days   Lab Units 06/13/23 0410 06/12/23 0453 06/10/23  0439 06/09/23  1431   WBC 10*3/mm3 3.02* 3.81 2.89* 3.46   HEMOGLOBIN g/dL 12.8* 12.4* 12.0* 12.0*   PLATELETS 10*3/mm3 79* 117* 98* 85*     Results from last 7 days   Lab Units 06/14/23 0313 06/13/23  0410 06/12/23  0703 06/11/23  1306   SODIUM mmol/L 137 136 137 138   POTASSIUM mmol/L 4.1 5.2 5.1 5.7*   CHLORIDE mmol/L 91* 95* 96* 102   CO2 mmol/L 42.0* 36.6* 32.1* 26.3   BUN mg/dL 30* 29* 43* 55*   CREATININE mg/dL 1.02 1.06 1.21 1.82*   GLUCOSE mg/dL 194* 202* 239* 277*   EGFR mL/min/1.73 75.7 72.3 61.7 37.8*     Results from last 7 days   Lab Units 06/14/23 0313 06/13/23  0410 06/11/23  0432 06/09/23  1431   ALBUMIN g/dL 3.5 3.4* 3.5 2.9*   BILIRUBIN mg/dL  --   --  0.3 0.5   ALK PHOS U/L  --   --  101 74   AST (SGOT) U/L  --   --  24 33   ALT (SGPT) U/L  --   --  19 22     Results from last 7 days   Lab Units  06/14/23  0313 06/13/23  0410 06/12/23  0703 06/11/23  1306 06/11/23  0432 06/10/23  0439 06/09/23  1431   CALCIUM mg/dL 8.6 8.9 9.0 8.8 7.8*   < > 8.4*   ALBUMIN g/dL 3.5 3.4*  --   --  3.5  --  2.9*   MAGNESIUM mg/dL  --  2.0  --   --   --   --   --    PHOSPHORUS mg/dL 3.4 2.6  --   --   --   --   --     < > = values in this interval not displayed.       Results from last 7 days   Lab Units 06/10/23  0049 06/09/23  2253 06/09/23  1431 06/09/23  1404   HSTROP T ng/L 20* 21* 21*  --    PROBNP pg/mL  --   --  643.0  --    D DIMER QUANT MCGFEU/mL  --   --   --  1.94*     Results from last 7 days   Lab Units 06/11/23  1143   SODIUM UR mmol/L 35   CREATININE UR mg/dL 97.7   CHLORIDE UR mmol/L 33   PROTEIN TOTAL URINE mg/dL 7.8   PROT/CREAT RATIO UR mg/G Crea 79.8         Invalid input(s): LDLCALC      Results from last 7 days   Lab Units 06/09/23  1522   COVID19  Not Detected       Test Results Pending at Discharge       Discharge Details        Discharge Medications      New Medications      Instructions Start Date   Fluticasone Furoate-Vilanterol 100-25 MCG/ACT aerosol powder   Commonly known as: Breo Ellipta   1 puff, Inhalation, Daily      predniSONE 20 MG tablet  Commonly known as: DELTASONE   40 mg, Oral, Daily With Breakfast   Start Date: Sandy 15, 2023     sennosides-docusate 8.6-50 MG per tablet  Commonly known as: PERICOLACE   1 tablet, Oral, Daily      torsemide 20 MG tablet  Commonly known as: DEMADEX   40 mg, Oral, Daily         Continue These Medications      Instructions Start Date   albuterol sulfate  (90 Base) MCG/ACT inhaler  Commonly known as: PROVENTIL HFA;VENTOLIN HFA;PROAIR HFA   2 puffs, Inhalation, Every 4 Hours PRN      amLODIPine-atorvastatin 10-40 MG per tablet  Commonly known as: CADUET   1 tablet, Oral, Daily      buPROPion  MG 12 hr tablet  Commonly known as: WELLBUTRIN SR   150 mg, Oral, Daily      HYDROcodone-acetaminophen  MG per tablet  Commonly known as: NORCO   1  tablet, Oral, Every 6 Hours PRN      levothyroxine 25 MCG tablet  Commonly known as: SYNTHROID, LEVOTHROID   1 tablet, Oral, Daily         Stop These Medications    gabapentin 300 MG capsule  Commonly known as: NEURONTIN     meloxicam 15 MG tablet  Commonly known as: MOBIC     metFORMIN 850 MG tablet  Commonly known as: GLUCOPHAGE            Allergies   Allergen Reactions   • Penicillins        Discharge Disposition:  Home or Self Care      Discharge Diet:  Diet Order   Procedures   • Diet: Cardiac Diets, Renal Diets, Fluid Restriction (240 mL/tray) Diets; Healthy Heart (2-3 Na+); Low Potassium; 1500 mL/day; Texture: Regular Texture (IDDSI 7); Fluid Consistency: Thin (IDDSI 0)       Discharge Activity:   Activity Instructions     Activity as Tolerated            CODE STATUS:    Code Status and Medical Interventions:   Ordered at: 06/09/23 1741     Code Status (Patient has no pulse and is not breathing):    CPR (Attempt to Resuscitate)     Medical Interventions (Patient has pulse or is breathing):    Full       No future appointments.   Follow-up Information     [facility] .    Specialty: Cardiology  Contact information:  [address]  [facility] [address] [phone]           Serafin Swartz MD. Schedule an appointment as soon as possible for a visit in 1 month(s).    Specialty: Cardiology  Contact information:  [address] [address] [phone]             Dipti Kay MD. Go on 6/21/2023.    Specialty: Family Medicine  Why: APPOINTMENT ON WEDNESDAY 6/21/23 AT 10 AM. **please arrive 15 minutes early and make sure to have your ID and insurance card.**    Need BMP and [facility]  Contact information:  [address] [facility] [address] [phone]                         Time Spent on Discharge:  Greater than 30 minutes      MARGUERITE Peralta  [facility]  06/14/23  13:32 EDT

## 2023-06-14 NOTE — PROGRESS NOTES
"    Patient Name: Brandon Alan  :1945  78 y.o.      Patient Care Team:  Wong Cuellar MD as PCP - General (Family Medicine)    Chief Complaint: Feels about the same.  SOB    Interval History:   NAEO.     Objective   Vital Signs  Temp:  [97.7 °F (36.5 °C)-97.9 °F (36.6 °C)] 97.9 °F (36.6 °C)  Heart Rate:  [83-91] 89  Resp:  [16-18] 18  BP: (119-143)/(65-80) 127/75    Intake/Output Summary (Last 24 hours) at 2023 0826  Last data filed at 2023 0555  Gross per 24 hour   Intake 360 ml   Output 7250 ml   Net -6890 ml     Flowsheet Rows      Flowsheet Row First Filed Value   Admission Height 188 cm (74\") Documented at 2023 1734   Admission Weight 172 kg (380 lb 1.6 oz) Documented at 06/10/2023 0500            GEN: no distress, alert and oriented  HEENT: NACT, EOMI, moist mucous membranes, nasal cannula in place, poor dentition  Lungs: CTAB, no wheezes, rales or rhonchi  CV: normal rate, regular rhythm, normal S1, S2, 2/6 systolic murmur, +2 radial pulses b/l  Abdomen: soft, nontender, nondistended, NABS  Extremities: 1+ edema  Skin: no rash, warm, dry  Heme/Lymph: no bruising  Psych: organized thought, normal behavior and affect    Results Review:    Results from last 7 days   Lab Units 23  0313   SODIUM mmol/L 137   POTASSIUM mmol/L 4.1   CHLORIDE mmol/L 91*   CO2 mmol/L 42.0*   BUN mg/dL 30*   CREATININE mg/dL 1.02   GLUCOSE mg/dL 194*   CALCIUM mg/dL 8.6     Results from last 7 days   Lab Units 06/10/23  0049 23  2253 23  1431   HSTROP T ng/L 20* 21* 21*     Results from last 7 days   Lab Units 23  0410   WBC 10*3/mm3 3.02*   HEMOGLOBIN g/dL 12.8*   HEMATOCRIT % 38.3   PLATELETS 10*3/mm3 79*         Results from last 7 days   Lab Units 23  0410   MAGNESIUM mg/dL 2.0                   Medication Review:   amLODIPine, 10 mg, Oral, Q24H  atorvastatin, 40 mg, Oral, Nightly  budesonide-formoterol, 2 puff, Inhalation, BID - RT  buPROPion SR, 150 mg, Oral, " Daily  insulin lispro, 2-9 Units, Subcutaneous, 4x Daily AC & at Bedtime  ipratropium-albuterol, 3 mL, Nebulization, Q6H While Awake - RT  lactulose, 10 g, Oral, TID  levothyroxine, 25 mcg, Oral, Q AM  predniSONE, 40 mg, Oral, Daily With Breakfast  senna-docusate sodium, 2 tablet, Oral, BID  torsemide, 40 mg, Oral, BID              Assessment & Plan   #Volume overload  #Acute Hypoxic respiratory failure  #Systolic murmur  #Hyperkalemia  #COPD  #Hypertension  #YOAN  #Diabetes     77 yo with COPD on 2L of home O2, hypertension, YOAN (no CPAP) and diabetes who presented to the ED on 6/9 with increasing shortness of breath, found to be volume overloaded on exam and with acute hypoxic respiratory failure      Echocardiogram was technically difficult and patient refused Definity.     Discussed importance of evaluating LV function with patient and he will think about agreeing to Definity.  He also has a systolic murmur which he notes is new.  Aortic valve was difficult to visualize and Doppler tracing does not appear accurate. [facility] notes TDS with EF 50-55%, aortic valve difficult to visualize with mean gradient 4 mmHg.      Creatinine remained stable.  He has residual lower extremity edema but does not have any increased oxygen requirement at this time.    - Continue diuretics per nephrology  - Given prior echo with normal EF and minimal aortic valve gradients, HFrEF and aortic stenosis have been ruled out.     Stable for discharge from cardiac standpoint with outpatient follow-up at his outpatient cardiologist.    Serafin Swartz MD  [facility]  06/14/23  08:26 EDT

## 2023-06-14 NOTE — PLAN OF CARE
Goal Outcome Evaluation:   Diuretic in Use: torsemide  Response to Diuretics (Output greater than intake): output greater  Daily Weight (up or down): down  O2 Requirements: 1 - 2 LPM  Functional Status (Activity level, tolerance and respiratory symptoms): up with walker + standby assist  Discharge Plans:  possibly home tomorrow. Whether oxygen will be needed is not yet known.

## 2023-06-14 NOTE — NURSING NOTE
"CWOCN- assessed bilateral ears. Left ear blanching where O2 tubing sits. Right ear with a small callused, dry wound. Patient states: That started when we were wearing masks so much with Covid and now I wear oxygen and it hasn't gotten better.\" He has been wearing oxygen for about a month, he says. I recommend that he apply vaseline to protect the skin from pressure and friction. I am not sure what O2 tubing he has at home but [facility] has very soft, flexible tubing so he could take that with him if he wanted.   Patient verbalized understanding. He says yes, it often is tender and has been for \"a while\".   Discussed with nurse manager.   "

## 2023-06-14 NOTE — PROGRESS NOTES
[facility] Progress Note      Patient Name: Brandon Alan  : 1945  MRN: [id number]  Primary Care Physician:  Wong Cuellar MD  Date of admission: 2023    Subjective     Interval History:   Follow up ELBERT.  Feeling better. Weight down. 7 liters uop on oral torsemide.    Breathing much better.  Bowels moving.  Eating.     Review of Systems:   As noted above    Objective     Vitals:   Temp:  [97.7 °F (36.5 °C)-97.9 °F (36.6 °C)] 97.9 °F (36.6 °C)  Heart Rate:  [83-91] 89  Resp:  [16-18] 18  BP: (119-143)/(65-80) 127/75  Flow (L/min):  [1-2] 2    Intake/Output Summary (Last 24 hours) at 2023 0856  Last data filed at 2023 0848  Gross per 24 hour   Intake 660 ml   Output 7250 ml   Net -6590 ml         Physical Exam:    General Appearance: alert, oriented x 3, no acute distress . Morbidly obese  Skin: warm and dry  HEENT: oral mucosa poor dentition.  Nasal o2,  nonicteric sclera  Neck: supple, no JVD  Lungs: Clear to auscultation.   Heart: RRR, normal S1 and S2  Abdomen: soft, nontender, obese. Body wall edema.   : no palpable bladder  Extremities: trace lower ext edema.   Neuro: normal speech and mental status     Scheduled Meds:     amLODIPine, 10 mg, Oral, Q24H  atorvastatin, 40 mg, Oral, Nightly  budesonide-formoterol, 2 puff, Inhalation, BID - RT  buPROPion SR, 150 mg, Oral, Daily  insulin lispro, 2-9 Units, Subcutaneous, 4x Daily AC & at Bedtime  ipratropium-albuterol, 3 mL, Nebulization, Q6H While Awake - RT  lactulose, 10 g, Oral, TID  levothyroxine, 25 mcg, Oral, Q AM  predniSONE, 40 mg, Oral, Daily With Breakfast  senna-docusate sodium, 2 tablet, Oral, BID  torsemide, 40 mg, Oral, BID      IV Meds:        Results Reviewed:   I have personally reviewed the results from the time of this admission to 2023 08:56 EDT     Results from last 7 days   Lab Units 23  0313 23  0410 23  0703 23  1306 23  0432 06/10/23  0439  06/09/23  1431   SODIUM mmol/L 137 136 137   < > 134*   < > 138   POTASSIUM mmol/L 4.1 5.2 5.1   < > 6.2*   < > 4.9   CHLORIDE mmol/L 91* 95* 96*   < > 101   < > 104   CO2 mmol/L 42.0* 36.6* 32.1*   < > 26.0   < > 26.2   BUN mg/dL 30* 29* 43*   < > 60*   < > 33*   CREATININE mg/dL 1.02 1.06 1.21   < > 2.05*   < > 1.39*   CALCIUM mg/dL 8.6 8.9 9.0   < > 7.8*   < > 8.4*   BILIRUBIN mg/dL  --   --   --   --  0.3  --  0.5   ALK PHOS U/L  --   --   --   --  101  --  74   ALT (SGPT) U/L  --   --   --   --  19  --  22   AST (SGOT) U/L  --   --   --   --  24  --  33   GLUCOSE mg/dL 194* 202* 239*   < > 270*   < > 96    < > = values in this interval not displayed.         Estimated Creatinine Clearance: 96.1 mL/min (by C-G formula based on SCr of 1.02 mg/dL).    Results from last 7 days   Lab Units 06/14/23  0313 06/13/23  0410   MAGNESIUM mg/dL  --  2.0   PHOSPHORUS mg/dL 3.4 2.6               Results from last 7 days   Lab Units 06/13/23  0410 06/12/23  0453 06/10/23  0439 06/09/23  1431   WBC 10*3/mm3 3.02* 3.81 2.89* 3.46   HEMOGLOBIN g/dL 12.8* 12.4* 12.0* 12.0*   PLATELETS 10*3/mm3 79* 117* 98* 85*               Assessment / Plan     ASSESSMENT:  ELBERT, Acute on chronic heart failure ( no definitive echo  here).  On NSAID at home .Volume excess improving with diuresis. Creatinine improved.  K normal.  Significant urine output even after change to oral diuretic.   Metabolic alkalosis.  Reduce diuretic.   2. Probable acute on chronic heart failure with systolic m.  Echo difficult.  No real information from it and declined Definity . Dw Dr. Swartz yesterday.  [facility] echo with EF 55%.    3. COPD on home o2.  YOAN  4. DM2  5. TCP chronic. 131 K in 2019.   6. Hypothyroid on replacement.   7.  HTN much better controlled.   PLAN:  Reduce torsemide to daily.   2. NO MOBIC at home.  3. DO not resume metformin.  4. Would not resume gabapentin due to side effect of edema.    5. Needs BMP Monday June 19 at PCP office.   Tia Pierson  MD Yasmin  06/14/23  08:56 EDT    [facility]  [phone]

## 2023-06-14 NOTE — PLAN OF CARE
Goal Outcome Evaluation:   Patient alert and oriented. No complaints voiced. Appetite and fluid intake adequate. Fluid restriction education discussed and has been compliant this am. New order for discharge. Will complete paperwork and remove IV. Patient stated his family would be picking him. Vital signs stable. Call light in reach. Safety maintained.   Diuretic in Use: Torsemide 40mg daily   Response to Diuretics (Output greater than intake): output greater than intake  Daily Weight (up or down): down  O2 Requirements: 1-2L   Functional Status (Activity level, tolerance and respiratory symptoms): up ad keira, independent with care  Discharge Plans: discharge to home.

## 2023-06-15 NOTE — OUTREACH NOTE
Prep Survey      Flowsheet Row Responses   [facility] facility patient discharged from? [place]   Is LACE score < 7 ? No   Eligibility Readm Mgmt   Discharge diagnosis *Acute on chronic congestive heart failure, unspecified heart failure type (I50.9)   Does the patient have one of the following disease processes/diagnoses(primary or secondary)? CHF   Does the patient have Home health ordered? No   Is there a DME ordered? No   Prep survey completed? Yes            ROSANNE HAN - Registered Nurse

## 2023-06-19 ENCOUNTER — READMISSION MANAGEMENT (OUTPATIENT)
Dept: CALL CENTER | Facility: HOSPITAL | Age: 78
End: 2023-06-19
Payer: MEDICARE

## 2023-06-19 NOTE — OUTREACH NOTE
CHF Week 1 Survey      Flowsheet Row Responses   [facility] [place] facility patient discharged from? [place]   Does the patient have one of the following disease processes/diagnoses(primary or secondary)? CHF   CHF Week 1 attempt successful? No   Unsuccessful attempts Attempt 1            Carlota Beltrán Registered Nurse

## 2023-07-05 NOTE — PROGRESS NOTES
Enter Query Response Below      Query Response:     Unable to determine    Electronically signed by Adelia Baez, APRN, 23, 8:35 AM EDT.           If applicable, please update the problem list.       Patient: Brandon Alan        : 1945  Account: [id number]           Admit Date: 2023        How to Respond to this query:       a. Click New Note     b. Answer query within the yellow box.                c. Update the Problem List, if applicable.      If you have any questions about this query contact me at: [email or web address]    Adelia Baez APRN    78 year old male with COPD and chronic hypoxic respiratory failure, on home O2 at 2LPM, was admitted  with volume overload/ increased shortness of air and diagnosed with acute on chronic respiratory failure with hypoxia per the progress notes beginning . The patient was initially described with shortness of breath and labored respirations on 2 LPM with sat 96%, respiratory rate 22. Treatment included supplemental oxygen 2-3 LPM with lowest recorded O2 sat 90 % (3LPM on ). No ABG was done.    After study, was acute hypoxic respiratory failure clinically supported during this admission?    - Yes, please include additional clinical indicators:____________  - No, chronic respiratory failure only  - Other- specify________  - Unable to determine        By submitting this query, we are merely seeking further clarification of documentation to accurately reflect all conditions that you are monitoring, evaluating, treating or that extend the hospitalization or utilize additional resources of care. Please utilize your independent clinical judgment when addressing the question(s) above.     This query and your response, once completed, will be entered into the legal medical record.    Sincerely,  Toyin Laughlin RN CCDS  Clinical Documentation Integrity Program

## 2023-07-05 NOTE — PROGRESS NOTES
"Enter Query Response Below      Query Response:   Fluid overload    Electronically signed by Adelia Baez, APRN, 23, 8:34 AM EDT.             If applicable, please update the problem list.       Patient: Brandon Alan        : 1945  Account: [id number]           Admit Date: 2023        How to Respond to this query:       a. Click New Note     b. Answer query within the yellow box.                c. Update the Problem List, if applicable.      If you have any questions about this query contact me at: [email or web address]     Adelia Baez APRN    78 year old male admitted  with volume overload. Echocardiogram 6/10 was reported as technically difficult and \"suboptimal for diagnosis.\" Per cardiology note , HFrEF and aortic stenosis ruled out based on previous echocardiogram 23. EF 55% at that time. \"Continue treatment for presumed HFpEF.\" The discharge summary documented acute on chronic congestive heart failure. \"Prior Echo reviewed by Cardiology that showed normal EF, minimal aortic valve gradients, ruling out HFrEF\"     Treatment: IV lasix and bumex, amlodipine, oral torsemide    Can this be clarified as:    - Acute on chronic diastolic CHF (HFpEF)  - Fluid overload only  - Other, please specify_____________  - Unable to clinically determine    By submitting this query, we are merely seeking further clarification of documentation to accurately reflect all conditions that you are monitoring, evaluating, treating or that extend the hospitalization or utilize additional resources of care. Please utilize your independent clinical judgment when addressing the question(s) above.     This query and your response, once completed, will be entered into the legal medical record.    Sincerely,  Toyin Laughlin RN CCDS  Clinical Documentation Integrity Program     "

## 2023-07-07 PROBLEM — I50.9 ACUTE ON CHRONIC CONGESTIVE HEART FAILURE, UNSPECIFIED HEART FAILURE TYPE: Status: RESOLVED | Noted: 2023-06-09 | Resolved: 2023-07-07

## 2023-07-07 PROBLEM — I50.32 CHRONIC HEART FAILURE WITH PRESERVED EJECTION FRACTION (HFPEF): Status: ACTIVE | Noted: 2023-07-07

## 2023-07-26 ENCOUNTER — PATIENT OUTREACH (OUTPATIENT)
Dept: CASE MANAGEMENT | Facility: CLINIC | Age: 78
End: 2023-07-26
Payer: MEDICARE

## 2023-07-26 NOTE — OUTREACH NOTE
"Patient Outreach    SW called and spoke to patient re: housing concerns. Patient reports he has placed \"a few\" application at various apartments in [place], but has not heard back. Patient reports he will be homeless by Friday. Patient reports he has no plan in place or anyone to stay with, at this time. SW encouraged patient to reach out to friends and family members to determine if patient could stay with them in the interim of finding new housing. Patient verbalized understanding. SW attempted to provide patient with information on [facility] to complete assessment and inform agency of new homelessness, but patient reports he is driving and has no way to write down number. SW inquired about sending information via [employer] or sending information to e-mail listed on file. Patient declined. SW to follow up on Friday to determine patient plan re: housing.     Kelly GALDAMEZ -   Ambulatory Case Management    7/26/2023, 10:46 EDT    "

## 2023-08-01 ENCOUNTER — PATIENT OUTREACH (OUTPATIENT)
Dept: CASE MANAGEMENT | Facility: CLINIC | Age: 78
End: 2023-08-01
Payer: MEDICARE

## 2023-08-04 ENCOUNTER — PATIENT OUTREACH (OUTPATIENT)
Dept: CASE MANAGEMENT | Facility: CLINIC | Age: 78
End: 2023-08-04
Payer: MEDICARE

## 2023-08-22 ENCOUNTER — PATIENT OUTREACH (OUTPATIENT)
Dept: CASE MANAGEMENT | Facility: CLINIC | Age: 78
End: 2023-08-22
Payer: MEDICARE

## 2023-08-22 NOTE — OUTREACH NOTE
Care Coordination    SW called [facility] and determined patient was Dc'd home on 8/17/2023.     Patient Outreach    SW called and spoke to patient via telephone. Patient reports doing much better since being home from rehab. Has walker for home use. Reports wanting WC, but will speak to PCP ( [facility]) re: WC at appt on Thursday 8/24. No additional concerns identified. SW to discharge.     Kelly GALDAMEZ -   Ambulatory Case Management    8/22/2023, 15:49 EDT

## 2023-09-05 ENCOUNTER — HOSPITAL ENCOUNTER (INPATIENT)
Facility: HOSPITAL | Age: 78
LOS: 5 days | Discharge: HOME | DRG: 291 | End: 2023-09-11
Attending: EMERGENCY MEDICINE | Admitting: STUDENT IN AN ORGANIZED HEALTH CARE EDUCATION/TRAINING PROGRAM
Payer: MEDICARE

## 2023-09-05 DIAGNOSIS — D64.9 ACUTE ANEMIA: ICD-10-CM

## 2023-09-05 DIAGNOSIS — J44.9 OSA AND COPD OVERLAP SYNDROME: ICD-10-CM

## 2023-09-05 DIAGNOSIS — I50.9 ACUTE ON CHRONIC CONGESTIVE HEART FAILURE, UNSPECIFIED HEART FAILURE TYPE: Primary | ICD-10-CM

## 2023-09-05 DIAGNOSIS — R60.0 PEDAL EDEMA: ICD-10-CM

## 2023-09-05 DIAGNOSIS — R77.8 ELEVATED TROPONIN: ICD-10-CM

## 2023-09-05 DIAGNOSIS — G47.33 OSA AND COPD OVERLAP SYNDROME: ICD-10-CM

## 2023-09-05 PROCEDURE — 85025 COMPLETE CBC W/AUTO DIFF WBC: CPT | Performed by: EMERGENCY MEDICINE

## 2023-09-05 PROCEDURE — 84484 ASSAY OF TROPONIN QUANT: CPT | Performed by: EMERGENCY MEDICINE

## 2023-09-05 PROCEDURE — 85730 THROMBOPLASTIN TIME PARTIAL: CPT | Performed by: EMERGENCY MEDICINE

## 2023-09-05 PROCEDURE — 80053 COMPREHEN METABOLIC PANEL: CPT | Performed by: EMERGENCY MEDICINE

## 2023-09-05 PROCEDURE — 83880 ASSAY OF NATRIURETIC PEPTIDE: CPT | Performed by: EMERGENCY MEDICINE

## 2023-09-05 PROCEDURE — 93010 ELECTROCARDIOGRAM REPORT: CPT | Performed by: INTERNAL MEDICINE

## 2023-09-05 PROCEDURE — 99285 EMERGENCY DEPT VISIT HI MDM: CPT

## 2023-09-05 PROCEDURE — 85610 PROTHROMBIN TIME: CPT | Performed by: EMERGENCY MEDICINE

## 2023-09-05 PROCEDURE — 93005 ELECTROCARDIOGRAM TRACING: CPT

## 2023-09-05 RX ORDER — SODIUM CHLORIDE 0.9 % (FLUSH) 0.9 %
10 SYRINGE (ML) INJECTION AS NEEDED
Status: DISCONTINUED | OUTPATIENT
Start: 2023-09-05 | End: 2023-09-11 | Stop reason: HOSPADM

## 2023-09-06 ENCOUNTER — APPOINTMENT (OUTPATIENT)
Dept: GENERAL RADIOLOGY | Facility: HOSPITAL | Age: 78
DRG: 291 | End: 2023-09-06
Payer: MEDICARE

## 2023-09-06 PROBLEM — N18.2 ACUTE RENAL FAILURE SUPERIMPOSED ON STAGE 2 CHRONIC KIDNEY DISEASE: Status: ACTIVE | Noted: 2023-06-10

## 2023-09-06 PROBLEM — I50.33 ACUTE ON CHRONIC DIASTOLIC (CONGESTIVE) HEART FAILURE: Status: ACTIVE | Noted: 2023-07-07

## 2023-09-06 PROBLEM — N17.9 ACUTE RENAL FAILURE SUPERIMPOSED ON STAGE 2 CHRONIC KIDNEY DISEASE: Status: ACTIVE | Noted: 2023-06-10

## 2023-09-06 PROBLEM — I50.9 ACUTE EXACERBATION OF CHF (CONGESTIVE HEART FAILURE): Status: ACTIVE | Noted: 2023-09-06

## 2023-09-06 LAB
ALBUMIN SERPL-MCNC: 3.3 G/DL (ref 3.5–5.2)
ALBUMIN SERPL-MCNC: 3.3 G/DL (ref 3.5–5.2)
ALBUMIN/GLOB SERPL: 0.8 G/DL
ALBUMIN/GLOB SERPL: 0.8 G/DL
ALP SERPL-CCNC: 148 U/L (ref 39–117)
ALP SERPL-CCNC: 150 U/L (ref 39–117)
ALT SERPL W P-5'-P-CCNC: 11 U/L (ref 1–41)
ALT SERPL W P-5'-P-CCNC: 14 U/L (ref 1–41)
ANION GAP SERPL CALCULATED.3IONS-SCNC: 13 MMOL/L (ref 5–15)
ANION GAP SERPL CALCULATED.3IONS-SCNC: 9 MMOL/L (ref 5–15)
APTT PPP: 34.3 SECONDS (ref 22.7–35.4)
AST SERPL-CCNC: 26 U/L (ref 1–40)
AST SERPL-CCNC: 27 U/L (ref 1–40)
BASOPHILS # BLD AUTO: 0.02 10*3/MM3 (ref 0–0.2)
BASOPHILS NFR BLD AUTO: 0.6 % (ref 0–1.5)
BILIRUB SERPL-MCNC: 0.5 MG/DL (ref 0–1.2)
BILIRUB SERPL-MCNC: 0.6 MG/DL (ref 0–1.2)
BUN SERPL-MCNC: 25 MG/DL (ref 8–23)
BUN SERPL-MCNC: 25 MG/DL (ref 8–23)
BUN/CREAT SERPL: 18.5 (ref 7–25)
BUN/CREAT SERPL: 18.8 (ref 7–25)
CALCIUM SPEC-SCNC: 8.4 MG/DL (ref 8.6–10.5)
CALCIUM SPEC-SCNC: 8.5 MG/DL (ref 8.6–10.5)
CHLORIDE SERPL-SCNC: 96 MMOL/L (ref 98–107)
CHLORIDE SERPL-SCNC: 98 MMOL/L (ref 98–107)
CO2 SERPL-SCNC: 30 MMOL/L (ref 22–29)
CO2 SERPL-SCNC: 32 MMOL/L (ref 22–29)
CREAT SERPL-MCNC: 1.33 MG/DL (ref 0.76–1.27)
CREAT SERPL-MCNC: 1.35 MG/DL (ref 0.76–1.27)
DACRYOCYTES BLD QL SMEAR: NORMAL
DEPRECATED RDW RBC AUTO: 42.9 FL (ref 37–54)
DEPRECATED RDW RBC AUTO: 43.3 FL (ref 37–54)
EGFRCR SERPLBLD CKD-EPI 2021: 54.1 ML/MIN/1.73
EGFRCR SERPLBLD CKD-EPI 2021: 55.1 ML/MIN/1.73
EOSINOPHIL # BLD AUTO: 0.11 10*3/MM3 (ref 0–0.4)
EOSINOPHIL # BLD MANUAL: 0.09 10*3/MM3 (ref 0–0.4)
EOSINOPHIL NFR BLD AUTO: 3.2 % (ref 0.3–6.2)
EOSINOPHIL NFR BLD MANUAL: 3 % (ref 0.3–6.2)
ERYTHROCYTE [DISTWIDTH] IN BLOOD BY AUTOMATED COUNT: 14.7 % (ref 12.3–15.4)
ERYTHROCYTE [DISTWIDTH] IN BLOOD BY AUTOMATED COUNT: 14.8 % (ref 12.3–15.4)
GEN 5 2HR TROPONIN T REFLEX: 37 NG/L
GLOBULIN UR ELPH-MCNC: 4 GM/DL
GLOBULIN UR ELPH-MCNC: 4.2 GM/DL
GLUCOSE BLDC GLUCOMTR-MCNC: 157 MG/DL (ref 70–130)
GLUCOSE BLDC GLUCOMTR-MCNC: 185 MG/DL (ref 70–130)
GLUCOSE BLDC GLUCOMTR-MCNC: 226 MG/DL (ref 70–130)
GLUCOSE SERPL-MCNC: 171 MG/DL (ref 65–99)
GLUCOSE SERPL-MCNC: 189 MG/DL (ref 65–99)
HBA1C MFR BLD: 8 % (ref 4.8–5.6)
HCT VFR BLD AUTO: 31.1 % (ref 37.5–51)
HCT VFR BLD AUTO: 31.4 % (ref 37.5–51)
HGB BLD-MCNC: 9.4 G/DL (ref 13–17.7)
HGB BLD-MCNC: 9.6 G/DL (ref 13–17.7)
IMM GRANULOCYTES # BLD AUTO: 0.01 10*3/MM3 (ref 0–0.05)
IMM GRANULOCYTES NFR BLD AUTO: 0.3 % (ref 0–0.5)
INR PPP: 1.17 (ref 0.9–1.1)
LYMPHOCYTES # BLD AUTO: 0.88 10*3/MM3 (ref 0.7–3.1)
LYMPHOCYTES # BLD MANUAL: 0.75 10*3/MM3 (ref 0.7–3.1)
LYMPHOCYTES NFR BLD AUTO: 25.9 % (ref 19.6–45.3)
LYMPHOCYTES NFR BLD MANUAL: 5 % (ref 5–12)
MCH RBC QN AUTO: 24.5 PG (ref 26.6–33)
MCH RBC QN AUTO: 24.8 PG (ref 26.6–33)
MCHC RBC AUTO-ENTMCNC: 30.2 G/DL (ref 31.5–35.7)
MCHC RBC AUTO-ENTMCNC: 30.6 G/DL (ref 31.5–35.7)
MCV RBC AUTO: 81 FL (ref 79–97)
MCV RBC AUTO: 81.1 FL (ref 79–97)
MONOCYTES # BLD AUTO: 0.3 10*3/MM3 (ref 0.1–0.9)
MONOCYTES # BLD: 0.16 10*3/MM3 (ref 0.1–0.9)
MONOCYTES NFR BLD AUTO: 8.8 % (ref 5–12)
NEUTROPHILS # BLD AUTO: 2.11 10*3/MM3 (ref 1.7–7)
NEUTROPHILS NFR BLD AUTO: 2.08 10*3/MM3 (ref 1.7–7)
NEUTROPHILS NFR BLD AUTO: 61.2 % (ref 42.7–76)
NEUTROPHILS NFR BLD MANUAL: 68 % (ref 42.7–76)
NRBC BLD AUTO-RTO: 0 /100 WBC (ref 0–0.2)
NT-PROBNP SERPL-MCNC: 1404 PG/ML (ref 0–1800)
PLATELET # BLD AUTO: 111 10*3/MM3 (ref 140–450)
PLATELET # BLD AUTO: 116 10*3/MM3 (ref 140–450)
PMV BLD AUTO: 10 FL (ref 6–12)
PMV BLD AUTO: 10.4 FL (ref 6–12)
POIKILOCYTOSIS BLD QL SMEAR: NORMAL
POLYCHROMASIA BLD QL SMEAR: NORMAL
POTASSIUM SERPL-SCNC: 4.3 MMOL/L (ref 3.5–5.2)
POTASSIUM SERPL-SCNC: 4.6 MMOL/L (ref 3.5–5.2)
PROT SERPL-MCNC: 7.3 G/DL (ref 6–8.5)
PROT SERPL-MCNC: 7.5 G/DL (ref 6–8.5)
PROTHROMBIN TIME: 15 SECONDS (ref 11.7–14.2)
QT INTERVAL: 469 MS
QTC INTERVAL: 492 MS
RBC # BLD AUTO: 3.84 10*6/MM3 (ref 4.14–5.8)
RBC # BLD AUTO: 3.87 10*6/MM3 (ref 4.14–5.8)
SMALL PLATELETS BLD QL SMEAR: ADEQUATE
SODIUM SERPL-SCNC: 139 MMOL/L (ref 136–145)
SODIUM SERPL-SCNC: 139 MMOL/L (ref 136–145)
SPHEROCYTES BLD QL SMEAR: NORMAL
TROPONIN T DELTA: -3 NG/L
TROPONIN T SERPL HS-MCNC: 40 NG/L
VARIANT LYMPHS NFR BLD MANUAL: 24 % (ref 19.6–45.3)
WBC MORPH BLD: NORMAL
WBC NRBC COR # BLD: 3.11 10*3/MM3 (ref 3.4–10.8)
WBC NRBC COR # BLD: 3.4 10*3/MM3 (ref 3.4–10.8)

## 2023-09-06 PROCEDURE — 63710000001 INSULIN GLARGINE PER 5 UNITS: Performed by: STUDENT IN AN ORGANIZED HEALTH CARE EDUCATION/TRAINING PROGRAM

## 2023-09-06 PROCEDURE — 80053 COMPREHEN METABOLIC PANEL: CPT | Performed by: NURSE PRACTITIONER

## 2023-09-06 PROCEDURE — 84484 ASSAY OF TROPONIN QUANT: CPT | Performed by: EMERGENCY MEDICINE

## 2023-09-06 PROCEDURE — 85027 COMPLETE CBC AUTOMATED: CPT | Performed by: NURSE PRACTITIONER

## 2023-09-06 PROCEDURE — 71045 X-RAY EXAM CHEST 1 VIEW: CPT

## 2023-09-06 PROCEDURE — 25010000002 FUROSEMIDE PER 20 MG: Performed by: EMERGENCY MEDICINE

## 2023-09-06 PROCEDURE — 94799 UNLISTED PULMONARY SVC/PX: CPT

## 2023-09-06 PROCEDURE — 99222 1ST HOSP IP/OBS MODERATE 55: CPT | Performed by: NURSE PRACTITIONER

## 2023-09-06 PROCEDURE — 82948 REAGENT STRIP/BLOOD GLUCOSE: CPT

## 2023-09-06 PROCEDURE — 36415 COLL VENOUS BLD VENIPUNCTURE: CPT | Performed by: NURSE PRACTITIONER

## 2023-09-06 PROCEDURE — 63710000001 INSULIN LISPRO (HUMAN) PER 5 UNITS: Performed by: NURSE PRACTITIONER

## 2023-09-06 PROCEDURE — 83036 HEMOGLOBIN GLYCOSYLATED A1C: CPT | Performed by: NURSE PRACTITIONER

## 2023-09-06 PROCEDURE — 94761 N-INVAS EAR/PLS OXIMETRY MLT: CPT

## 2023-09-06 PROCEDURE — 25010000002 FUROSEMIDE PER 20 MG: Performed by: NURSE PRACTITIONER

## 2023-09-06 PROCEDURE — 94640 AIRWAY INHALATION TREATMENT: CPT

## 2023-09-06 RX ORDER — INSULIN GLARGINE 100 [IU]/ML
15 INJECTION, SOLUTION SUBCUTANEOUS 2 TIMES DAILY
COMMUNITY

## 2023-09-06 RX ORDER — AMOXICILLIN 250 MG
2 CAPSULE ORAL 2 TIMES DAILY
Status: DISCONTINUED | OUTPATIENT
Start: 2023-09-06 | End: 2023-09-11 | Stop reason: HOSPADM

## 2023-09-06 RX ORDER — NITROGLYCERIN 0.4 MG/1
0.4 TABLET SUBLINGUAL
Status: DISCONTINUED | OUTPATIENT
Start: 2023-09-06 | End: 2023-09-11 | Stop reason: HOSPADM

## 2023-09-06 RX ORDER — PANTOPRAZOLE SODIUM 40 MG/1
40 TABLET, DELAYED RELEASE ORAL DAILY
COMMUNITY

## 2023-09-06 RX ORDER — PANTOPRAZOLE SODIUM 40 MG/1
40 TABLET, DELAYED RELEASE ORAL DAILY
Status: DISCONTINUED | OUTPATIENT
Start: 2023-09-06 | End: 2023-09-11 | Stop reason: HOSPADM

## 2023-09-06 RX ORDER — BUPROPION HYDROCHLORIDE 150 MG/1
150 TABLET, EXTENDED RELEASE ORAL DAILY
Status: DISCONTINUED | OUTPATIENT
Start: 2023-09-06 | End: 2023-09-11 | Stop reason: HOSPADM

## 2023-09-06 RX ORDER — ONDANSETRON 2 MG/ML
4 INJECTION INTRAMUSCULAR; INTRAVENOUS EVERY 6 HOURS PRN
Status: DISCONTINUED | OUTPATIENT
Start: 2023-09-06 | End: 2023-09-11 | Stop reason: HOSPADM

## 2023-09-06 RX ORDER — ACETAMINOPHEN 160 MG/5ML
650 SOLUTION ORAL EVERY 4 HOURS PRN
Status: DISCONTINUED | OUTPATIENT
Start: 2023-09-06 | End: 2023-09-11 | Stop reason: HOSPADM

## 2023-09-06 RX ORDER — SODIUM CHLORIDE 0.9 % (FLUSH) 0.9 %
10 SYRINGE (ML) INJECTION EVERY 12 HOURS SCHEDULED
Status: DISCONTINUED | OUTPATIENT
Start: 2023-09-06 | End: 2023-09-11 | Stop reason: HOSPADM

## 2023-09-06 RX ORDER — FUROSEMIDE 10 MG/ML
40 INJECTION INTRAMUSCULAR; INTRAVENOUS 2 TIMES DAILY
Status: DISCONTINUED | OUTPATIENT
Start: 2023-09-06 | End: 2023-09-06

## 2023-09-06 RX ORDER — ACETAMINOPHEN 325 MG/1
650 TABLET ORAL EVERY 4 HOURS PRN
Status: DISCONTINUED | OUTPATIENT
Start: 2023-09-06 | End: 2023-09-11 | Stop reason: HOSPADM

## 2023-09-06 RX ORDER — BISACODYL 10 MG
10 SUPPOSITORY, RECTAL RECTAL DAILY PRN
Status: DISCONTINUED | OUTPATIENT
Start: 2023-09-06 | End: 2023-09-11 | Stop reason: HOSPADM

## 2023-09-06 RX ORDER — POLYETHYLENE GLYCOL 3350 17 G/17G
17 POWDER, FOR SOLUTION ORAL DAILY PRN
Status: DISCONTINUED | OUTPATIENT
Start: 2023-09-06 | End: 2023-09-11 | Stop reason: HOSPADM

## 2023-09-06 RX ORDER — BISACODYL 5 MG/1
5 TABLET, DELAYED RELEASE ORAL DAILY PRN
Status: DISCONTINUED | OUTPATIENT
Start: 2023-09-06 | End: 2023-09-11 | Stop reason: HOSPADM

## 2023-09-06 RX ORDER — METOPROLOL SUCCINATE 25 MG/1
25 TABLET, EXTENDED RELEASE ORAL NIGHTLY
Status: DISCONTINUED | OUTPATIENT
Start: 2023-09-06 | End: 2023-09-07

## 2023-09-06 RX ORDER — LEVOTHYROXINE SODIUM 0.03 MG/1
25 TABLET ORAL
Status: DISCONTINUED | OUTPATIENT
Start: 2023-09-07 | End: 2023-09-11 | Stop reason: HOSPADM

## 2023-09-06 RX ORDER — NICOTINE POLACRILEX 4 MG
15 LOZENGE BUCCAL
Status: DISCONTINUED | OUTPATIENT
Start: 2023-09-06 | End: 2023-09-11 | Stop reason: HOSPADM

## 2023-09-06 RX ORDER — ATORVASTATIN CALCIUM 20 MG/1
40 TABLET, FILM COATED ORAL NIGHTLY
Status: DISCONTINUED | OUTPATIENT
Start: 2023-09-06 | End: 2023-09-11 | Stop reason: HOSPADM

## 2023-09-06 RX ORDER — SODIUM CHLORIDE 9 MG/ML
40 INJECTION, SOLUTION INTRAVENOUS AS NEEDED
Status: DISCONTINUED | OUTPATIENT
Start: 2023-09-06 | End: 2023-09-11 | Stop reason: HOSPADM

## 2023-09-06 RX ORDER — SODIUM CHLORIDE 0.9 % (FLUSH) 0.9 %
10 SYRINGE (ML) INJECTION AS NEEDED
Status: DISCONTINUED | OUTPATIENT
Start: 2023-09-06 | End: 2023-09-11 | Stop reason: HOSPADM

## 2023-09-06 RX ORDER — ATORVASTATIN CALCIUM 40 MG/1
40 TABLET, FILM COATED ORAL DAILY
COMMUNITY

## 2023-09-06 RX ORDER — ACETAMINOPHEN 650 MG/1
650 SUPPOSITORY RECTAL EVERY 4 HOURS PRN
Status: DISCONTINUED | OUTPATIENT
Start: 2023-09-06 | End: 2023-09-11 | Stop reason: HOSPADM

## 2023-09-06 RX ORDER — ATORVASTATIN CALCIUM 20 MG/1
40 TABLET, FILM COATED ORAL DAILY
Status: DISCONTINUED | OUTPATIENT
Start: 2023-09-06 | End: 2023-09-06

## 2023-09-06 RX ORDER — DEXTROSE MONOHYDRATE 25 G/50ML
25 INJECTION, SOLUTION INTRAVENOUS
Status: DISCONTINUED | OUTPATIENT
Start: 2023-09-06 | End: 2023-09-11 | Stop reason: HOSPADM

## 2023-09-06 RX ORDER — FUROSEMIDE 10 MG/ML
80 INJECTION INTRAMUSCULAR; INTRAVENOUS ONCE
Status: COMPLETED | OUTPATIENT
Start: 2023-09-06 | End: 2023-09-06

## 2023-09-06 RX ORDER — INSULIN LISPRO 100 [IU]/ML
2-7 INJECTION, SOLUTION INTRAVENOUS; SUBCUTANEOUS
Status: DISCONTINUED | OUTPATIENT
Start: 2023-09-06 | End: 2023-09-11 | Stop reason: HOSPADM

## 2023-09-06 RX ORDER — HYDROCODONE BITARTRATE AND ACETAMINOPHEN 10; 325 MG/1; MG/1
1 TABLET ORAL EVERY 6 HOURS PRN
Status: DISCONTINUED | OUTPATIENT
Start: 2023-09-06 | End: 2023-09-11 | Stop reason: HOSPADM

## 2023-09-06 RX ORDER — BUMETANIDE 0.25 MG/ML
1 INJECTION INTRAMUSCULAR; INTRAVENOUS EVERY 12 HOURS
Status: DISCONTINUED | OUTPATIENT
Start: 2023-09-06 | End: 2023-09-07

## 2023-09-06 RX ORDER — ONDANSETRON 4 MG/1
4 TABLET, FILM COATED ORAL EVERY 6 HOURS PRN
COMMUNITY

## 2023-09-06 RX ORDER — IBUPROFEN 600 MG/1
1 TABLET ORAL
Status: DISCONTINUED | OUTPATIENT
Start: 2023-09-06 | End: 2023-09-11 | Stop reason: HOSPADM

## 2023-09-06 RX ORDER — INSULIN GLARGINE 100 [IU]/ML
15 INJECTION, SOLUTION SUBCUTANEOUS 2 TIMES DAILY
Status: DISCONTINUED | OUTPATIENT
Start: 2023-09-06 | End: 2023-09-11 | Stop reason: HOSPADM

## 2023-09-06 RX ORDER — BUDESONIDE AND FORMOTEROL FUMARATE DIHYDRATE 160; 4.5 UG/1; UG/1
1 AEROSOL RESPIRATORY (INHALATION)
Status: DISCONTINUED | OUTPATIENT
Start: 2023-09-06 | End: 2023-09-11 | Stop reason: HOSPADM

## 2023-09-06 RX ORDER — ALBUTEROL SULFATE 2.5 MG/3ML
2.5 SOLUTION RESPIRATORY (INHALATION) EVERY 6 HOURS PRN
Status: DISCONTINUED | OUTPATIENT
Start: 2023-09-06 | End: 2023-09-11 | Stop reason: HOSPADM

## 2023-09-06 RX ADMIN — SENNOSIDES AND DOCUSATE SODIUM 2 TABLET: 50; 8.6 TABLET ORAL at 20:36

## 2023-09-06 RX ADMIN — BUDESONIDE AND FORMOTEROL FUMARATE DIHYDRATE 1 PUFF: 160; 4.5 AEROSOL RESPIRATORY (INHALATION) at 11:51

## 2023-09-06 RX ADMIN — Medication 10 ML: at 20:40

## 2023-09-06 RX ADMIN — INSULIN LISPRO 2 UNITS: 100 INJECTION, SOLUTION INTRAVENOUS; SUBCUTANEOUS at 21:25

## 2023-09-06 RX ADMIN — HYDROCODONE BITARTRATE AND ACETAMINOPHEN 1 TABLET: 10; 325 TABLET ORAL at 12:10

## 2023-09-06 RX ADMIN — INSULIN LISPRO 3 UNITS: 100 INJECTION, SOLUTION INTRAVENOUS; SUBCUTANEOUS at 13:02

## 2023-09-06 RX ADMIN — BUDESONIDE AND FORMOTEROL FUMARATE DIHYDRATE 1 PUFF: 160; 4.5 AEROSOL RESPIRATORY (INHALATION) at 20:21

## 2023-09-06 RX ADMIN — EMPAGLIFLOZIN 10 MG: 10 TABLET, FILM COATED ORAL at 15:19

## 2023-09-06 RX ADMIN — METOPROLOL SUCCINATE 25 MG: 25 TABLET, EXTENDED RELEASE ORAL at 20:40

## 2023-09-06 RX ADMIN — ATORVASTATIN CALCIUM 40 MG: 20 TABLET, FILM COATED ORAL at 20:36

## 2023-09-06 RX ADMIN — BUMETANIDE 1 MG: 0.25 INJECTION INTRAMUSCULAR; INTRAVENOUS at 13:03

## 2023-09-06 RX ADMIN — INSULIN LISPRO 2 UNITS: 100 INJECTION, SOLUTION INTRAVENOUS; SUBCUTANEOUS at 18:30

## 2023-09-06 RX ADMIN — Medication 10 ML: at 10:22

## 2023-09-06 RX ADMIN — FUROSEMIDE 80 MG: 20 INJECTION, SOLUTION INTRAMUSCULAR; INTRAVENOUS at 01:02

## 2023-09-06 RX ADMIN — FUROSEMIDE 40 MG: 10 INJECTION, SOLUTION INTRAMUSCULAR; INTRAVENOUS at 10:21

## 2023-09-06 RX ADMIN — INSULIN GLARGINE 15 UNITS: 100 INJECTION, SOLUTION SUBCUTANEOUS at 21:25

## 2023-09-06 RX ADMIN — HYDROCODONE BITARTRATE AND ACETAMINOPHEN 1 TABLET: 10; 325 TABLET ORAL at 18:30

## 2023-09-06 RX ADMIN — BUPROPION HYDROCHLORIDE 150 MG: 150 TABLET, EXTENDED RELEASE ORAL at 13:02

## 2023-09-06 RX ADMIN — PANTOPRAZOLE SODIUM 40 MG: 40 TABLET, DELAYED RELEASE ORAL at 12:10

## 2023-09-06 NOTE — ED NOTES
Nursing report ED to floor  Brandon Alan  78 y.o.  male    HPI :   Chief Complaint   Patient presents with    Shortness of Breath       Admitting doctor:   Jeremy Dill MD    Admitting diagnosis:   The primary encounter diagnosis was Acute on chronic congestive heart failure, unspecified heart failure type. Diagnoses of Pedal edema, Elevated troponin, and Acute anemia were also pertinent to this visit.    Code status:   Current Code Status       Date Active Code Status Order ID Comments User Context       Prior            Allergies:   Penicillins    Isolation:   No active isolations    Intake and Output  No intake or output data in the 24 hours ending 09/06/23 0132    Weight:       09/05/23  2305   Weight: (!) 161 kg (355 lb)       Most recent vitals:   Vitals:    09/05/23 2316 09/05/23 2319 09/05/23 2331 09/06/23 0031   BP: 115/66  110/61 106/58   BP Location:       Patient Position:       Pulse: 84 85 65 65   Resp:       Temp:       TempSrc:       SpO2:  91% 94% 94%   Weight:       Height:           Active LDAs/IV Access:   Lines, Drains & Airways       Active LDAs       Name Placement date Placement time Site Days    Peripheral IV 09/05/23 2323 Right Antecubital 09/05/23 2323  Antecubital  less than 1                    Labs (abnormal labs have a star):   Labs Reviewed   COMPREHENSIVE METABOLIC PANEL - Abnormal; Notable for the following components:       Result Value    Glucose 189 (*)     BUN 25 (*)     Creatinine 1.35 (*)     Chloride 96 (*)     CO2 30.0 (*)     Calcium 8.4 (*)     Albumin 3.3 (*)     Alkaline Phosphatase 150 (*)     eGFR 54.1 (*)     All other components within normal limits    Narrative:     GFR Normal >60  Chronic Kidney Disease <60  Kidney Failure <15    The GFR formula is only valid for adults with stable renal function between ages 18 and 70.   PROTIME-INR - Abnormal; Notable for the following components:    Protime 15.0 (*)     INR 1.17 (*)     All other components within  normal limits   TROPONIN - Abnormal; Notable for the following components:    HS Troponin T 40 (*)     All other components within normal limits    Narrative:     High Sensitive Troponin T Reference Range:  <10.0 ng/L- Negative Female for AMI  <15.0 ng/L- Negative Male for AMI  >=10 - Abnormal Female indicating possible myocardial injury.  >=15 - Abnormal Male indicating possible myocardial injury.   Clinicians would have to utilize clinical acumen, EKG, Troponin, and serial changes to determine if it is an Acute Myocardial Infarction or myocardial injury due to an underlying chronic condition.        CBC WITH AUTO DIFFERENTIAL - Abnormal; Notable for the following components:    RBC 3.87 (*)     Hemoglobin 9.6 (*)     Hematocrit 31.4 (*)     MCH 24.8 (*)     MCHC 30.6 (*)     Platelets 116 (*)     All other components within normal limits   APTT - Normal   BNP (IN-HOUSE) - Normal    Narrative:     Among patients with dyspnea, NT-proBNP is highly sensitive for the detection of acute congestive heart failure. In addition NT-proBNP of <300 pg/ml effectively rules out acute congestive heart failure with 99% negative predictive value.     HIGH SENSITIVITIY TROPONIN T 2HR   CBC AND DIFFERENTIAL    Narrative:     The following orders were created for panel order CBC & Differential.  Procedure                               Abnormality         Status                     ---------                               -----------         ------                     CBC Auto Differential[[id number]]        Abnormal            Final result                 Please view results for these tests on the individual orders.       EKG:   ECG 12 Lead Dyspnea   Preliminary Result   HEART RATE= 66  bpm   RR Interval= 909  ms   GA Interval= 175  ms   P Horizontal Axis= 31  deg   P Front Axis= 45  deg   QRSD Interval= 132  ms   QT Interval= 469  ms   QTcB= 492  ms   QRS Axis= -37  deg   T Wave Axis= 118  deg   - ABNORMAL ECG -   Sinus rhythm    Nonspecific IVCD with LAD   Nonspecific T abnormalities, lateral leads   Baseline wander in lead(s) I,III,aVL,aVF   Electronically Signed By:    Date and Time of Study: 2023-09-05 22:54:54          Meds given in ED:   Medications   sodium chloride 0.9 % flush 10 mL (has no administration in time range)   furosemide (LASIX) injection 80 mg (80 mg Intravenous Given 9/6/23 0102)     ED Provider Notes  Rodrigo Joe MD (Physician)  Medicine  Expand All Collapse All   EMERGENCY DEPARTMENT ENCOUNTER     Room Number:  25/25  PCP: Dipti Kay MD  Historian: Patient        HPI:  Chief Complaint: Shortness of breath  A complete HPI/ROS/PMH/PSH/SH/FH are unobtainable due to: None  Context: Brandon Alan is a 78 y.o. male who presents to the ED c/o shortness of breath with associated lower extremity edema that has been steadily worsening now over the past couple of weeks.  He does state that he has a history of CKD as well as congestive heart failure and went without his diuretics for short while.  He currently denies chest pain, back pain, nausea/vomiting, or fevers and chills.  He states that the shortness of breath is made significantly worse with exertion.        Imaging results:  XR Chest 1 View    Result Date: 9/6/2023  Cardiomegaly with suspected vascular congestion.  This report was finalized on 9/6/2023 12:26 AM by Dr. Samira Chavez M.D.       Ambulatory status:   - assist x2  Social issues:   Social History     Socioeconomic History    Marital status: Single   Tobacco Use    Smoking status: Never   Vaping Use    Vaping Use: Never used   Substance and Sexual Activity    Alcohol use: No    Drug use: No    Sexual activity: Defer       NIH Stroke Scale:       Ariana Chiu RN  09/06/23 01:32 EDT
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
## 2023-09-06 NOTE — PLAN OF CARE
Goal Outcome Evaluation:                   Diuretic in Use: yes  Response to Diuretics (Output greater than intake): lasix  Daily Weight (up or down): first weight  O2 Requirements: oxygen 3 L nasal cannula  Functional Status (Activity level, tolerance and respiratory symptoms): assist x 1  Discharge Plans: cont on lasix  I/v, strict intake and output
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
## 2023-09-07 PROBLEM — D61.818 PANCYTOPENIA: Status: ACTIVE | Noted: 2023-09-07

## 2023-09-07 LAB
ANION GAP SERPL CALCULATED.3IONS-SCNC: 8.7 MMOL/L (ref 5–15)
BUN SERPL-MCNC: 18 MG/DL (ref 8–23)
BUN/CREAT SERPL: 15.3 (ref 7–25)
CALCIUM SPEC-SCNC: 8.2 MG/DL (ref 8.6–10.5)
CHLORIDE SERPL-SCNC: 98 MMOL/L (ref 98–107)
CO2 SERPL-SCNC: 32.3 MMOL/L (ref 22–29)
CREAT SERPL-MCNC: 1.18 MG/DL (ref 0.76–1.27)
DEPRECATED RDW RBC AUTO: 44.2 FL (ref 37–54)
EGFRCR SERPLBLD CKD-EPI 2021: 63.6 ML/MIN/1.73
ERYTHROCYTE [DISTWIDTH] IN BLOOD BY AUTOMATED COUNT: 14.8 % (ref 12.3–15.4)
GLUCOSE BLDC GLUCOMTR-MCNC: 161 MG/DL (ref 70–130)
GLUCOSE BLDC GLUCOMTR-MCNC: 168 MG/DL (ref 70–130)
GLUCOSE BLDC GLUCOMTR-MCNC: 171 MG/DL (ref 70–130)
GLUCOSE BLDC GLUCOMTR-MCNC: 194 MG/DL (ref 70–130)
GLUCOSE SERPL-MCNC: 141 MG/DL (ref 65–99)
HCT VFR BLD AUTO: 30.4 % (ref 37.5–51)
HGB BLD-MCNC: 9.1 G/DL (ref 13–17.7)
MCH RBC QN AUTO: 24.5 PG (ref 26.6–33)
MCHC RBC AUTO-ENTMCNC: 29.9 G/DL (ref 31.5–35.7)
MCV RBC AUTO: 81.7 FL (ref 79–97)
PLATELET # BLD AUTO: 101 10*3/MM3 (ref 140–450)
PMV BLD AUTO: 10.7 FL (ref 6–12)
POTASSIUM SERPL-SCNC: 4.1 MMOL/L (ref 3.5–5.2)
RBC # BLD AUTO: 3.72 10*6/MM3 (ref 4.14–5.8)
SODIUM SERPL-SCNC: 139 MMOL/L (ref 136–145)
WBC NRBC COR # BLD: 3.05 10*3/MM3 (ref 3.4–10.8)

## 2023-09-07 PROCEDURE — 94799 UNLISTED PULMONARY SVC/PX: CPT

## 2023-09-07 PROCEDURE — 97110 THERAPEUTIC EXERCISES: CPT

## 2023-09-07 PROCEDURE — 99232 SBSQ HOSP IP/OBS MODERATE 35: CPT | Performed by: INTERNAL MEDICINE

## 2023-09-07 PROCEDURE — 85027 COMPLETE CBC AUTOMATED: CPT | Performed by: NURSE PRACTITIONER

## 2023-09-07 PROCEDURE — 94664 DEMO&/EVAL PT USE INHALER: CPT

## 2023-09-07 PROCEDURE — 94761 N-INVAS EAR/PLS OXIMETRY MLT: CPT

## 2023-09-07 PROCEDURE — 97162 PT EVAL MOD COMPLEX 30 MIN: CPT

## 2023-09-07 PROCEDURE — 80048 BASIC METABOLIC PNL TOTAL CA: CPT | Performed by: NURSE PRACTITIONER

## 2023-09-07 PROCEDURE — 63710000001 INSULIN GLARGINE PER 5 UNITS: Performed by: STUDENT IN AN ORGANIZED HEALTH CARE EDUCATION/TRAINING PROGRAM

## 2023-09-07 PROCEDURE — 82948 REAGENT STRIP/BLOOD GLUCOSE: CPT

## 2023-09-07 PROCEDURE — 63710000001 INSULIN LISPRO (HUMAN) PER 5 UNITS: Performed by: NURSE PRACTITIONER

## 2023-09-07 RX ORDER — BUMETANIDE 0.25 MG/ML
2 INJECTION INTRAMUSCULAR; INTRAVENOUS EVERY 8 HOURS
Status: DISCONTINUED | OUTPATIENT
Start: 2023-09-07 | End: 2023-09-09

## 2023-09-07 RX ORDER — SPIRONOLACTONE 25 MG/1
25 TABLET ORAL DAILY
Status: DISCONTINUED | OUTPATIENT
Start: 2023-09-07 | End: 2023-09-11 | Stop reason: HOSPADM

## 2023-09-07 RX ADMIN — SENNOSIDES AND DOCUSATE SODIUM 2 TABLET: 50; 8.6 TABLET ORAL at 09:59

## 2023-09-07 RX ADMIN — Medication 10 ML: at 10:00

## 2023-09-07 RX ADMIN — INSULIN LISPRO 2 UNITS: 100 INJECTION, SOLUTION INTRAVENOUS; SUBCUTANEOUS at 11:50

## 2023-09-07 RX ADMIN — ATORVASTATIN CALCIUM 40 MG: 20 TABLET, FILM COATED ORAL at 21:25

## 2023-09-07 RX ADMIN — BUDESONIDE AND FORMOTEROL FUMARATE DIHYDRATE 1 PUFF: 160; 4.5 AEROSOL RESPIRATORY (INHALATION) at 07:01

## 2023-09-07 RX ADMIN — PANTOPRAZOLE SODIUM 40 MG: 40 TABLET, DELAYED RELEASE ORAL at 09:57

## 2023-09-07 RX ADMIN — BUMETANIDE 2 MG: 0.25 INJECTION INTRAMUSCULAR; INTRAVENOUS at 10:07

## 2023-09-07 RX ADMIN — INSULIN GLARGINE 15 UNITS: 100 INJECTION, SOLUTION SUBCUTANEOUS at 21:25

## 2023-09-07 RX ADMIN — LEVOTHYROXINE SODIUM 25 MCG: 0.03 TABLET ORAL at 06:10

## 2023-09-07 RX ADMIN — HYDROCODONE BITARTRATE AND ACETAMINOPHEN 1 TABLET: 10; 325 TABLET ORAL at 13:15

## 2023-09-07 RX ADMIN — EMPAGLIFLOZIN 10 MG: 10 TABLET, FILM COATED ORAL at 09:58

## 2023-09-07 RX ADMIN — HYDROCODONE BITARTRATE AND ACETAMINOPHEN 1 TABLET: 10; 325 TABLET ORAL at 21:25

## 2023-09-07 RX ADMIN — BUPROPION HYDROCHLORIDE 150 MG: 150 TABLET, EXTENDED RELEASE ORAL at 10:00

## 2023-09-07 RX ADMIN — BUMETANIDE 2 MG: 0.25 INJECTION INTRAMUSCULAR; INTRAVENOUS at 17:31

## 2023-09-07 RX ADMIN — INSULIN GLARGINE 15 UNITS: 100 INJECTION, SOLUTION SUBCUTANEOUS at 09:58

## 2023-09-07 RX ADMIN — INSULIN LISPRO 2 UNITS: 100 INJECTION, SOLUTION INTRAVENOUS; SUBCUTANEOUS at 17:07

## 2023-09-07 RX ADMIN — HYDROCODONE BITARTRATE AND ACETAMINOPHEN 1 TABLET: 10; 325 TABLET ORAL at 06:10

## 2023-09-07 RX ADMIN — INSULIN LISPRO 2 UNITS: 100 INJECTION, SOLUTION INTRAVENOUS; SUBCUTANEOUS at 07:49

## 2023-09-07 RX ADMIN — INSULIN LISPRO 2 UNITS: 100 INJECTION, SOLUTION INTRAVENOUS; SUBCUTANEOUS at 21:25

## 2023-09-07 RX ADMIN — BUMETANIDE 1 MG: 0.25 INJECTION INTRAMUSCULAR; INTRAVENOUS at 00:25

## 2023-09-07 RX ADMIN — SPIRONOLACTONE 25 MG: 25 TABLET ORAL at 11:50
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
## 2023-09-07 NOTE — CASE MANAGEMENT/SOCIAL WORK
Discharge Planning Assessment  [facility]     Patient Name: Brandon Alan  MRN: [id number]  Today's Date: 9/7/2023    Admit Date: 9/5/2023    Plan: Home; Denies needs.   Discharge Needs Assessment       Row Name 09/07/23 1140       Living Environment    People in Home child(amelia), adult    Name(s) of People in Home Lily 27 year old daughter    Current Living Arrangements home    Potentially Unsafe Housing Conditions none    Primary Care Provided by self    Provides Primary Care For no one    Family Caregiver if Needed child(amelia), adult    Family Caregiver Names Lily, carissa    Quality of Family Relationships helpful;involved;supportive       Resource/Environmental Concerns    Resource/Environmental Concerns none    Transportation Concerns none       Food Insecurity    Within the past 12 months, you worried that your food would run out before you got the money to buy more. Never true    Within the past 12 months, the food you bought just didn't last and you didn't have money to get more. Never true       Transition Planning    Patient/Family Anticipates Transition to home with family    Patient/Family Anticipated Services at Transition none    Transportation Anticipated family or friend will provide       Discharge Needs Assessment    Equipment Currently Used at Home glucometer;ramp;oxygen;shower chair;rollator;cane, straight;wheelchair    Concerns to be Addressed no discharge needs identified;denies needs/concerns at this time    Anticipated Changes Related to Illness none    Equipment Needed After Discharge none    Provided Post Acute Provider List? Refused    Refused Provider List Comment Pt declines need for list.                   Discharge Plan       Row Name 09/07/23 1142       Plan    Plan Home; Denies needs.    Plan Comments CCP spoke with Pt at bedside.  CCP role explained and discharge planning discussed.  Face sheet verified.  Pt stated he is IADL's, retired and drives.  Pt lives with adult  daughter/Lily in a single-story home with ramp entrance.  Pt reports PCP is, Dipti Kay.  Pt confirmed pharmacy is, [facility] [place].  Pt denies use of past home health.  Pt reports he recently discharged from [facility].  Pt has the following DME- entrance ramp, straight cane, rollator, shower chair, wheelchair, glucometer and nocturnal oxygen ([place]).  Pt plans to return home at discharge and he is not for sure who will transport.  Pt denies current discharge needs.  PT eval is pending.  CCP will continue to follow…….Kathy VALLES /.                  Continued Care and Services - Admitted Since 9/5/2023    Coordination has not been started for this encounter.       Expected Discharge Date and Time       Expected Discharge Date Expected Discharge Time    Sep 8, 2023            Demographic Summary       Row Name 09/07/23 1139       General Information    Admission Type inpatient    Arrived From emergency department    Required Notices Provided Important Message from Medicare    Referral Source admission list    Reason for Consult discharge planning    Preferred Language English                   Functional Status       Row Name 09/07/23 1140       Functional Status    Usual Activity Tolerance moderate    Current Activity Tolerance moderate       Assessment of Health Literacy    Health Literacy Good       Functional Status, IADL    Medications independent    Meal Preparation assistive equipment and person    Housekeeping assistive equipment and person    Laundry assistive equipment and person    Shopping assistive equipment and person       Mental Status    General Appearance WDL WDL       Mental Status Summary    Recent Changes in Mental Status/Cognitive Functioning no changes       Employment/    Employment Status retired                   Psychosocial    No documentation.                  Abuse/Neglect    No documentation.                  Legal    No documentation.                   Substance Abuse    No documentation.                  Patient Forms    No documentation.                     Kathy Hui RN
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
## 2023-09-08 LAB
ALBUMIN SERPL-MCNC: 3.1 G/DL (ref 3.5–5.2)
ALBUMIN/GLOB SERPL: 0.7 G/DL
ALP SERPL-CCNC: 125 U/L (ref 39–117)
ALT SERPL W P-5'-P-CCNC: 11 U/L (ref 1–41)
ANION GAP SERPL CALCULATED.3IONS-SCNC: 8.2 MMOL/L (ref 5–15)
AST SERPL-CCNC: 27 U/L (ref 1–40)
BILIRUB SERPL-MCNC: 0.8 MG/DL (ref 0–1.2)
BUN SERPL-MCNC: 16 MG/DL (ref 8–23)
BUN/CREAT SERPL: 14.8 (ref 7–25)
CALCIUM SPEC-SCNC: 8.6 MG/DL (ref 8.6–10.5)
CHLORIDE SERPL-SCNC: 96 MMOL/L (ref 98–107)
CO2 SERPL-SCNC: 31.8 MMOL/L (ref 22–29)
CREAT SERPL-MCNC: 1.08 MG/DL (ref 0.76–1.27)
DEPRECATED RDW RBC AUTO: 43.3 FL (ref 37–54)
EGFRCR SERPLBLD CKD-EPI 2021: 70.7 ML/MIN/1.73
ERYTHROCYTE [DISTWIDTH] IN BLOOD BY AUTOMATED COUNT: 14.9 % (ref 12.3–15.4)
FERRITIN SERPL-MCNC: 51.3 NG/ML (ref 30–400)
FOLATE SERPL-MCNC: 7.46 NG/ML (ref 4.78–24.2)
GLOBULIN UR ELPH-MCNC: 4.3 GM/DL
GLUCOSE BLDC GLUCOMTR-MCNC: 140 MG/DL (ref 70–130)
GLUCOSE BLDC GLUCOMTR-MCNC: 142 MG/DL (ref 70–130)
GLUCOSE BLDC GLUCOMTR-MCNC: 148 MG/DL (ref 70–130)
GLUCOSE BLDC GLUCOMTR-MCNC: 159 MG/DL (ref 70–130)
GLUCOSE SERPL-MCNC: 134 MG/DL (ref 65–99)
HCT VFR BLD AUTO: 31.3 % (ref 37.5–51)
HGB BLD-MCNC: 9.6 G/DL (ref 13–17.7)
IRON 24H UR-MRATE: 38 MCG/DL (ref 59–158)
IRON SATN MFR SERPL: 8 % (ref 20–50)
MCH RBC QN AUTO: 24.7 PG (ref 26.6–33)
MCHC RBC AUTO-ENTMCNC: 30.7 G/DL (ref 31.5–35.7)
MCV RBC AUTO: 80.5 FL (ref 79–97)
PLATELET # BLD AUTO: 113 10*3/MM3 (ref 140–450)
PMV BLD AUTO: 10.6 FL (ref 6–12)
POTASSIUM SERPL-SCNC: 3.9 MMOL/L (ref 3.5–5.2)
PROT SERPL-MCNC: 7.4 G/DL (ref 6–8.5)
RBC # BLD AUTO: 3.89 10*6/MM3 (ref 4.14–5.8)
SODIUM SERPL-SCNC: 136 MMOL/L (ref 136–145)
TIBC SERPL-MCNC: 499 MCG/DL (ref 298–536)
TRANSFERRIN SERPL-MCNC: 335 MG/DL (ref 200–360)
VIT B12 BLD-MCNC: 434 PG/ML (ref 211–946)
WBC NRBC COR # BLD: 2.97 10*3/MM3 (ref 3.4–10.8)

## 2023-09-08 PROCEDURE — 82746 ASSAY OF FOLIC ACID SERUM: CPT | Performed by: NURSE PRACTITIONER

## 2023-09-08 PROCEDURE — 82607 VITAMIN B-12: CPT | Performed by: NURSE PRACTITIONER

## 2023-09-08 PROCEDURE — 84466 ASSAY OF TRANSFERRIN: CPT | Performed by: NURSE PRACTITIONER

## 2023-09-08 PROCEDURE — 63710000001 INSULIN LISPRO (HUMAN) PER 5 UNITS: Performed by: NURSE PRACTITIONER

## 2023-09-08 PROCEDURE — 94761 N-INVAS EAR/PLS OXIMETRY MLT: CPT

## 2023-09-08 PROCEDURE — 94799 UNLISTED PULMONARY SVC/PX: CPT

## 2023-09-08 PROCEDURE — 82728 ASSAY OF FERRITIN: CPT | Performed by: NURSE PRACTITIONER

## 2023-09-08 PROCEDURE — 94664 DEMO&/EVAL PT USE INHALER: CPT

## 2023-09-08 PROCEDURE — 82948 REAGENT STRIP/BLOOD GLUCOSE: CPT

## 2023-09-08 PROCEDURE — 63710000001 INSULIN GLARGINE PER 5 UNITS: Performed by: STUDENT IN AN ORGANIZED HEALTH CARE EDUCATION/TRAINING PROGRAM

## 2023-09-08 PROCEDURE — 85027 COMPLETE CBC AUTOMATED: CPT | Performed by: NURSE PRACTITIONER

## 2023-09-08 PROCEDURE — 80053 COMPREHEN METABOLIC PANEL: CPT | Performed by: NURSE PRACTITIONER

## 2023-09-08 PROCEDURE — 97110 THERAPEUTIC EXERCISES: CPT

## 2023-09-08 PROCEDURE — 99232 SBSQ HOSP IP/OBS MODERATE 35: CPT | Performed by: INTERNAL MEDICINE

## 2023-09-08 PROCEDURE — 83540 ASSAY OF IRON: CPT | Performed by: NURSE PRACTITIONER

## 2023-09-08 RX ORDER — CHOLECALCIFEROL (VITAMIN D3) 125 MCG
500 CAPSULE ORAL DAILY
Status: DISCONTINUED | OUTPATIENT
Start: 2023-09-08 | End: 2023-09-11 | Stop reason: HOSPADM

## 2023-09-08 RX ADMIN — BUMETANIDE 2 MG: 0.25 INJECTION INTRAMUSCULAR; INTRAVENOUS at 16:25

## 2023-09-08 RX ADMIN — HYDROCODONE BITARTRATE AND ACETAMINOPHEN 1 TABLET: 10; 325 TABLET ORAL at 14:43

## 2023-09-08 RX ADMIN — BUPROPION HYDROCHLORIDE 150 MG: 150 TABLET, EXTENDED RELEASE ORAL at 08:48

## 2023-09-08 RX ADMIN — BUMETANIDE 2 MG: 0.25 INJECTION INTRAMUSCULAR; INTRAVENOUS at 08:49

## 2023-09-08 RX ADMIN — HYDROCODONE BITARTRATE AND ACETAMINOPHEN 1 TABLET: 10; 325 TABLET ORAL at 08:49

## 2023-09-08 RX ADMIN — Medication 10 ML: at 08:57

## 2023-09-08 RX ADMIN — INSULIN GLARGINE 15 UNITS: 100 INJECTION, SOLUTION SUBCUTANEOUS at 21:01

## 2023-09-08 RX ADMIN — BUMETANIDE 2 MG: 0.25 INJECTION INTRAMUSCULAR; INTRAVENOUS at 23:49

## 2023-09-08 RX ADMIN — SPIRONOLACTONE 25 MG: 25 TABLET ORAL at 08:48

## 2023-09-08 RX ADMIN — SENNOSIDES AND DOCUSATE SODIUM 2 TABLET: 50; 8.6 TABLET ORAL at 20:55

## 2023-09-08 RX ADMIN — INSULIN LISPRO 2 UNITS: 100 INJECTION, SOLUTION INTRAVENOUS; SUBCUTANEOUS at 12:36

## 2023-09-08 RX ADMIN — BUDESONIDE AND FORMOTEROL FUMARATE DIHYDRATE 1 PUFF: 160; 4.5 AEROSOL RESPIRATORY (INHALATION) at 11:07

## 2023-09-08 RX ADMIN — PANTOPRAZOLE SODIUM 40 MG: 40 TABLET, DELAYED RELEASE ORAL at 08:48

## 2023-09-08 RX ADMIN — EMPAGLIFLOZIN 10 MG: 10 TABLET, FILM COATED ORAL at 08:48

## 2023-09-08 RX ADMIN — ATORVASTATIN CALCIUM 40 MG: 20 TABLET, FILM COATED ORAL at 20:55

## 2023-09-08 RX ADMIN — SENNOSIDES AND DOCUSATE SODIUM 2 TABLET: 50; 8.6 TABLET ORAL at 08:49

## 2023-09-08 RX ADMIN — LEVOTHYROXINE SODIUM 25 MCG: 0.03 TABLET ORAL at 06:49

## 2023-09-08 RX ADMIN — Medication 500 MCG: at 16:25

## 2023-09-08 RX ADMIN — BUDESONIDE AND FORMOTEROL FUMARATE DIHYDRATE 1 PUFF: 160; 4.5 AEROSOL RESPIRATORY (INHALATION) at 19:42

## 2023-09-08 RX ADMIN — INSULIN GLARGINE 15 UNITS: 100 INJECTION, SOLUTION SUBCUTANEOUS at 08:48

## 2023-09-08 NOTE — THERAPY TREATMENT NOTE
Patient Name: Brandon Alan  : 1945    MRN: [id number]                              Today's Date: 2023       Admit Date: 2023    Visit Dx:     ICD-10-CM ICD-9-CM   1. Acute on chronic congestive heart failure, unspecified heart failure type  I50.9 428.0   2. Pedal edema  R60.0 782.3   3. Elevated troponin  R77.8 790.6   4. Acute anemia  D64.9 285.9     Patient Active Problem List   Diagnosis    DM type 2 (diabetes mellitus, type 2)    Hypertension    Hyponatremia    Obesity    YOAN and COPD overlap syndrome    Stage 2 chronic kidney disease    Acute on chronic diastolic (congestive) heart failure    Pancytopenia     Past Medical History:   Diagnosis Date    CHF (congestive heart failure)     Depression     Diabetes mellitus     Disease of thyroid gland     Hypertension      History reviewed. No pertinent surgical history.   General Information       Row Name 23 09          Physical Therapy Time and Intention    Document Type therapy note (daily note)  -     Mode of Treatment physical therapy  -       Row Name 23 09          General Information    Existing Precautions/Restrictions fall;oxygen therapy device and L/min  -       Row Name 23 09          Cognition    Orientation Status (Cognition) oriented x 4  -               User Key  (r) = Recorded By, (t) = Taken By, (c) = Cosigned By      Initials Name Provider Type     Chante Joe PTA Physical Therapist Assistant                   Mobility       Row Name 23 09          Bed Mobility    Bed Mobility supine-sit  -     Supine-Sit [place] (Bed Mobility) modified independence  -     Assistive Device (Bed Mobility) bed rails;head of bed elevated  -       Row Name 23          Sit-Stand Transfer    Sit-Stand [place] (Transfers) standby assist  -     Assistive Device (Sit-Stand Transfers) walker, front-wheeled;bariatric  -       Row Name 23 0942          Gait/Stairs  (Locomotion)    [place] (Gait) standby assist  -SM     Assistive Device (Gait) walker, front-wheeled;bariatric equipment  -     Distance in Feet (Gait) 60  -SM     Deviations/Abnormal Patterns (Gait) kg decreased;stride length decreased  -SM     Bilateral Gait Deviations forward flexed posture  -SM     Comment, (Gait/Stairs) limited d/t fatigue and SOA  -SM               User Key  (r) = Recorded By, (t) = Taken By, (c) = Cosigned By      Initials Name Provider Type    Chante Roman PTA Physical Therapist Assistant                   Obj/Interventions    No documentation.                  Goals/Plan    No documentation.                  Clinical Impression       Row Name 09/08/23 0945          Pain    Pretreatment Pain Rating 0/10 - no pain  -SM     Posttreatment Pain Rating 0/10 - no pain  -SM       Row Name 09/08/23 0945          Positioning and Restraints    Pre-Treatment Position in bed  -SM     Post Treatment Position bed  -SM     In Bed sitting EOB;call light within reach;encouraged to call for assist  -SM               User Key  (r) = Recorded By, (t) = Taken By, (c) = Cosigned By      Initials Name Provider Type    Chante Roman PTA Physical Therapist Assistant                   Outcome Measures       Row Name 09/08/23 0945          How much help from another person do you currently need...    Turning from your back to your side while in flat bed without using bedrails? 4  -SM     Moving from lying on back to sitting on the side of a flat bed without bedrails? 3  -SM     Moving to and from a bed to a chair (including a wheelchair)? 4  -SM     Standing up from a chair using your arms (e.g., wheelchair, bedside chair)? 4  -SM     Climbing 3-5 steps with a railing? 3  -SM     To walk in hospital room? 3  -SM     AM-PAC 6 Clicks Score (PT) 21  -SM     Highest level of mobility 6 --> Walked 10 steps or more  -       Row Name 09/08/23 0945          Functional Assessment     Outcome Measure Options AM-PAC 6 Clicks Basic Mobility (PT)  -               User Key  (r) = Recorded By, (t) = Taken By, (c) = Cosigned By      Initials Name Provider Type     Chatne Joe PTA Physical Therapist Assistant                                 Physical Therapy Education       Title: PT OT SLP Therapies (Done)       Topic: Physical Therapy (Done)       Point: Mobility training (Done)       Learning Progress Summary             Patient Acceptance, E,TB,D, VU,NR by  at 9/8/2023 0945    Acceptance, E, NR by AR at 9/7/2023 1334                         Point: Home exercise program (Done)       Learning Progress Summary             Patient Acceptance, E,TB,D, VU,NR by  at 9/8/2023 0945    Acceptance, E, NR by AR at 9/7/2023 1334                         Point: Body mechanics (Done)       Learning Progress Summary             Patient Acceptance, E,TB,D, VU,NR by  at 9/8/2023 0945    Acceptance, E, NR by AR at 9/7/2023 1334                         Point: Precautions (Done)       Learning Progress Summary             Patient Acceptance, E,TB,D, VU,NR by  at 9/8/2023 0945    Acceptance, E, NR by AR at 9/7/2023 1334                                         User Key       Initials Effective Dates Name Provider Type Discipline    AR 06/16/21 -  Allyson Caceres PT Physical Therapist PT     03/07/18 -  Chante Joe PTA Physical Therapist Assistant PT                  PT Recommendation and Plan     Plan of Care Reviewed With: patient  Progress: improving  Outcome Evaluation: Pt tolerated treatment well this date. Increased gait distance to 60ft w/ Rw and SBA for safety. Limited d/t fatigue and SOA, though O2 sats at 94-95% when returning to bed. Pt was on 3L O2 throughout. Encouraged pt to ambulate more frequently during the day.     Time Calculation:         PT Charges       Row Name 09/08/23 0948 09/08/23 0702          Time Calculation    Start Time 0925 - --     Stop Time 0935 - --      Time Calculation (min) 10 min  - --     PT Received On 09/08/23  -JENNIFER --     PT - Next Appointment 09/11/23  - 09/09/23  -VIANEY               User Key  (r) = Recorded By, (t) = Taken By, (c) = Cosigned By      Initials Name Provider Type    Romana Hernandez, PT Physical Therapist    Chante Roman, PATRICIA Physical Therapist Assistant                  Therapy Charges for Today       Code Description Service Date Service Provider Modifiers Qty    58886041016 HC PT THER PROC EA 15 MIN 9/8/2023 Chante Joe PTA GP 1            PT G-Codes  Outcome Measure Options: AM-PAC 6 Clicks Basic Mobility (PT)  AM-PAC 6 Clicks Score (PT): 21  PT Discharge Summary  Anticipated Discharge Disposition (PT): home with assist    Chante Joe PTA  9/8/2023
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
## 2023-09-08 NOTE — PLAN OF CARE
Goal Outcome Evaluation:  Plan of Care Reviewed With: patient        Progress: improving  Outcome Evaluation: Pt tolerated treatment well this date. Increased gait distance to 60ft w/ Rw and SBA for safety. Limited d/t fatigue and SOA, though O2 sats at 94-95% when returning to bed. Pt was on 3L O2 throughout. Encouraged pt to ambulate more frequently during the day.      Anticipated Discharge Disposition (PT): home with assist
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
## 2023-09-09 LAB
ANION GAP SERPL CALCULATED.3IONS-SCNC: 8.9 MMOL/L (ref 5–15)
BUN SERPL-MCNC: 15 MG/DL (ref 8–23)
BUN/CREAT SERPL: 16.7 (ref 7–25)
CALCIUM SPEC-SCNC: 8.5 MG/DL (ref 8.6–10.5)
CHLORIDE SERPL-SCNC: 95 MMOL/L (ref 98–107)
CO2 SERPL-SCNC: 32.1 MMOL/L (ref 22–29)
CREAT SERPL-MCNC: 0.9 MG/DL (ref 0.76–1.27)
EGFRCR SERPLBLD CKD-EPI 2021: 88 ML/MIN/1.73
GLUCOSE BLDC GLUCOMTR-MCNC: 144 MG/DL (ref 70–130)
GLUCOSE BLDC GLUCOMTR-MCNC: 165 MG/DL (ref 70–130)
GLUCOSE BLDC GLUCOMTR-MCNC: 189 MG/DL (ref 70–130)
GLUCOSE BLDC GLUCOMTR-MCNC: 216 MG/DL (ref 70–130)
GLUCOSE SERPL-MCNC: 179 MG/DL (ref 65–99)
POTASSIUM SERPL-SCNC: 3.8 MMOL/L (ref 3.5–5.2)
SODIUM SERPL-SCNC: 136 MMOL/L (ref 136–145)

## 2023-09-09 PROCEDURE — 63710000001 INSULIN GLARGINE PER 5 UNITS: Performed by: STUDENT IN AN ORGANIZED HEALTH CARE EDUCATION/TRAINING PROGRAM

## 2023-09-09 PROCEDURE — 63710000001 INSULIN LISPRO (HUMAN) PER 5 UNITS: Performed by: NURSE PRACTITIONER

## 2023-09-09 PROCEDURE — 63710000001 DIPHENHYDRAMINE PER 50 MG: Performed by: INTERNAL MEDICINE

## 2023-09-09 PROCEDURE — 82948 REAGENT STRIP/BLOOD GLUCOSE: CPT

## 2023-09-09 PROCEDURE — 80048 BASIC METABOLIC PNL TOTAL CA: CPT | Performed by: INTERNAL MEDICINE

## 2023-09-09 PROCEDURE — 25010000002 NA FERRIC GLUC CPLX PER 12.5 MG: Performed by: INTERNAL MEDICINE

## 2023-09-09 PROCEDURE — 99232 SBSQ HOSP IP/OBS MODERATE 35: CPT | Performed by: INTERNAL MEDICINE

## 2023-09-09 RX ORDER — ACETAMINOPHEN 325 MG/1
650 TABLET ORAL EVERY 24 HOURS
Status: DISCONTINUED | OUTPATIENT
Start: 2023-09-09 | End: 2023-09-10

## 2023-09-09 RX ORDER — TORSEMIDE 100 MG/1
100 TABLET ORAL DAILY
Status: DISCONTINUED | OUTPATIENT
Start: 2023-09-09 | End: 2023-09-10

## 2023-09-09 RX ORDER — DIPHENHYDRAMINE HCL 25 MG
25 CAPSULE ORAL EVERY 24 HOURS
Status: DISCONTINUED | OUTPATIENT
Start: 2023-09-09 | End: 2023-09-10

## 2023-09-09 RX ADMIN — LEVOTHYROXINE SODIUM 25 MCG: 0.03 TABLET ORAL at 05:23

## 2023-09-09 RX ADMIN — ATORVASTATIN CALCIUM 40 MG: 20 TABLET, FILM COATED ORAL at 19:43

## 2023-09-09 RX ADMIN — BUPROPION HYDROCHLORIDE 150 MG: 150 TABLET, EXTENDED RELEASE ORAL at 08:01

## 2023-09-09 RX ADMIN — INSULIN GLARGINE 15 UNITS: 100 INJECTION, SOLUTION SUBCUTANEOUS at 21:29

## 2023-09-09 RX ADMIN — Medication 500 MCG: at 08:01

## 2023-09-09 RX ADMIN — INSULIN LISPRO 3 UNITS: 100 INJECTION, SOLUTION INTRAVENOUS; SUBCUTANEOUS at 16:55

## 2023-09-09 RX ADMIN — BUMETANIDE 2 MG: 0.25 INJECTION INTRAMUSCULAR; INTRAVENOUS at 07:55

## 2023-09-09 RX ADMIN — HYDROCODONE BITARTRATE AND ACETAMINOPHEN 1 TABLET: 10; 325 TABLET ORAL at 08:05

## 2023-09-09 RX ADMIN — Medication 10 ML: at 19:43

## 2023-09-09 RX ADMIN — INSULIN LISPRO 2 UNITS: 100 INJECTION, SOLUTION INTRAVENOUS; SUBCUTANEOUS at 12:19

## 2023-09-09 RX ADMIN — PANTOPRAZOLE SODIUM 40 MG: 40 TABLET, DELAYED RELEASE ORAL at 08:01

## 2023-09-09 RX ADMIN — SODIUM CHLORIDE 250 MG: 9 INJECTION, SOLUTION INTRAVENOUS at 14:00

## 2023-09-09 RX ADMIN — DIPHENHYDRAMINE HYDROCHLORIDE 25 MG: 25 CAPSULE ORAL at 13:35

## 2023-09-09 RX ADMIN — ACETAMINOPHEN 650 MG: 325 TABLET, FILM COATED ORAL at 13:35

## 2023-09-09 RX ADMIN — TORSEMIDE 100 MG: 100 TABLET ORAL at 15:24

## 2023-09-09 RX ADMIN — INSULIN LISPRO 2 UNITS: 100 INJECTION, SOLUTION INTRAVENOUS; SUBCUTANEOUS at 21:29

## 2023-09-09 RX ADMIN — INSULIN GLARGINE 15 UNITS: 100 INJECTION, SOLUTION SUBCUTANEOUS at 08:00

## 2023-09-09 RX ADMIN — Medication 10 ML: at 08:01

## 2023-09-09 RX ADMIN — SENNOSIDES AND DOCUSATE SODIUM 2 TABLET: 50; 8.6 TABLET ORAL at 19:43

## 2023-09-09 RX ADMIN — EMPAGLIFLOZIN 10 MG: 10 TABLET, FILM COATED ORAL at 08:01

## 2023-09-09 RX ADMIN — HYDROCODONE BITARTRATE AND ACETAMINOPHEN 1 TABLET: 10; 325 TABLET ORAL at 15:24

## 2023-09-09 RX ADMIN — SPIRONOLACTONE 25 MG: 25 TABLET ORAL at 08:01

## 2023-09-09 NOTE — PLAN OF CARE
Goal Outcome Evaluation:  Plan of Care Reviewed With: patient        Progress: improving  Outcome Evaluation: IV Bumex stopped and new order for Torsemide administered.  Urinal within reach.  I & O documented.  Echo ordered.  Dose #1 ferric gluconate administered per order.  Patient up in chair this shift.  Walker used when ambulating.  VS and labs monitored.
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
## 2023-09-09 NOTE — PLAN OF CARE
Goal Outcome Evaluation:  Plan of Care Reviewed With: patient        Progress: improving  Outcome Evaluation: Good urine output. IV bumex given. O2 remains at 2-3L. Ambulating well.
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
## 2023-09-10 ENCOUNTER — APPOINTMENT (OUTPATIENT)
Dept: CARDIOLOGY | Facility: HOSPITAL | Age: 78
DRG: 291 | End: 2023-09-10
Payer: MEDICARE

## 2023-09-10 LAB
ANION GAP SERPL CALCULATED.3IONS-SCNC: 10.8 MMOL/L (ref 5–15)
BH CV ECHO MEAS - AO MAX PG: 9.9 MMHG
BH CV ECHO MEAS - AO MEAN PG: 5.1 MMHG
BH CV ECHO MEAS - AO V2 MAX: 157.5 CM/SEC
BH CV ECHO MEAS - AO V2 VTI: 30.4 CM
BH CV ECHO MEAS - LV MAX PG: 3.5 MMHG
BH CV ECHO MEAS - LV MEAN PG: 1.97 MMHG
BH CV ECHO MEAS - LV V1 MAX: 93.5 CM/SEC
BH CV ECHO MEAS - LV V1 VTI: 24.8 CM
BH CV ECHO MEAS - MV A MAX VEL: 151.8 CM/SEC
BH CV ECHO MEAS - MV DEC SLOPE: 524.4 CM/SEC2
BH CV ECHO MEAS - MV DEC TIME: 0.26 MSEC
BH CV ECHO MEAS - MV E MAX VEL: 134 CM/SEC
BH CV ECHO MEAS - MV E/A: 0.88
BH CV ECHO MEAS - MV MAX PG: 9.7 MMHG
BH CV ECHO MEAS - MV MEAN PG: 4.8 MMHG
BH CV ECHO MEAS - MV P1/2T: 76.6 MSEC
BH CV ECHO MEAS - MV V2 VTI: 42.4 CM
BH CV ECHO MEAS - MVA(P1/2T): 2.9 CM2
BH CV ECHO MEAS - PA ACC TIME: 0.18 SEC
BH CV ECHO MEAS - PA V2 MAX: 118.1 CM/SEC
BH CV ECHO MEAS - RV MAX PG: 3.1 MMHG
BH CV ECHO MEAS - RV V1 MAX: 87.4 CM/SEC
BH CV ECHO MEAS - RV V1 VTI: 17.3 CM
BUN SERPL-MCNC: 16 MG/DL (ref 8–23)
BUN/CREAT SERPL: 15.1 (ref 7–25)
CALCIUM SPEC-SCNC: 8.8 MG/DL (ref 8.6–10.5)
CHLORIDE SERPL-SCNC: 93 MMOL/L (ref 98–107)
CO2 SERPL-SCNC: 33.2 MMOL/L (ref 22–29)
CREAT SERPL-MCNC: 1.06 MG/DL (ref 0.76–1.27)
DEPRECATED RDW RBC AUTO: 42 FL (ref 37–54)
EGFRCR SERPLBLD CKD-EPI 2021: 72.3 ML/MIN/1.73
ERYTHROCYTE [DISTWIDTH] IN BLOOD BY AUTOMATED COUNT: 14.6 % (ref 12.3–15.4)
GLUCOSE BLDC GLUCOMTR-MCNC: 151 MG/DL (ref 70–130)
GLUCOSE BLDC GLUCOMTR-MCNC: 158 MG/DL (ref 70–130)
GLUCOSE BLDC GLUCOMTR-MCNC: 159 MG/DL (ref 70–130)
GLUCOSE SERPL-MCNC: 155 MG/DL (ref 65–99)
HCT VFR BLD AUTO: 32.6 % (ref 37.5–51)
HGB BLD-MCNC: 9.6 G/DL (ref 13–17.7)
MAGNESIUM SERPL-MCNC: 2 MG/DL (ref 1.6–2.4)
MCH RBC QN AUTO: 23.4 PG (ref 26.6–33)
MCHC RBC AUTO-ENTMCNC: 29.4 G/DL (ref 31.5–35.7)
MCV RBC AUTO: 79.3 FL (ref 79–97)
PLATELET # BLD AUTO: 108 10*3/MM3 (ref 140–450)
PMV BLD AUTO: 10.3 FL (ref 6–12)
POTASSIUM SERPL-SCNC: 3.2 MMOL/L (ref 3.5–5.2)
POTASSIUM SERPL-SCNC: 4.2 MMOL/L (ref 3.5–5.2)
RBC # BLD AUTO: 4.11 10*6/MM3 (ref 4.14–5.8)
SODIUM SERPL-SCNC: 137 MMOL/L (ref 136–145)
URATE SERPL-MCNC: 8.5 MG/DL (ref 3.4–7)
WBC NRBC COR # BLD: 2.87 10*3/MM3 (ref 3.4–10.8)

## 2023-09-10 PROCEDURE — 63710000001 INSULIN LISPRO (HUMAN) PER 5 UNITS: Performed by: NURSE PRACTITIONER

## 2023-09-10 PROCEDURE — 80048 BASIC METABOLIC PNL TOTAL CA: CPT | Performed by: INTERNAL MEDICINE

## 2023-09-10 PROCEDURE — 93325 DOPPLER ECHO COLOR FLOW MAPG: CPT

## 2023-09-10 PROCEDURE — 94761 N-INVAS EAR/PLS OXIMETRY MLT: CPT

## 2023-09-10 PROCEDURE — 25010000002 NA FERRIC GLUC CPLX PER 12.5 MG: Performed by: INTERNAL MEDICINE

## 2023-09-10 PROCEDURE — 85027 COMPLETE CBC AUTOMATED: CPT | Performed by: INTERNAL MEDICINE

## 2023-09-10 PROCEDURE — 93321 DOPPLER ECHO F-UP/LMTD STD: CPT | Performed by: INTERNAL MEDICINE

## 2023-09-10 PROCEDURE — 99232 SBSQ HOSP IP/OBS MODERATE 35: CPT | Performed by: INTERNAL MEDICINE

## 2023-09-10 PROCEDURE — 84550 ASSAY OF BLOOD/URIC ACID: CPT | Performed by: INTERNAL MEDICINE

## 2023-09-10 PROCEDURE — 94799 UNLISTED PULMONARY SVC/PX: CPT

## 2023-09-10 PROCEDURE — 93308 TTE F-UP OR LMTD: CPT | Performed by: INTERNAL MEDICINE

## 2023-09-10 PROCEDURE — 63710000001 DIPHENHYDRAMINE PER 50 MG: Performed by: INTERNAL MEDICINE

## 2023-09-10 PROCEDURE — 82948 REAGENT STRIP/BLOOD GLUCOSE: CPT

## 2023-09-10 PROCEDURE — 84132 ASSAY OF SERUM POTASSIUM: CPT | Performed by: INTERNAL MEDICINE

## 2023-09-10 PROCEDURE — 63710000001 INSULIN GLARGINE PER 5 UNITS: Performed by: STUDENT IN AN ORGANIZED HEALTH CARE EDUCATION/TRAINING PROGRAM

## 2023-09-10 PROCEDURE — 93325 DOPPLER ECHO COLOR FLOW MAPG: CPT | Performed by: INTERNAL MEDICINE

## 2023-09-10 PROCEDURE — 93308 TTE F-UP OR LMTD: CPT

## 2023-09-10 PROCEDURE — 93321 DOPPLER ECHO F-UP/LMTD STD: CPT

## 2023-09-10 PROCEDURE — 83735 ASSAY OF MAGNESIUM: CPT | Performed by: INTERNAL MEDICINE

## 2023-09-10 RX ORDER — POTASSIUM CHLORIDE 750 MG/1
40 TABLET, FILM COATED, EXTENDED RELEASE ORAL DAILY
Status: DISCONTINUED | OUTPATIENT
Start: 2023-09-10 | End: 2023-09-11 | Stop reason: HOSPADM

## 2023-09-10 RX ORDER — TORSEMIDE 100 MG/1
100 TABLET ORAL 2 TIMES DAILY
Status: DISCONTINUED | OUTPATIENT
Start: 2023-09-10 | End: 2023-09-11 | Stop reason: HOSPADM

## 2023-09-10 RX ORDER — POTASSIUM CHLORIDE 750 MG/1
40 TABLET, FILM COATED, EXTENDED RELEASE ORAL EVERY 4 HOURS
Status: COMPLETED | OUTPATIENT
Start: 2023-09-10 | End: 2023-09-10

## 2023-09-10 RX ORDER — FERROUS SULFATE 325(65) MG
325 TABLET ORAL
Status: DISCONTINUED | OUTPATIENT
Start: 2023-09-11 | End: 2023-09-11 | Stop reason: HOSPADM

## 2023-09-10 RX ADMIN — SENNOSIDES AND DOCUSATE SODIUM 2 TABLET: 50; 8.6 TABLET ORAL at 20:53

## 2023-09-10 RX ADMIN — Medication 500 MCG: at 08:17

## 2023-09-10 RX ADMIN — EMPAGLIFLOZIN 10 MG: 10 TABLET, FILM COATED ORAL at 08:17

## 2023-09-10 RX ADMIN — POTASSIUM CHLORIDE 40 MEQ: 750 TABLET, EXTENDED RELEASE ORAL at 14:17

## 2023-09-10 RX ADMIN — HYDROCODONE BITARTRATE AND ACETAMINOPHEN 1 TABLET: 10; 325 TABLET ORAL at 00:02

## 2023-09-10 RX ADMIN — INSULIN LISPRO 2 UNITS: 100 INJECTION, SOLUTION INTRAVENOUS; SUBCUTANEOUS at 17:18

## 2023-09-10 RX ADMIN — SPIRONOLACTONE 25 MG: 25 TABLET ORAL at 08:17

## 2023-09-10 RX ADMIN — HYDROCODONE BITARTRATE AND ACETAMINOPHEN 1 TABLET: 10; 325 TABLET ORAL at 14:19

## 2023-09-10 RX ADMIN — POTASSIUM CHLORIDE 40 MEQ: 750 TABLET, EXTENDED RELEASE ORAL at 09:28

## 2023-09-10 RX ADMIN — POTASSIUM CHLORIDE 40 MEQ: 750 TABLET, EXTENDED RELEASE ORAL at 12:16

## 2023-09-10 RX ADMIN — INSULIN LISPRO 2 UNITS: 100 INJECTION, SOLUTION INTRAVENOUS; SUBCUTANEOUS at 12:16

## 2023-09-10 RX ADMIN — INSULIN GLARGINE 15 UNITS: 100 INJECTION, SOLUTION SUBCUTANEOUS at 21:49

## 2023-09-10 RX ADMIN — TORSEMIDE 100 MG: 100 TABLET ORAL at 21:49

## 2023-09-10 RX ADMIN — INSULIN GLARGINE 15 UNITS: 100 INJECTION, SOLUTION SUBCUTANEOUS at 08:17

## 2023-09-10 RX ADMIN — INSULIN LISPRO 2 UNITS: 100 INJECTION, SOLUTION INTRAVENOUS; SUBCUTANEOUS at 21:49

## 2023-09-10 RX ADMIN — DIPHENHYDRAMINE HYDROCHLORIDE 25 MG: 25 CAPSULE ORAL at 12:45

## 2023-09-10 RX ADMIN — TORSEMIDE 100 MG: 100 TABLET ORAL at 08:17

## 2023-09-10 RX ADMIN — ACETAMINOPHEN 650 MG: 325 TABLET, FILM COATED ORAL at 12:45

## 2023-09-10 RX ADMIN — HYDROCODONE BITARTRATE AND ACETAMINOPHEN 1 TABLET: 10; 325 TABLET ORAL at 06:00

## 2023-09-10 RX ADMIN — ATORVASTATIN CALCIUM 40 MG: 20 TABLET, FILM COATED ORAL at 20:53

## 2023-09-10 RX ADMIN — HYDROCODONE BITARTRATE AND ACETAMINOPHEN 1 TABLET: 10; 325 TABLET ORAL at 20:53

## 2023-09-10 RX ADMIN — INSULIN LISPRO 2 UNITS: 100 INJECTION, SOLUTION INTRAVENOUS; SUBCUTANEOUS at 08:17

## 2023-09-10 RX ADMIN — Medication 10 ML: at 08:18

## 2023-09-10 RX ADMIN — BUPROPION HYDROCHLORIDE 150 MG: 150 TABLET, EXTENDED RELEASE ORAL at 08:17

## 2023-09-10 RX ADMIN — LEVOTHYROXINE SODIUM 25 MCG: 0.03 TABLET ORAL at 05:59

## 2023-09-10 RX ADMIN — SODIUM CHLORIDE 250 MG: 9 INJECTION, SOLUTION INTRAVENOUS at 13:19

## 2023-09-10 RX ADMIN — PANTOPRAZOLE SODIUM 40 MG: 40 TABLET, DELAYED RELEASE ORAL at 08:18
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
## 2023-09-10 NOTE — PLAN OF CARE
Goal Outcome Evaluation:  Plan of Care Reviewed With: patient        Progress: no change  Outcome Evaluation: Sitting at side of bed, sleeping short intervals this shift, using urinal by bsd, voiding large amounts of urine, pt being duiressed, bp meds nonitored, sr on tele, able to voiced needs, vss.Per pt, ble edema has improved.
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
## 2023-09-10 NOTE — PLAN OF CARE
Goal Outcome Evaluation:  Plan of Care Reviewed With: patient        Progress: improving  Outcome Evaluation: Echo completed this shift.  Dose #2 ferric gluconate administered and patient complaints of headache.  MD notified and patient to start PO iron.  Patient on 1 L O2 NC.  Patient complaints of nasal discomfort and humidification added.  BS monitored and insulin administered per sliding scale.  Torsemide continues and output recorded.  VS  and labs monitored.

## 2023-09-11 ENCOUNTER — HOME HEALTH ADMISSION (OUTPATIENT)
Dept: HOME HEALTH SERVICES | Facility: HOME HEALTHCARE | Age: 78
End: 2023-09-11
Payer: MEDICARE

## 2023-09-11 ENCOUNTER — READMISSION MANAGEMENT (OUTPATIENT)
Dept: CALL CENTER | Facility: HOSPITAL | Age: 78
End: 2023-09-11
Payer: MEDICARE

## 2023-09-11 VITALS
WEIGHT: 315 LBS | OXYGEN SATURATION: 94 % | SYSTOLIC BLOOD PRESSURE: 128 MMHG | HEIGHT: 74 IN | RESPIRATION RATE: 18 BRPM | TEMPERATURE: 97.5 F | BODY MASS INDEX: 40.43 KG/M2 | HEART RATE: 93 BPM | DIASTOLIC BLOOD PRESSURE: 69 MMHG

## 2023-09-11 PROBLEM — I50.33 ACUTE ON CHRONIC HEART FAILURE WITH PRESERVED EJECTION FRACTION (HFPEF): Status: ACTIVE | Noted: 2023-09-11

## 2023-09-11 PROBLEM — D61.818 PANCYTOPENIA: Status: RESOLVED | Noted: 2023-09-07 | Resolved: 2023-09-11

## 2023-09-11 LAB
ANION GAP SERPL CALCULATED.3IONS-SCNC: 11.1 MMOL/L (ref 5–15)
BUN SERPL-MCNC: 17 MG/DL (ref 8–23)
BUN/CREAT SERPL: 16.2 (ref 7–25)
CALCIUM SPEC-SCNC: 9.7 MG/DL (ref 8.6–10.5)
CHLORIDE SERPL-SCNC: 94 MMOL/L (ref 98–107)
CO2 SERPL-SCNC: 32.9 MMOL/L (ref 22–29)
CREAT SERPL-MCNC: 1.05 MG/DL (ref 0.76–1.27)
DEPRECATED RDW RBC AUTO: 43.7 FL (ref 37–54)
EGFRCR SERPLBLD CKD-EPI 2021: 73.1 ML/MIN/1.73
ERYTHROCYTE [DISTWIDTH] IN BLOOD BY AUTOMATED COUNT: 15 % (ref 12.3–15.4)
GLUCOSE BLDC GLUCOMTR-MCNC: 152 MG/DL (ref 70–130)
GLUCOSE BLDC GLUCOMTR-MCNC: 163 MG/DL (ref 70–130)
GLUCOSE SERPL-MCNC: 146 MG/DL (ref 65–99)
HCT VFR BLD AUTO: 35.8 % (ref 37.5–51)
HGB BLD-MCNC: 11 G/DL (ref 13–17.7)
MAGNESIUM SERPL-MCNC: 2 MG/DL (ref 1.6–2.4)
MCH RBC QN AUTO: 24.7 PG (ref 26.6–33)
MCHC RBC AUTO-ENTMCNC: 30.7 G/DL (ref 31.5–35.7)
MCV RBC AUTO: 80.3 FL (ref 79–97)
PLATELET # BLD AUTO: 141 10*3/MM3 (ref 140–450)
PMV BLD AUTO: 10.2 FL (ref 6–12)
POTASSIUM SERPL-SCNC: 3.7 MMOL/L (ref 3.5–5.2)
RBC # BLD AUTO: 4.46 10*6/MM3 (ref 4.14–5.8)
SODIUM SERPL-SCNC: 138 MMOL/L (ref 136–145)
URATE SERPL-MCNC: 8.8 MG/DL (ref 3.4–7)
WBC NRBC COR # BLD: 3.82 10*3/MM3 (ref 3.4–10.8)

## 2023-09-11 PROCEDURE — 99232 SBSQ HOSP IP/OBS MODERATE 35: CPT | Performed by: INTERNAL MEDICINE

## 2023-09-11 PROCEDURE — 94618 PULMONARY STRESS TESTING: CPT

## 2023-09-11 PROCEDURE — 85027 COMPLETE CBC AUTOMATED: CPT | Performed by: INTERNAL MEDICINE

## 2023-09-11 PROCEDURE — 63710000001 INSULIN GLARGINE PER 5 UNITS: Performed by: STUDENT IN AN ORGANIZED HEALTH CARE EDUCATION/TRAINING PROGRAM

## 2023-09-11 PROCEDURE — 80048 BASIC METABOLIC PNL TOTAL CA: CPT | Performed by: INTERNAL MEDICINE

## 2023-09-11 PROCEDURE — 84550 ASSAY OF BLOOD/URIC ACID: CPT | Performed by: INTERNAL MEDICINE

## 2023-09-11 PROCEDURE — 82948 REAGENT STRIP/BLOOD GLUCOSE: CPT

## 2023-09-11 PROCEDURE — 83735 ASSAY OF MAGNESIUM: CPT | Performed by: INTERNAL MEDICINE

## 2023-09-11 PROCEDURE — 63710000001 INSULIN LISPRO (HUMAN) PER 5 UNITS: Performed by: NURSE PRACTITIONER

## 2023-09-11 RX ORDER — SPIRONOLACTONE 25 MG/1
25 TABLET ORAL DAILY
Qty: 30 TABLET | Refills: 0 | Status: SHIPPED | OUTPATIENT
Start: 2023-09-12

## 2023-09-11 RX ORDER — TORSEMIDE 100 MG/1
100 TABLET ORAL 2 TIMES DAILY
Qty: 60 TABLET | Refills: 0 | Status: SHIPPED | OUTPATIENT
Start: 2023-09-11

## 2023-09-11 RX ORDER — METOPROLOL SUCCINATE 25 MG/1
25 TABLET, EXTENDED RELEASE ORAL
Status: DISCONTINUED | OUTPATIENT
Start: 2023-09-11 | End: 2023-09-11 | Stop reason: HOSPADM

## 2023-09-11 RX ORDER — FERROUS SULFATE 325(65) MG
325 TABLET ORAL
Qty: 30 TABLET | Refills: 0 | Status: SHIPPED | OUTPATIENT
Start: 2023-09-12

## 2023-09-11 RX ORDER — POTASSIUM CHLORIDE 20 MEQ/1
40 TABLET, EXTENDED RELEASE ORAL DAILY
Qty: 60 TABLET | Refills: 0 | Status: SHIPPED | OUTPATIENT
Start: 2023-09-12

## 2023-09-11 RX ADMIN — LEVOTHYROXINE SODIUM 25 MCG: 0.03 TABLET ORAL at 05:46

## 2023-09-11 RX ADMIN — EMPAGLIFLOZIN 10 MG: 10 TABLET, FILM COATED ORAL at 08:40

## 2023-09-11 RX ADMIN — INSULIN LISPRO 2 UNITS: 100 INJECTION, SOLUTION INTRAVENOUS; SUBCUTANEOUS at 08:40

## 2023-09-11 RX ADMIN — INSULIN GLARGINE 15 UNITS: 100 INJECTION, SOLUTION SUBCUTANEOUS at 08:40

## 2023-09-11 RX ADMIN — SPIRONOLACTONE 25 MG: 25 TABLET ORAL at 08:40

## 2023-09-11 RX ADMIN — HYDROCODONE BITARTRATE AND ACETAMINOPHEN 1 TABLET: 10; 325 TABLET ORAL at 08:44

## 2023-09-11 RX ADMIN — Medication 500 MCG: at 08:40

## 2023-09-11 RX ADMIN — TORSEMIDE 100 MG: 100 TABLET ORAL at 08:40

## 2023-09-11 RX ADMIN — FERROUS SULFATE TAB 325 MG (65 MG ELEMENTAL FE) 325 MG: 325 (65 FE) TAB at 08:40

## 2023-09-11 RX ADMIN — BUPROPION HYDROCHLORIDE 150 MG: 150 TABLET, EXTENDED RELEASE ORAL at 08:40

## 2023-09-11 RX ADMIN — POTASSIUM CHLORIDE 40 MEQ: 750 TABLET, EXTENDED RELEASE ORAL at 08:40

## 2023-09-11 RX ADMIN — INSULIN LISPRO 2 UNITS: 100 INJECTION, SOLUTION INTRAVENOUS; SUBCUTANEOUS at 12:12

## 2023-09-11 RX ADMIN — PANTOPRAZOLE SODIUM 40 MG: 40 TABLET, DELAYED RELEASE ORAL at 08:40

## 2023-09-11 RX ADMIN — Medication 10 ML: at 08:41

## 2023-09-11 RX ADMIN — METOPROLOL SUCCINATE 25 MG: 25 TABLET, EXTENDED RELEASE ORAL at 14:30
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
## 2023-09-11 NOTE — CASE MANAGEMENT/SOCIAL WORK
Continued Stay Note  [facility]     Patient Name: Brandon Alan  MRN: [id number]  Today's Date: 9/11/2023    Admit Date: 9/5/2023    Plan: Return home with daughter and [facility]  following.   Discharge Plan       Row Name 09/11/23 1257       Plan    Plan Return home with daughter and [facility]  following.    Patient/Family in Agreement with Plan yes    Plan Comments Spoke with patient at bedside.  He confirms plan to return home with his daughter. He is agreeable to HH following - states for CCP to speak with edgar Bautista.  Spoke with Lily by telephone.  She is agreeable to referral to any accepting HH agency.  Spoke with [facility] and they do not accept insurance.  Referral to [facility]  and they can accept.  Daughter requests teaching on HF - states patient keeps returning to hospital and they do not know what to do to help him, and also requests teaching on diabetes and diet - [facility] is aware.  Mellissa SHAFFER                 Expected Discharge Date and Time       Expected Discharge Date Expected Discharge Time    Sep 11, 2023               Becky S. [place], RN
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
## 2023-09-11 NOTE — PROGRESS NOTES
Enter Query Response Below      Query Response: Elevated cardiac enzymes secondary to demand ischemia associated with heart failure             If applicable, please update the problem list.     Patient: Brandon Alan        : 1945  Account: [id number]           Admit Date:         How to Respond to this query:       a. Click New Note     b. Answer query within the yellow box.                c. Update the Problem List, if applicable.      If you have any questions about this query contact me at: [phone]    :     [id number] presented with shortness of breath and found to have acute on chronic diastolic congestive heart failure.  EKG findings from 23 includes nonspecific IVCD with LAD, and nonspecific T abnormalities, lateral leads.  HS Troponin T findings during his hospital stay include 37, and 40.  Treated during his stay with IV Bumex, IV Lasix, oral Lasix, and oral torsemide.      After study, can the condition being treated be further specified as:    Demand ischemia due to (Diagnosis)  Elevated troponin only  Other- specify___________  Unable to determine     By submitting this query, we are merely seeking further clarification of documentation to accurately reflect all conditions that you are monitoring, evaluating, treating or that extend the hospitalization or utilize additional resources of care. Please utilize your independent clinical judgment when addressing the question(s) above.     This query and your response, once completed, will be entered into the legal medical record.    Sincerely,  Polina Vizcarra RN,BSN  [email or web address]  Clinical Documentation Integrity Program

## 2023-09-11 NOTE — DISCHARGE PLACEMENT REQUEST
"Heidy Cee (78 y.o. Male)       Date of Birth   1945    Social Security Number       Address   [address] [place], [place] [address]    Home Phone   [phone]    MRN   [id number]       Baptism   None    Marital Status   Single                            Admission Date   9/5/23    Admission Type   Emergency    Admitting Provider   Naresh Martinez MD    Attending Provider   Chente Manning MD    Department, Room/Bed   [address], [facility]/1       Discharge Date       Discharge Disposition   Home or Self Care    Discharge Destination                                 Attending Provider: Chente Manning MD    Allergies: Ferrlecit [Na Ferric Gluc Cplx In Sucrose], Penicillins    Isolation: None   Infection: None   Code Status: CPR    Ht: 188 cm (74\")   Wt: 149 kg (329 lb 1.6 oz)    Admission Cmt: None   Principal Problem: Acute on chronic diastolic (congestive) heart failure [I50.33]                   Active Insurance as of 9/5/2023       Primary Coverage       Payor Plan Insurance Group Employer/Plan Group    [facility] MEDICARE REPLACEMENT [facility] DUAL COMPLETE MEDICARE REPLACEMENT [id number]       Payor Plan Address Payor Plan Phone Number Payor Plan Fax Number Effective Dates    [address] [phone]  1/1/2023 - None Entered    [facility] [address]         Subscriber Name Subscriber Birth Date Member ID       HEIDY CEE [id number]                     Emergency Contacts        (Rel.) Home Phone Work Phone Mobile Phone    SatterEmily hernandez (Relative) [phone] -- [phone]    Veronica Cee (Daughter) [phone] -- [phone]    Lily Cee (Daughter) [phone] -- [phone]              "

## 2023-09-11 NOTE — PROGRESS NOTES
Exercise Oximetry    Patient Name:Brandon Alan   MRN: [id number]   Date: 09/11/23             ROOM AIR BASELINE   SpO2%  94   Heart Rate 108   Blood Pressure      EXERCISE ON ROOM AIR SpO2% EXERCISE ON O2 @ 3LPM SpO2%   1 MINUTE 89 1 MINUTE    2 MINUTES 86 2 MINUTES    3 MINUTES  3 MINUTES      90   4 MINUTES  4 MINUTES      92   5 MINUTES  5 MINUTES      93   6 MINUTES  6 MINUTES      92              Distance Walked   Distance Walked   Dyspnea (Amanda Scale)   Dyspnea (Amanda Scale)   Fatigue (Amanda Scale)   Fatigue (Amanda Scale)   SpO2% Post Exercise   SpO2% Post Exercise               92   HR Post Exercise   HR Post Exercise                    119   Time to Recovery   Time to Recovery            5 minutes     Comments: Patient became SOA with increasing HR.  Patient required 3lpm during walk to maintain SpO2 92%

## 2023-09-12 NOTE — OUTREACH NOTE
Prep Survey      Flowsheet Row Responses   [facility] facility patient discharged from? [place]   Is LACE score < 7 ? No   Eligibility Readm Mgmt   Discharge diagnosis Acute on chronic diastolic (congestive) heart failure   Does the patient have one of the following disease processes/diagnoses(primary or secondary)? CHF   Does the patient have Home health ordered? Yes   What is the Home health agency?  [facility]   Is there a DME ordered? No  [has needed DME, nocturnal O2 from [facility] established]   Prep survey completed? Yes            Rosalind DEL RIO - Registered Nurse

## 2023-09-12 NOTE — CASE MANAGEMENT/SOCIAL WORK
Case Management Discharge Note      Final Note: DC'd home with daughter and [facility] following 9/11    Provided Post Acute Provider List?: Refused  Refused Provider List Comment: Pt declines need for list.         Durable Medical Equipment Coordination complete.      Service Provider Selected Services Address Phone Fax Patient Preferred    [facility] [address], [facility] [address] [phone] --                         Home Medical Care Coordination complete.      Service Provider Selected Services Address Phone Fax Patient Preferred    [facility] [address], [address], [facility] [address] [phone] --              Therapy    No services have been selected for the patient.                        Transportation Services  Private: Car    Final Discharge Disposition Code: 06 - home with home health care

## 2023-09-14 ENCOUNTER — READMISSION MANAGEMENT (OUTPATIENT)
Dept: CALL CENTER | Facility: HOSPITAL | Age: 78
End: 2023-09-14
Payer: MEDICARE

## 2023-09-14 NOTE — OUTREACH NOTE
CHF Week 1 Survey      Flowsheet Row Responses   [facility] patient discharged from? [place]   Does the patient have one of the following disease processes/diagnoses(primary or secondary)? CHF   CHF Week 1 attempt successful? Yes   Call start time 0900   Call end time 0902   Discharge diagnosis Acute on chronic diastolic (congestive) heart failure   Meds reviewed with patient/caregiver? Yes   Is the patient having any side effects they believe may be caused by any medication additions or changes? No   Does the patient have all medications ordered at discharge? Yes   Is the patient taking all medications as directed (includes completed medication regime)? Yes   Does the patient have a primary care provider?  Yes   Has the patient kept scheduled appointments due by today? N/A   What is the Home health agency?  [facility]    Home health comments  visited 9/13/23   DME comments Wears oxygen prn - encouraged pt to purchase a pulse ox   Pulse Ox monitoring None   Psychosocial issues? No   Did the patient receive a copy of their discharge instructions? Yes   Nursing interventions Reviewed instructions with patient   What is the patient's perception of their health status since discharge? Improving   Nursing interventions Nurse provided patient education   Is the patient able to teach back signs and symptoms of worsening condition? (i.e. weight gain, shortness of air, etc.) Yes   If the patient is a current smoker, are they able to teach back resources for cessation? Not a smoker   Is the patient/caregiver able to teach back the hierarchy of who to call/visit for symptoms/problems? PCP, Specialist, Home health nurse, Urgent Care, ED, 911 Yes   Is the patient able to teach back Heart Failure Zones? Yes   CHF Nursing Interventions Education provided on various zones   CHF Zone this Call Green Zone   Green Zone Patient reports doing well, No new or worsening shortness of breath, Physical activity level is normal  for you, No new swelling -  feet, ankles and legs look normal for you, Weight check stable, No chest pain   Green Zone Interventions Daily weight check, Low sodium diet, Follow up visits planned, Meds as directed    CHF Week 1 call completed? Yes   Revoked No further contact(revokes)-requires comment   Graduated/Revoked comments Pt improving-no concerns during call-politely declined another FU call   Call end time 0902            Camila H - Registered Nurse